# Patient Record
Sex: MALE | Race: WHITE | NOT HISPANIC OR LATINO | Employment: OTHER | URBAN - METROPOLITAN AREA
[De-identification: names, ages, dates, MRNs, and addresses within clinical notes are randomized per-mention and may not be internally consistent; named-entity substitution may affect disease eponyms.]

---

## 2017-01-10 ENCOUNTER — OFFICE VISIT (OUTPATIENT)
Dept: FAMILY MEDICINE CLINIC | Facility: CLINIC | Age: 82
End: 2017-01-10

## 2017-01-10 VITALS
WEIGHT: 217 LBS | BODY MASS INDEX: 30.38 KG/M2 | TEMPERATURE: 97.8 F | SYSTOLIC BLOOD PRESSURE: 136 MMHG | DIASTOLIC BLOOD PRESSURE: 84 MMHG | OXYGEN SATURATION: 98 % | HEIGHT: 71 IN | HEART RATE: 62 BPM

## 2017-01-10 DIAGNOSIS — T14.8XXA BRUISING: ICD-10-CM

## 2017-01-10 DIAGNOSIS — I48.91 ATRIAL FIBRILLATION, UNSPECIFIED TYPE (HCC): Primary | ICD-10-CM

## 2017-01-10 DIAGNOSIS — S09.90XA HEAD INJURY DUE TO TRAUMA, INITIAL ENCOUNTER: ICD-10-CM

## 2017-01-10 DIAGNOSIS — Z91.81 HISTORY OF FALL: ICD-10-CM

## 2017-01-10 PROCEDURE — 99213 OFFICE O/P EST LOW 20 MIN: CPT | Performed by: GENERAL PRACTICE

## 2017-01-10 RX ORDER — AMLODIPINE BESYLATE 10 MG/1
10 TABLET ORAL
COMMUNITY
End: 2021-06-14 | Stop reason: SDUPTHER

## 2017-01-10 NOTE — PROGRESS NOTES
Subjective   Josefina Stephens is a 89 y.o. male.     Chief Complaint   Patient presents with   • Fall     Shoulder hip back pain    • Edema     Hand and leg        History of Present Illness patient is a 89 y.o. are nice  gentleman who now lives in [facility] he has recently come off of a cruise he went on this cruise even though he wasn't feeling that well.  He had slipped at his daughter's house in the kitchen he thought it was on something was slippery because the daughter said she forgot to clean up a mass he had a rather firm fall hitting his buttocks left arm and his head he states the went and sat down felt pretty well after that a few hours he did go on a trip on a cruise to care be an are restates she did not leave the ship for old but for 1.  He states she is had some ever since the fall                [place] ever since that fall        The following portions of the patient's history were reviewed and updated as appropriate: allergies, current medications, past family history, past medical history, past social history, past surgical history and problem list.      Review of Systems   Respiratory:        No marked shortness of breath   Cardiovascular:        To eating red patient sees Dr. Orosco past history of cardiac stent t has some 1+ swelling of the feet especially the left he has what appears to be some swelling of the hands and states he gained 20 pounds while eating on this cruise obviously I think this is probably some edema in addition   Neurological:        Also the fall and he hit his head I quizzed the patient quite a bit on she thinks he's been a little confused or any other symptoms since fall and he says yes I don't think am thinking like a normally of thought asking if he had visual change and he said no         Objective   Physical Exam   Cardiovascular:   The rate is slow On examination the patient appears to be a atrial fibrillation   Pulmonary/Chest:   His lungs are clear          Assessment/Plan   Josefina was seen today for fall and edema.    Diagnoses and all orders for this visit:    Atrial fibrillation, unspecified type    History of fall    Bruising    Head injury due to trauma, initial encounter            Rory Jha MD  1/10/2017    patient is a very nice 89-year-old long-time patient who now lives in [facility] but was in level and came in to see me today because of some of the issues she had this patient has a past history of cardiac stent he is on Plavix he had a fall around [place] he has bruises on his hips arm and a skull bruise that obviously was a pretty hard fall this patient on examination was found to be in atrial fibrillation slow rate 80 but he seemed somewhat confused to me and he admits to being more confused in his life ever since he had the fall I have set this patient to [place] emergency room because Dr. Orosco's at that hospital and I'm Rachaele talked Ana Luisa about this patient but there are also issues could this patient have a subdural because he did hit his head around [place] could he have pitched a quadrant have had a stroke area.  Patient is on his way with his family with the daughter driving to [facility]  The EKG did show the atrial fib that I felt I heard

## 2017-01-16 ENCOUNTER — OFFICE VISIT (OUTPATIENT)
Dept: FAMILY MEDICINE CLINIC | Facility: CLINIC | Age: 82
End: 2017-01-16

## 2017-01-16 VITALS
DIASTOLIC BLOOD PRESSURE: 82 MMHG | HEART RATE: 83 BPM | WEIGHT: 218 LBS | BODY MASS INDEX: 30.52 KG/M2 | SYSTOLIC BLOOD PRESSURE: 118 MMHG | OXYGEN SATURATION: 95 % | HEIGHT: 71 IN

## 2017-01-16 DIAGNOSIS — R60.0 PEDAL EDEMA: Primary | ICD-10-CM

## 2017-01-16 DIAGNOSIS — S80.12XD HEMATOMA OF LOWER EXTREMITY, LEFT, SUBSEQUENT ENCOUNTER: ICD-10-CM

## 2017-01-16 DIAGNOSIS — I48.19 PERSISTENT ATRIAL FIBRILLATION (HCC): ICD-10-CM

## 2017-01-16 DIAGNOSIS — Z79.01 ANTICOAGULATION ADEQUATE WITH ANTICOAGULANT THERAPY: ICD-10-CM

## 2017-01-16 PROCEDURE — 99213 OFFICE O/P EST LOW 20 MIN: CPT | Performed by: GENERAL PRACTICE

## 2017-01-16 RX ORDER — FUROSEMIDE 20 MG/1
20 TABLET ORAL EVERY MORNING
Qty: 60 TABLET | Refills: 2 | Status: SHIPPED | OUTPATIENT
Start: 2017-01-16 | End: 2017-08-01

## 2017-01-16 RX ORDER — RIVAROXABAN 20 MG/1
TABLET, FILM COATED ORAL
Refills: 0 | COMMUNITY
Start: 2017-01-11 | End: 2017-05-01

## 2017-01-16 NOTE — PROGRESS NOTES
Subjective   Josefina Stephens is a 89 y.o. male.     Chief Complaint   Patient presents with   • Leg Pain     left leg    • Leg Swelling     Left leg    • Heart Problem     Discuss results        History of Present Illness patient is a 89 y.o. very nice  gentleman who is here following a visit to the emergency room at [facility] worry was consulted by the house physician there and by Dr. Orosco.  This patient had been found to be in atrial fibrillation for is a first-time issue for this patient and I spoke to Dr. Orosco there is also an issue of a fall question of a subdural contusion to the left buttocks and left thigh swelling of the left lower leg he apparently was cleared for subdural cleared for PE there is a little confusion about a clot in the left femoral artery however he states they said I had no clots but later was told to see his family doctor for an issue with the femoral artery on the left leg.  Supposedly was see receive some information from Dr. Orosco which we have not found at this moment I do have the report from [facility]                        The following portions of the patient's history were reviewed and updated as appropriate: allergies, current medications, past family history, past medical history, past social history, past surgical history and problem list.      Review of Systems   Respiratory:        No PE was found on investigation   Cardiovascular:        Patient remains in atrial fibrillation rate of 83 note to the large weight loss or gain from 1213 to 1/16/16 pounds and I think this is mostly fluid of his edema of the left leg   Skin:        Diagnoses and scan in the left leg   Hematological:        Contusion abrasion obviously bleeding under the skin and muscle of a left thigh and the left lower leg would guess that there is probably a hematoma there and I'm wondering where they talking about a hematoma in his thigh other renal artery I need that  information   Psychiatric/Behavioral:        Subdural ruled out from a fall or hit his head there is some question of some early dementia certainly was doing a good job with a history today         Objective   Physical Exam   Cardiovascular:   Atrial fibrillation 83 bpm patient is on anticoagulation   Pulmonary/Chest: Effort normal and breath sounds normal.   He was ruled out when he was in emergency room [facility]   Neurological:   Subdural ruled out because patient hit his head one of the reasons he was sent to [facility] emergency room for CT   Skin:   Cyst changes of the left lower leg worse on the left some on the right I do not feel a pulse in this dorsalis area could be too much swelling to feel it will need vascular consult till has bruising of the left buttocks left thigh left lower calf         Assessment/Plan   Josefina was seen today for leg pain, leg swelling and heart problem.    Diagnoses and all orders for this visit:    Pedal edema    Hematoma of lower extremity, left, subsequent encounter    Persistent atrial fibrillation    Anticoagulation adequate with anticoagulant therapy              Rory Jha MD  1/16/2017     Patient's states he was sent home from [facility] that night saw Dr. Orosco the next day then on the next morning he began to have some pressure in his chest and he was sent back to the emergency room at [facility] where they ruled out an MI no evidence of elevated enzymes he stated states chest x-ray again normal.  He has had no chest pain sent that morning.  Obviously I need a consult on this patient's right femoral artery which I don't know if there is an issue not had not gotten the results of that study certainly still has some bruising his left buttocks left thigh and leg he has no pulse that I can feel in this left dorsalis the left foot however there may be somewhat swelling and I feel it I will send him to Dr. Solomon who he has seen before vascular specialist I have  put him on Lasix 20 mg told him do not taken tonight started in the morning 16 pound weight gain no fluid in the chest all in the legs the pressure is normal 2 stat is 95% note subdural was ruled out that evening.  He did hit his head in a fall

## 2017-01-30 DIAGNOSIS — I99.8 VASCULAR OCCLUSION: Primary | ICD-10-CM

## 2017-05-01 ENCOUNTER — OFFICE VISIT (OUTPATIENT)
Dept: INTERNAL MEDICINE | Facility: CLINIC | Age: 82
End: 2017-05-01

## 2017-05-01 VITALS
WEIGHT: 202 LBS | BODY MASS INDEX: 28.28 KG/M2 | HEIGHT: 71 IN | DIASTOLIC BLOOD PRESSURE: 70 MMHG | TEMPERATURE: 96.7 F | RESPIRATION RATE: 16 BRPM | SYSTOLIC BLOOD PRESSURE: 130 MMHG | OXYGEN SATURATION: 96 % | HEART RATE: 64 BPM

## 2017-05-01 DIAGNOSIS — M75.42 ROTATOR CUFF IMPINGEMENT SYNDROME OF LEFT SHOULDER: Primary | ICD-10-CM

## 2017-05-01 DIAGNOSIS — D50.0 IRON DEFICIENCY ANEMIA DUE TO CHRONIC BLOOD LOSS: ICD-10-CM

## 2017-05-01 DIAGNOSIS — E78.2 MIXED HYPERLIPIDEMIA: ICD-10-CM

## 2017-05-01 DIAGNOSIS — Z00.00 MEDICARE ANNUAL WELLNESS VISIT, SUBSEQUENT: ICD-10-CM

## 2017-05-01 DIAGNOSIS — I10 ESSENTIAL HYPERTENSION: ICD-10-CM

## 2017-05-01 PROBLEM — E78.5 HYPERLIPIDEMIA: Status: ACTIVE | Noted: 2017-05-01

## 2017-05-01 PROBLEM — K21.9 GERD (GASTROESOPHAGEAL REFLUX DISEASE): Status: ACTIVE | Noted: 2017-05-01

## 2017-05-01 PROCEDURE — 99214 OFFICE O/P EST MOD 30 MIN: CPT | Performed by: FAMILY MEDICINE

## 2017-05-01 PROCEDURE — G0439 PPPS, SUBSEQ VISIT: HCPCS | Performed by: FAMILY MEDICINE

## 2017-05-02 LAB
ALBUMIN SERPL-MCNC: 4.5 G/DL (ref 3.5–5.2)
ALBUMIN/GLOB SERPL: 1.5 G/DL
ALP SERPL-CCNC: 63 U/L (ref 39–117)
ALT SERPL-CCNC: 10 U/L (ref 1–41)
AST SERPL-CCNC: 19 U/L (ref 1–40)
BASOPHILS # BLD AUTO: 0.01 10*3/MM3 (ref 0–0.2)
BASOPHILS NFR BLD AUTO: 0.2 % (ref 0–1.5)
BILIRUB SERPL-MCNC: 0.5 MG/DL (ref 0.1–1.2)
BUN SERPL-MCNC: 22 MG/DL (ref 8–23)
BUN/CREAT SERPL: 17.3 (ref 7–25)
CALCIUM SERPL-MCNC: 9.6 MG/DL (ref 8.2–9.6)
CHLORIDE SERPL-SCNC: 101 MMOL/L (ref 98–107)
CHOLEST SERPL-MCNC: 136 MG/DL (ref 0–200)
CHOLEST/HDLC SERPL: 3.09 {RATIO}
CO2 SERPL-SCNC: 24.9 MMOL/L (ref 22–29)
CREAT SERPL-MCNC: 1.27 MG/DL (ref 0.76–1.27)
EOSINOPHIL # BLD AUTO: 0.08 10*3/MM3 (ref 0–0.7)
EOSINOPHIL NFR BLD AUTO: 1.4 % (ref 0.3–6.2)
ERYTHROCYTE [DISTWIDTH] IN BLOOD BY AUTOMATED COUNT: 15 % (ref 11.5–14.5)
FERRITIN SERPL-MCNC: 26.23 NG/ML (ref 30–400)
GLOBULIN SER CALC-MCNC: 3 GM/DL
GLUCOSE SERPL-MCNC: 105 MG/DL (ref 65–99)
HCT VFR BLD AUTO: 33.4 % (ref 40.4–52.2)
HDLC SERPL-MCNC: 44 MG/DL (ref 40–60)
HGB BLD-MCNC: 10.9 G/DL (ref 13.7–17.6)
IMM GRANULOCYTES # BLD: 0 10*3/MM3 (ref 0–0.03)
IMM GRANULOCYTES NFR BLD: 0 % (ref 0–0.5)
IRON SATN MFR SERPL: 6 % (ref 20–50)
IRON SERPL-MCNC: 30 MCG/DL (ref 59–158)
LDLC SERPL CALC-MCNC: 76 MG/DL (ref 0–100)
LYMPHOCYTES # BLD AUTO: 1.43 10*3/MM3 (ref 0.9–4.8)
LYMPHOCYTES NFR BLD AUTO: 24.4 % (ref 19.6–45.3)
MCH RBC QN AUTO: 29.8 PG (ref 27–32.7)
MCHC RBC AUTO-ENTMCNC: 32.6 G/DL (ref 32.6–36.4)
MCV RBC AUTO: 91.3 FL (ref 79.8–96.2)
MONOCYTES # BLD AUTO: 0.53 10*3/MM3 (ref 0.2–1.2)
MONOCYTES NFR BLD AUTO: 9 % (ref 5–12)
NEUTROPHILS # BLD AUTO: 3.82 10*3/MM3 (ref 1.9–8.1)
NEUTROPHILS NFR BLD AUTO: 65 % (ref 42.7–76)
PLATELET # BLD AUTO: 345 10*3/MM3 (ref 140–500)
POTASSIUM SERPL-SCNC: 3.9 MMOL/L (ref 3.5–5.2)
PROT SERPL-MCNC: 7.5 G/DL (ref 6–8.5)
RBC # BLD AUTO: 3.66 10*6/MM3 (ref 4.6–6)
SODIUM SERPL-SCNC: 141 MMOL/L (ref 136–145)
TIBC SERPL-MCNC: 499 MCG/DL
TRIGL SERPL-MCNC: 79 MG/DL (ref 0–150)
UIBC SERPL-MCNC: 469 MCG/DL
VLDLC SERPL CALC-MCNC: 15.8 MG/DL (ref 5–40)
WBC # BLD AUTO: 5.87 10*3/MM3 (ref 4.5–10.7)

## 2017-05-22 ENCOUNTER — OFFICE VISIT (OUTPATIENT)
Dept: ORTHOPEDIC SURGERY | Facility: CLINIC | Age: 82
End: 2017-05-22

## 2017-05-22 VITALS — HEIGHT: 72 IN | WEIGHT: 202 LBS | TEMPERATURE: 98.1 F | BODY MASS INDEX: 27.36 KG/M2

## 2017-05-22 DIAGNOSIS — M75.122 COMPLETE TEAR OF LEFT ROTATOR CUFF: Primary | ICD-10-CM

## 2017-05-22 PROCEDURE — 20610 DRAIN/INJ JOINT/BURSA W/O US: CPT | Performed by: ORTHOPAEDIC SURGERY

## 2017-05-22 PROCEDURE — 99204 OFFICE O/P NEW MOD 45 MIN: CPT | Performed by: ORTHOPAEDIC SURGERY

## 2017-05-22 RX ADMIN — BUPIVACAINE HYDROCHLORIDE 2 ML: 5 INJECTION, SOLUTION PERINEURAL at 14:41

## 2017-05-22 RX ADMIN — METHYLPREDNISOLONE ACETATE 160 MG: 80 INJECTION, SUSPENSION INTRA-ARTICULAR; INTRALESIONAL; INTRAMUSCULAR; SOFT TISSUE at 14:41

## 2017-05-22 RX ADMIN — LIDOCAINE HYDROCHLORIDE 2 ML: 10 INJECTION, SOLUTION INFILTRATION; PERINEURAL at 14:41

## 2017-05-23 ENCOUNTER — TELEPHONE (OUTPATIENT)
Dept: GASTROENTEROLOGY | Facility: CLINIC | Age: 82
End: 2017-05-23

## 2017-05-23 RX ORDER — METHYLPREDNISOLONE ACETATE 80 MG/ML
160 INJECTION, SUSPENSION INTRA-ARTICULAR; INTRALESIONAL; INTRAMUSCULAR; SOFT TISSUE
Status: COMPLETED | OUTPATIENT
Start: 2017-05-22 | End: 2017-05-22

## 2017-05-23 RX ORDER — LIDOCAINE HYDROCHLORIDE 10 MG/ML
2 INJECTION, SOLUTION INFILTRATION; PERINEURAL
Status: COMPLETED | OUTPATIENT
Start: 2017-05-22 | End: 2017-05-22

## 2017-05-23 RX ORDER — BUPIVACAINE HYDROCHLORIDE 5 MG/ML
2 INJECTION, SOLUTION PERINEURAL
Status: COMPLETED | OUTPATIENT
Start: 2017-05-22 | End: 2017-05-22

## 2017-05-26 ENCOUNTER — TELEPHONE (OUTPATIENT)
Dept: GASTROENTEROLOGY | Facility: CLINIC | Age: 82
End: 2017-05-26

## 2017-05-26 RX ORDER — FERROUS SULFATE 325(65) MG
325 TABLET ORAL
Qty: 30 TABLET | Refills: 5 | Status: SHIPPED | OUTPATIENT
Start: 2017-05-26 | End: 2017-11-01

## 2017-06-07 ENCOUNTER — TELEPHONE (OUTPATIENT)
Dept: GASTROENTEROLOGY | Facility: CLINIC | Age: 82
End: 2017-06-07

## 2017-06-07 NOTE — TELEPHONE ENCOUNTER
Called pt back. Pt states he got a call from [facility] that the ferrous sulfate is not covered by Medicare and he was wondering what this medication was and why he was started on it. Pt states he was not told that this medication would be called in.   Notes Recorded by JACK Lou on 5/26/2017 at 4:03 PM  Please let patient know that his labs demonstrate iron deficiency anemia.  I sent a prescription for ferrous sulfate 325 mg daily to his pharmacy.  Please take with vitamin C. F/u with Estefania as scheduled or me.    Advised that per Margo Dela Cruz: his labs show iron deficiency anemia and that is why she sent the prescription for ferrous sulfate into his pharmacy. Advised that this is an iron supplement. Advised that he should take the iron with his vitamin C. Advised that he should f/u as scheduled on 6/20/17 with Dr Mac. Pt verb understanding.

## 2017-06-07 NOTE — TELEPHONE ENCOUNTER
----- Message from Nataly Juarez sent at 6/7/2017 12:29 PM EDT -----  Regarding: PT CALLED  Contact: [phone]   PT IS CALLING ABOUT MEDICATION ferrous sulfate (FERROUSUL) 325 (65 FE) MG tablet. ANGELINE HALE PER SCRIBE IT TO THE PT. PT IS JUST WONDERING ABOUT THE MEDICATION IF HE NEEDS TO TAKE IT AND WHAT MEDICATION IT IS, DUE TO INSURANCE NOT COVERING THE MED. PT DOESN'T WANT TO GET IT IF HE DOESN'T NEED THE MEDS

## 2017-06-20 ENCOUNTER — OFFICE VISIT (OUTPATIENT)
Dept: GASTROENTEROLOGY | Facility: CLINIC | Age: 82
End: 2017-06-20

## 2017-06-20 VITALS
DIASTOLIC BLOOD PRESSURE: 72 MMHG | TEMPERATURE: 98.2 F | SYSTOLIC BLOOD PRESSURE: 122 MMHG | WEIGHT: 203.4 LBS | BODY MASS INDEX: 28.48 KG/M2 | HEIGHT: 71 IN

## 2017-06-20 DIAGNOSIS — Z87.11 HISTORY OF PEPTIC ULCER DISEASE: ICD-10-CM

## 2017-06-20 DIAGNOSIS — K21.9 GASTROESOPHAGEAL REFLUX DISEASE, ESOPHAGITIS PRESENCE NOT SPECIFIED: Primary | ICD-10-CM

## 2017-06-20 DIAGNOSIS — R12 HEARTBURN: ICD-10-CM

## 2017-06-20 DIAGNOSIS — D64.9 ANEMIA, UNSPECIFIED TYPE: ICD-10-CM

## 2017-06-20 PROCEDURE — 99213 OFFICE O/P EST LOW 20 MIN: CPT | Performed by: INTERNAL MEDICINE

## 2017-06-20 RX ORDER — SODIUM CHLORIDE 0.9 % (FLUSH) 0.9 %
1-10 SYRINGE (ML) INJECTION AS NEEDED
Status: CANCELLED | OUTPATIENT
Start: 2017-06-20

## 2017-06-20 NOTE — PROGRESS NOTES
Chief Complaint   Patient presents with   • GI Bleeding       Josefina Stephens is a  89 y.o. male here for a follow up visit for iron  Deficiency anemia, dark stools, history of peptic ulcer disease (2008), his last colonoscopy was 15 years ago    HPI Comments: EGD 2008, bleeding peptic ulcer treated and admitted to the hospital  Colonoscopy canceled on that day, no follow-up colonoscopy was ever performed    GI Bleeding   This is a recurrent problem. The current episode started more than 1 month ago. The problem occurs every several days. The problem has been waxing and waning. Associated symptoms include a change in bowel habit. Pertinent negatives include no abdominal pain, anorexia, congestion, coughing, fatigue, fever, headaches, myalgias, nausea, rash, sore throat, swollen glands, vertigo, visual change or vomiting. Nothing aggravates the symptoms. Treatments tried: He has stopped Xarelto, and started iron. The treatment provided mild relief.       Past Medical History:   Diagnosis Date   • Duodenal ulcer     HX   • GERD (gastroesophageal reflux disease)    • GI (gastrointestinal bleed)     HX   • Hyperlipidemia    • Hypertension    • Prostate cancer        Past Surgical History:   Procedure Laterality Date   • COLONOSCOPY  1955   • CORONARY ANGIOPLASTY WITH STENT PLACEMENT     • HERNIA REPAIR     • UPPER GASTROINTESTINAL ENDOSCOPY  11/12/2008    GI bleed, peptic ulcer disease, melena, treated w/heater probe       Scheduled Meds:    Continuous Infusions:  No current facility-administered medications for this visit.     PRN Meds:.    Allergies   Allergen Reactions   • Lotrel [Amlodipine Besy-Benazepril Hcl] Swelling   • Aliskiren    • Colesevelam Diarrhea   • Contrast Dye    • Lipitor [Atorvastatin]    • Penicillins    • Ticlopidine Rash       Social History     Social History   • Marital status:      Spouse name: N/A   • Number of children: N/A   • Years of education: N/A     Occupational History   • Not  on file.     Social History Main Topics   • Smoking status: Never Smoker   • Smokeless tobacco: Not on file   • Alcohol use No   • Drug use: No   • Sexual activity: Not on file     Other Topics Concern   • Not on file     Social History Narrative       History reviewed. No pertinent family history.    Review of Systems   Constitutional: Negative for fatigue and fever.   HENT: Negative for congestion and sore throat.    Respiratory: Negative for cough.    Gastrointestinal: Positive for change in bowel habit. Negative for abdominal pain, anorexia, nausea and vomiting.   Musculoskeletal: Negative for myalgias.   Skin: Negative for rash.   Neurological: Negative for vertigo and headaches.   All other systems reviewed and are negative.      Vitals:    06/20/17 1107   BP: 122/72   Temp: 98.2 °F (36.8 °C)       Physical Exam   Constitutional: He is oriented to person, place, and time. He appears well-developed and well-nourished.   HENT:   Head: Normocephalic and atraumatic.   Eyes: Conjunctivae and EOM are normal.   Neck: Normal range of motion. No tracheal deviation present.   Cardiovascular: Normal rate and regular rhythm.    Pulmonary/Chest: Effort normal and breath sounds normal. No respiratory distress.   Abdominal: Soft. Bowel sounds are normal. He exhibits no distension and no mass. There is no tenderness. There is no rebound and no guarding.   Musculoskeletal: Normal range of motion.   Neurological: He is alert and oriented to person, place, and time.   Skin: Skin is warm and dry.   Psychiatric: He has a normal mood and affect. Judgment normal.   Nursing note and vitals reviewed.      No images are attached to the encounter.    Problem list     Ulcer disease  Melena  Iron deficiency anemia  He has stopped anticoagulation      Assessment/Plan    Schedule an EGD and colonoscopy in the near future.  hold  Iron sulfate 1 week before procedure.  Continue to hold Xarelto      An EGD and  colonoscopy will be scheduled by  my staff, the instructions will either be handed to you or mailed to you.  You'll receive an appointment date and time.  You will need to bring a  with you on that day to drive you home.

## 2017-07-06 ENCOUNTER — ANESTHESIA (OUTPATIENT)
Dept: GASTROENTEROLOGY | Facility: HOSPITAL | Age: 82
End: 2017-07-06

## 2017-07-06 ENCOUNTER — ANESTHESIA EVENT (OUTPATIENT)
Dept: GASTROENTEROLOGY | Facility: HOSPITAL | Age: 82
End: 2017-07-06

## 2017-07-06 ENCOUNTER — HOSPITAL ENCOUNTER (OUTPATIENT)
Facility: HOSPITAL | Age: 82
Setting detail: HOSPITAL OUTPATIENT SURGERY
Discharge: HOME | End: 2017-07-06
Attending: INTERNAL MEDICINE | Admitting: INTERNAL MEDICINE

## 2017-07-06 VITALS
RESPIRATION RATE: 14 BRPM | HEIGHT: 71 IN | OXYGEN SATURATION: 95 % | TEMPERATURE: 97.7 F | HEART RATE: 60 BPM | DIASTOLIC BLOOD PRESSURE: 70 MMHG | SYSTOLIC BLOOD PRESSURE: 117 MMHG | WEIGHT: 192.4 LBS | BODY MASS INDEX: 26.94 KG/M2

## 2017-07-06 DIAGNOSIS — I48.91 ATRIAL FIBRILLATION, UNSPECIFIED TYPE (HCC): ICD-10-CM

## 2017-07-06 DIAGNOSIS — Z87.11 HISTORY OF PEPTIC ULCER DISEASE: ICD-10-CM

## 2017-07-06 DIAGNOSIS — K92.1 BLACK STOOL: ICD-10-CM

## 2017-07-06 DIAGNOSIS — D64.9 ANEMIA, UNSPECIFIED TYPE: ICD-10-CM

## 2017-07-06 DIAGNOSIS — R12 HEARTBURN: ICD-10-CM

## 2017-07-06 DIAGNOSIS — K21.9 GASTROESOPHAGEAL REFLUX DISEASE, ESOPHAGITIS PRESENCE NOT SPECIFIED: ICD-10-CM

## 2017-07-06 PROCEDURE — 45378 DIAGNOSTIC COLONOSCOPY: CPT | Performed by: INTERNAL MEDICINE

## 2017-07-06 PROCEDURE — 25010000002 PROPOFOL 10 MG/ML EMULSION: Performed by: NURSE ANESTHETIST, CERTIFIED REGISTERED

## 2017-07-06 PROCEDURE — 88305 TISSUE EXAM BY PATHOLOGIST: CPT | Performed by: INTERNAL MEDICINE

## 2017-07-06 PROCEDURE — 88312 SPECIAL STAINS GROUP 1: CPT | Performed by: INTERNAL MEDICINE

## 2017-07-06 PROCEDURE — 43239 EGD BIOPSY SINGLE/MULTIPLE: CPT | Performed by: INTERNAL MEDICINE

## 2017-07-06 RX ORDER — SODIUM CHLORIDE 0.9 % (FLUSH) 0.9 %
1-10 SYRINGE (ML) INJECTION AS NEEDED
Status: DISCONTINUED | OUTPATIENT
Start: 2017-07-06 | End: 2017-07-06 | Stop reason: HOSPADM

## 2017-07-06 RX ORDER — LIDOCAINE HYDROCHLORIDE 20 MG/ML
INJECTION, SOLUTION INFILTRATION; PERINEURAL AS NEEDED
Status: DISCONTINUED | OUTPATIENT
Start: 2017-07-06 | End: 2017-07-06 | Stop reason: SURG

## 2017-07-06 RX ORDER — PROPOFOL 10 MG/ML
VIAL (ML) INTRAVENOUS CONTINUOUS PRN
Status: DISCONTINUED | OUTPATIENT
Start: 2017-07-06 | End: 2017-07-06 | Stop reason: SURG

## 2017-07-06 RX ORDER — PROPOFOL 10 MG/ML
VIAL (ML) INTRAVENOUS AS NEEDED
Status: DISCONTINUED | OUTPATIENT
Start: 2017-07-06 | End: 2017-07-06 | Stop reason: SURG

## 2017-07-06 RX ORDER — SODIUM CHLORIDE, SODIUM LACTATE, POTASSIUM CHLORIDE, CALCIUM CHLORIDE 600; 310; 30; 20 MG/100ML; MG/100ML; MG/100ML; MG/100ML
1000 INJECTION, SOLUTION INTRAVENOUS CONTINUOUS PRN
Status: DISCONTINUED | OUTPATIENT
Start: 2017-07-06 | End: 2017-07-06 | Stop reason: HOSPADM

## 2017-07-06 RX ORDER — ONDANSETRON 2 MG/ML
4 INJECTION INTRAMUSCULAR; INTRAVENOUS ONCE AS NEEDED
Status: DISCONTINUED | OUTPATIENT
Start: 2017-07-06 | End: 2017-07-06 | Stop reason: HOSPADM

## 2017-07-06 RX ADMIN — PROPOFOL 50 MG: 10 INJECTION, EMULSION INTRAVENOUS at 12:04

## 2017-07-06 RX ADMIN — LIDOCAINE HYDROCHLORIDE 60 MG: 20 INJECTION, SOLUTION INFILTRATION; PERINEURAL at 11:59

## 2017-07-06 RX ADMIN — PROPOFOL 50 MG: 10 INJECTION, EMULSION INTRAVENOUS at 12:01

## 2017-07-06 RX ADMIN — LIDOCAINE HYDROCHLORIDE 60 MG: 20 INJECTION, SOLUTION INFILTRATION; PERINEURAL at 12:01

## 2017-07-06 RX ADMIN — PROPOFOL 200 MCG/KG/MIN: 10 INJECTION, EMULSION INTRAVENOUS at 12:01

## 2017-07-06 RX ADMIN — SODIUM CHLORIDE, POTASSIUM CHLORIDE, SODIUM LACTATE AND CALCIUM CHLORIDE 1000 ML: 600; 310; 30; 20 INJECTION, SOLUTION INTRAVENOUS at 11:26

## 2017-07-06 NOTE — ANESTHESIA PREPROCEDURE EVALUATION
Anesthesia Evaluation     Patient summary reviewed and Nursing notes reviewed   NPO Solid Status: > 8 hours  NPO Liquid Status: > 2 hours     Airway   Mallampati: II  TM distance: <3 FB  Neck ROM: limited  possible difficult intubation  Dental - normal exam     Pulmonary - normal exam   Cardiovascular - normal exam    (+) hypertension well controlled, dysrhythmias Atrial Fib,       Neuro/Psych  GI/Hepatic/Renal/Endo    (+)  GERD, PUD,     Musculoskeletal     Abdominal  - normal exam    Bowel sounds: normal.   Substance History      OB/GYN          Other      history of cancer (prostate)                                  Anesthesia Plan    ASA 3     MAC     Anesthetic plan and risks discussed with patient.

## 2017-07-06 NOTE — ANESTHESIA POSTPROCEDURE EVALUATION
Patient: Josefina Stephens    Procedure Summary     Date Anesthesia Start Anesthesia Stop Room / Location    07/06/17 1159 1239  [facility] /  [facility]       Procedure Diagnosis Surgeon Provider    ESOPHAGOGASTRODUODENOSCOPY with biopsies (N/A Esophagus); COLONOSCOPY to cecum  (N/A ) Black stool; History of peptic ulcer disease; Atrial fibrillation, unspecified type  (Black stool [K92.1]; History of peptic ulcer disease [Z87.11]; Atrial fibrillation, unspecified type [I48.91]) MD Angie Myles MD          Anesthesia Type: MAC  Last vitals  /84 (07/06/17 1253)    Temp      Pulse 67 (07/06/17 1253)   Resp 15 (07/06/17 1253)    SpO2 95 % (07/06/17 1253)      Post Anesthesia Care and Evaluation    Patient location during evaluation: PACU  Patient participation: complete - patient participated  Level of consciousness: awake  Pain score: 0  Pain management: adequate  Airway patency: patent  Anesthetic complications: No anesthetic complications    Cardiovascular status: acceptable  Respiratory status: acceptable  Hydration status: acceptable

## 2017-07-06 NOTE — PLAN OF CARE
Problem: Patient Care Overview (Adult)  Goal: Plan of Care Review  Outcome: Ongoing (interventions implemented as appropriate)    07/06/17 1109   Coping/Psychosocial Response Interventions   Plan Of Care Reviewed With patient   Patient Care Overview   Progress progress toward functional goals as expected       Goal: Adult Individualization and Mutuality  Outcome: Ongoing (interventions implemented as appropriate)    07/06/17 1109   Individualization   Patient Specific Preferences Josefina       Goal: Discharge Needs Assessment  Outcome: Ongoing (interventions implemented as appropriate)    07/06/17 1109   Discharge Needs Assessment   Concerns To Be Addressed denies needs/concerns at this time   Discharge Disposition home or self-care   Living Environment   Transportation Available car         Problem: GI Endoscopy (Adult)  Goal: Signs and Symptoms of Listed Potential Problems Will be Absent or Manageable (GI Endoscopy)  Outcome: Ongoing (interventions implemented as appropriate)    07/06/17 1109   GI Endoscopy   Problems Assessed (GI Endoscopy) all   Problems Present (GI Endoscopy) bleeding

## 2017-07-07 LAB
CYTO UR: NORMAL
LAB AP CASE REPORT: NORMAL
Lab: NORMAL
PATH REPORT.FINAL DX SPEC: NORMAL
PATH REPORT.GROSS SPEC: NORMAL

## 2017-07-10 ENCOUNTER — TELEPHONE (OUTPATIENT)
Dept: GASTROENTEROLOGY | Facility: CLINIC | Age: 82
End: 2017-07-10

## 2017-07-10 NOTE — TELEPHONE ENCOUNTER
Pt returned call per a staff message.     Called pt back. Advised that per Dr Mac: the pathology came back negative and MD recommends to complete hemoccult cards that I have mailed to his home. Advised these will look for blood in his stool. Advised that MD also recommends to f/u in the office in 8 weeks. Pt verb understanding. Appt made for 9/11/17 at 1:30 PM.

## 2017-07-10 NOTE — TELEPHONE ENCOUNTER
----- Message from Faisal Mac MD sent at 7/9/2017  9:49 AM EDT -----  Pathology is negative.  Please send him Hemoccults to his home  Office visit 8 weeks

## 2017-08-01 ENCOUNTER — OFFICE VISIT (OUTPATIENT)
Dept: INTERNAL MEDICINE | Facility: CLINIC | Age: 82
End: 2017-08-01

## 2017-08-01 ENCOUNTER — TELEPHONE (OUTPATIENT)
Dept: GASTROENTEROLOGY | Facility: CLINIC | Age: 82
End: 2017-08-01

## 2017-08-01 VITALS
DIASTOLIC BLOOD PRESSURE: 88 MMHG | BODY MASS INDEX: 28.73 KG/M2 | HEART RATE: 79 BPM | OXYGEN SATURATION: 98 % | TEMPERATURE: 97.2 F | SYSTOLIC BLOOD PRESSURE: 156 MMHG | WEIGHT: 206 LBS

## 2017-08-01 DIAGNOSIS — M54.31 SCIATICA OF RIGHT SIDE: Primary | ICD-10-CM

## 2017-08-01 PROCEDURE — 99213 OFFICE O/P EST LOW 20 MIN: CPT | Performed by: NURSE PRACTITIONER

## 2017-08-01 RX ORDER — CILOSTAZOL 100 MG/1
100 TABLET ORAL 2 TIMES DAILY
Status: ON HOLD | COMMUNITY
End: 2021-04-22 | Stop reason: ALTCHOICE

## 2017-08-01 RX ORDER — NAPROXEN SODIUM 220 MG
220 TABLET ORAL 2 TIMES DAILY PRN
COMMUNITY
End: 2017-11-01 | Stop reason: SINTOL

## 2017-08-01 NOTE — TELEPHONE ENCOUNTER
Patient called advised of Dr. Mac's note. He verb understanding. Message sent to k for referral to [facility].

## 2017-08-01 NOTE — PROGRESS NOTES
Vitals:    08/01/17 1016   BP: 156/88   Pulse: 79   Temp: 97.2 °F (36.2 °C)   SpO2: 98%     Last 2 weights    08/01/17  1016   Weight: 206 lb (93.4 kg)     Social History   Substance Use Topics   • Smoking status: Never Smoker   • Smokeless tobacco: Not on file   • Alcohol use No       Subjective     HPI  Pt presents to office today with new problem of right hip pain that radiates down right leg. Pt states he has been having issues for several year and pain waxes and wanes however this particular time the pain has increased. This pain started this past Friday as he went on a 6 hour car ride. Describes pain and shooting and going down right leg, rating it at 8/10; worse when standing/walking. Better with sitting and resolves when lying down. He has also tried a heating pad with little relief.     The following portions of the patient's history were reviewed and updated as appropriate: allergies, current medications, past medical history, past social history and problem list.    Review of Systems   Constitutional: Negative.    Respiratory: Negative.    Cardiovascular: Negative.    Musculoskeletal:        Right hip and leg pain       Objective     Physical Exam   Constitutional: He is oriented to person, place, and time. Vital signs are normal. He appears well-developed and well-nourished.   HENT:   Head: Normocephalic and atraumatic.   Neck: Normal range of motion.   Cardiovascular: Normal rate, regular rhythm and normal heart sounds.    Pulmonary/Chest: Effort normal and breath sounds normal.   Musculoskeletal: Normal range of motion.        Legs:  Straight leg test neg. Able to reproduce pain during palpation. No rash apparent   Neurological: He is oriented to person, place, and time.   Nursing note and vitals reviewed.      Assessment/Plan   Josefina was seen today for back pain, hip pain and leg pain.    Diagnoses and all orders for this visit:    Sciatica of right side    Other orders  -     diclofenac (VOLTAREN) 50  MG EC tablet; Take 1 tablet by mouth 2 (Two) Times a Day As Needed (pain).           -likely sciatic pain. Pt states he use to take voltaren in the past with relief. There is another medication that helped more but he cannot recall it. Discussed with pt my reservation of narcotics and would like to hold off at this time.  -discussed stretches that can be performed to help decreased pain  -heating pad  -cont home meds  -poss PT referral if pain cont  -FU prn or if symptoms persist/worsen

## 2017-08-01 NOTE — TELEPHONE ENCOUNTER
----- Message from Faisal Mac MD sent at 7/31/2017  4:49 PM EDT -----  Regarding: hemoccults  Please call patient with him know that Hemoccults are negative.  Please refer him on to [facility] any doctor for anemia negative EGD and colonoscopy, heme negative stools    Have him Follow-up with his cardiologist Dr. Orosco 2 weeks after he sees the hematologist to possibly start back anticoagulation

## 2017-11-01 ENCOUNTER — OFFICE VISIT (OUTPATIENT)
Dept: INTERNAL MEDICINE | Facility: CLINIC | Age: 82
End: 2017-11-01

## 2017-11-01 VITALS
WEIGHT: 197 LBS | SYSTOLIC BLOOD PRESSURE: 162 MMHG | OXYGEN SATURATION: 97 % | BODY MASS INDEX: 27.48 KG/M2 | DIASTOLIC BLOOD PRESSURE: 88 MMHG | HEART RATE: 82 BPM | TEMPERATURE: 96.9 F

## 2017-11-01 DIAGNOSIS — M54.31 SCIATICA OF RIGHT SIDE: ICD-10-CM

## 2017-11-01 DIAGNOSIS — K21.9 GASTROESOPHAGEAL REFLUX DISEASE, ESOPHAGITIS PRESENCE NOT SPECIFIED: ICD-10-CM

## 2017-11-01 DIAGNOSIS — I10 ESSENTIAL HYPERTENSION: Primary | ICD-10-CM

## 2017-11-01 DIAGNOSIS — I48.0 PAROXYSMAL ATRIAL FIBRILLATION (HCC): ICD-10-CM

## 2017-11-01 DIAGNOSIS — D50.0 IRON DEFICIENCY ANEMIA DUE TO CHRONIC BLOOD LOSS: ICD-10-CM

## 2017-11-01 DIAGNOSIS — E78.2 MIXED HYPERLIPIDEMIA: ICD-10-CM

## 2017-11-01 PROBLEM — I48.91 ATRIAL FIBRILLATION: Status: ACTIVE | Noted: 2017-11-01

## 2017-11-01 PROCEDURE — 99214 OFFICE O/P EST MOD 30 MIN: CPT | Performed by: FAMILY MEDICINE

## 2017-11-01 RX ORDER — RIVAROXABAN 20 MG/1
TABLET, FILM COATED ORAL
COMMUNITY
Start: 2017-10-13 | End: 2018-05-03

## 2017-11-01 NOTE — PROGRESS NOTES
Subjective   Josefina Stephens is a 89 y.o. male.     Chief Complaint   Patient presents with   • Hypertension   • Hyperlipidemia   • Anemia         History of Present Illness   Mr. Stephens returns.  He lives in [facility] now.  He has most his physicians in the [place] area.  His blood pressure medicines are reviewed as his his appointment with Dr. Orosco's cardiologist.  He is now on Xarelto and off aspirin.  He has a sees gastroenterology and bowel accounts his stomach bleeding and black stool have ceased.    He was seen here for sciatica recently and that is improved.  He is conscious some physical therapy he has gone through some physical therapy for his left shoulder in [facility].    For the sciatica of the right leg I reviewed some simple exercises which can be done at home.    The following portions of the patient's history were reviewed and updated as appropriate: allergies, current medications, past social history and problem list.    Review of Systems   Constitutional: Negative.    HENT: Negative.    Eyes: Negative.    Respiratory: Negative.    Cardiovascular: Negative.    Gastrointestinal: Negative.    Endocrine: Negative.    Genitourinary: Negative.    Musculoskeletal: Negative.    Skin: Negative.    Allergic/Immunologic: Negative.    Neurological: Negative.    Hematological: Negative.    Psychiatric/Behavioral: Negative.        Objective   Vitals:    11/01/17 1150   BP: 162/88   Pulse: 82   Temp: 96.9 °F (36.1 °C)   SpO2: 97%     Physical Exam   Constitutional: He is oriented to person, place, and time. He appears well-developed and well-nourished.   HENT:   Head: Normocephalic and atraumatic.   Right Ear: Tympanic membrane and external ear normal.   Left Ear: Tympanic membrane and external ear normal.   Nose: Nose normal.   Mouth/Throat: Oropharynx is clear and moist.   Eyes: Conjunctivae and EOM are normal. Pupils are equal, round, and reactive to light.   Neck: Normal range of  motion. Neck supple. No JVD present. No thyromegaly present.   Cardiovascular: Normal rate, regular rhythm, normal heart sounds and intact distal pulses.    Pulmonary/Chest: Effort normal and breath sounds normal.   Abdominal: Soft. Bowel sounds are normal.   Musculoskeletal:        Right hip: He exhibits decreased range of motion.   Lymphadenopathy:     He has no cervical adenopathy.   Neurological: He is alert and oriented to person, place, and time. No cranial nerve deficit. Coordination normal.   Skin: Skin is warm and dry. No rash noted.   Psychiatric: He has a normal mood and affect. His behavior is normal. Judgment and thought content normal.   Vitals reviewed.      Assessment/Plan   Problem List Items Addressed This Visit        Cardiovascular and Mediastinum    Atrial fibrillation    Hypertension - Primary    Relevant Orders    CBC & Differential    Comprehensive Metabolic Panel    Lipid Panel With / Chol / HDL Ratio    Iron and TIBC    Ferritin    Vitamin B12    Folate    Hyperlipidemia    Relevant Orders    CBC & Differential    Comprehensive Metabolic Panel    Lipid Panel With / Chol / HDL Ratio    Iron and TIBC    Ferritin    Vitamin B12    Folate       Digestive    GERD (gastroesophageal reflux disease)    Relevant Orders    CBC & Differential    Comprehensive Metabolic Panel    Lipid Panel With / Chol / HDL Ratio    Iron and TIBC    Ferritin    Vitamin B12    Folate      Other Visit Diagnoses     Iron deficiency anemia due to chronic blood loss        Relevant Orders    CBC & Differential    Comprehensive Metabolic Panel    Lipid Panel With / Chol / HDL Ratio    Iron and TIBC    Ferritin    Vitamin B12    Folate    Sciatica of right side        Relevant Orders    CBC & Differential    Comprehensive Metabolic Panel    Lipid Panel With / Chol / HDL Ratio    Iron and TIBC    Ferritin    Vitamin B12    Folate      Plan: Meds remain the same he is to get labs today with follow-up in about 6 months and  follow-up with cardiology and also gastroenterology.

## 2017-11-02 LAB
ALBUMIN SERPL-MCNC: 4.7 G/DL (ref 3.5–5.2)
ALBUMIN/GLOB SERPL: 1.4 G/DL
ALP SERPL-CCNC: 60 U/L (ref 39–117)
ALT SERPL-CCNC: 23 U/L (ref 1–41)
AST SERPL-CCNC: 22 U/L (ref 1–40)
BASOPHILS # BLD AUTO: 0.03 10*3/MM3 (ref 0–0.2)
BASOPHILS NFR BLD AUTO: 0.6 % (ref 0–1.5)
BILIRUB SERPL-MCNC: 0.6 MG/DL (ref 0.1–1.2)
BUN SERPL-MCNC: 17 MG/DL (ref 8–23)
BUN/CREAT SERPL: 16.3 (ref 7–25)
CALCIUM SERPL-MCNC: 9.5 MG/DL (ref 8.2–9.6)
CHLORIDE SERPL-SCNC: 101 MMOL/L (ref 98–107)
CHOLEST SERPL-MCNC: 158 MG/DL (ref 0–200)
CHOLEST/HDLC SERPL: 3.29 {RATIO}
CO2 SERPL-SCNC: 27.3 MMOL/L (ref 22–29)
CREAT SERPL-MCNC: 1.04 MG/DL (ref 0.76–1.27)
EOSINOPHIL # BLD AUTO: 0.1 10*3/MM3 (ref 0–0.7)
EOSINOPHIL NFR BLD AUTO: 1.9 % (ref 0.3–6.2)
ERYTHROCYTE [DISTWIDTH] IN BLOOD BY AUTOMATED COUNT: 18.1 % (ref 11.5–14.5)
FERRITIN SERPL-MCNC: 31.15 NG/ML (ref 30–400)
FOLATE SERPL-MCNC: >20 NG/ML (ref 4.78–24.2)
GFR SERPLBLD CREATININE-BSD FMLA CKD-EPI: 67 ML/MIN/1.73
GFR SERPLBLD CREATININE-BSD FMLA CKD-EPI: 81 ML/MIN/1.73
GLOBULIN SER CALC-MCNC: 3.3 GM/DL
GLUCOSE SERPL-MCNC: 104 MG/DL (ref 65–99)
HCT VFR BLD AUTO: 40.7 % (ref 40.4–52.2)
HDLC SERPL-MCNC: 48 MG/DL (ref 40–60)
HGB BLD-MCNC: 13.1 G/DL (ref 13.7–17.6)
IMM GRANULOCYTES # BLD: 0 10*3/MM3 (ref 0–0.03)
IMM GRANULOCYTES NFR BLD: 0 % (ref 0–0.5)
IRON SATN MFR SERPL: 15 % (ref 20–50)
IRON SERPL-MCNC: 73 MCG/DL (ref 59–158)
LDLC SERPL CALC-MCNC: 88 MG/DL (ref 0–100)
LYMPHOCYTES # BLD AUTO: 1.41 10*3/MM3 (ref 0.9–4.8)
LYMPHOCYTES NFR BLD AUTO: 26.3 % (ref 19.6–45.3)
MCH RBC QN AUTO: 29.6 PG (ref 27–32.7)
MCHC RBC AUTO-ENTMCNC: 32.2 G/DL (ref 32.6–36.4)
MCV RBC AUTO: 92.1 FL (ref 79.8–96.2)
MONOCYTES # BLD AUTO: 0.47 10*3/MM3 (ref 0.2–1.2)
MONOCYTES NFR BLD AUTO: 8.8 % (ref 5–12)
NEUTROPHILS # BLD AUTO: 3.35 10*3/MM3 (ref 1.9–8.1)
NEUTROPHILS NFR BLD AUTO: 62.4 % (ref 42.7–76)
PLATELET # BLD AUTO: 272 10*3/MM3 (ref 140–500)
POTASSIUM SERPL-SCNC: 3.9 MMOL/L (ref 3.5–5.2)
PROT SERPL-MCNC: 8 G/DL (ref 6–8.5)
RBC # BLD AUTO: 4.42 10*6/MM3 (ref 4.6–6)
SODIUM SERPL-SCNC: 143 MMOL/L (ref 136–145)
TIBC SERPL-MCNC: 486 MCG/DL
TRIGL SERPL-MCNC: 111 MG/DL (ref 0–150)
UIBC SERPL-MCNC: 413 MCG/DL
VIT B12 SERPL-MCNC: 675 PG/ML (ref 211–946)
VLDLC SERPL CALC-MCNC: 22.2 MG/DL (ref 5–40)
WBC # BLD AUTO: 5.36 10*3/MM3 (ref 4.5–10.7)

## 2017-11-08 ENCOUNTER — TELEPHONE (OUTPATIENT)
Dept: INTERNAL MEDICINE | Facility: CLINIC | Age: 82
End: 2017-11-08

## 2018-05-03 ENCOUNTER — OFFICE VISIT (OUTPATIENT)
Dept: INTERNAL MEDICINE | Facility: CLINIC | Age: 83
End: 2018-05-03

## 2018-05-03 VITALS
HEART RATE: 88 BPM | WEIGHT: 193 LBS | SYSTOLIC BLOOD PRESSURE: 138 MMHG | DIASTOLIC BLOOD PRESSURE: 86 MMHG | TEMPERATURE: 97.8 F | OXYGEN SATURATION: 95 % | BODY MASS INDEX: 26.92 KG/M2

## 2018-05-03 DIAGNOSIS — Z00.00 MEDICARE ANNUAL WELLNESS VISIT, SUBSEQUENT: ICD-10-CM

## 2018-05-03 DIAGNOSIS — I10 ESSENTIAL HYPERTENSION: Primary | ICD-10-CM

## 2018-05-03 DIAGNOSIS — I48.0 PAROXYSMAL ATRIAL FIBRILLATION (HCC): ICD-10-CM

## 2018-05-03 DIAGNOSIS — K21.9 GASTROESOPHAGEAL REFLUX DISEASE, ESOPHAGITIS PRESENCE NOT SPECIFIED: ICD-10-CM

## 2018-05-03 DIAGNOSIS — D50.8 OTHER IRON DEFICIENCY ANEMIA: ICD-10-CM

## 2018-05-03 DIAGNOSIS — E78.2 MIXED HYPERLIPIDEMIA: ICD-10-CM

## 2018-05-03 PROCEDURE — 99214 OFFICE O/P EST MOD 30 MIN: CPT | Performed by: FAMILY MEDICINE

## 2018-05-03 PROCEDURE — G0439 PPPS, SUBSEQ VISIT: HCPCS | Performed by: FAMILY MEDICINE

## 2018-05-03 RX ORDER — FINASTERIDE 5 MG/1
TABLET, FILM COATED ORAL
COMMUNITY
Start: 2018-03-30 | End: 2020-11-04

## 2018-05-03 NOTE — PROGRESS NOTES
QUICK REFERENCE INFORMATION:  The ABCs of the Annual Wellness Visit    Subsequent Medicare Wellness Visit    HEALTH RISK ASSESSMENT    12/17/1926    Recent Hospitalizations:  No hospitalization(s) within the last year..        Current Medical Providers:  Patient Care Team:  Robson Ritter Jr., MD as PCP - General (Family Medicine)  JACK Buckley as PCP - Claims Attributed  Rashel Orosco MD as Consulting Physician (Cardiology)  Faisal Mac MD as Consulting Physician (Gastroenterology)        Smoking Status:  History   Smoking Status   • Never Smoker   Smokeless Tobacco   • Not on file       Alcohol Consumption:  History   Alcohol Use No       Depression Screen:   PHQ-2/PHQ-9 Depression Screening 5/3/2018   Little interest or pleasure in doing things 0   Feeling down, depressed, or hopeless 0   Trouble falling or staying asleep, or sleeping too much -   Feeling tired or having little energy -   Poor appetite or overeating -   Feeling bad about yourself - or that you are a failure or have let yourself or your family down -   Trouble concentrating on things, such as reading the newspaper or watching television -   Moving or speaking so slowly that other people could have noticed. Or the opposite - being so fidgety or restless that you have been moving around a lot more than usual -   Thoughts that you would be better off dead, or of hurting yourself in some way -   Total Score 0   If you checked off any problems, how difficult have these problems made it for you to do your work, take care of things at home, or get along with other people? -       Health Habits and Functional and Cognitive Screening:  Functional & Cognitive Status 5/3/2018   Do you have difficulty preparing food and eating? No   Do you have difficulty bathing yourself, getting dressed or grooming yourself? No   Do you have difficulty using the toilet? No   Do you have difficulty moving around from place to place? No   Do you have trouble with  steps or getting out of a bed or a chair? Yes   In the past year have you fallen or experienced a near fall? Yes   Current Diet Well Balanced Diet   Dental Exam Up to date   Eye Exam Up to date   Exercise (times per week) 0 times per week   Current Exercise Activities Include None   Do you need help using the phone?  No   Are you deaf or do you have serious difficulty hearing?  Yes   Do you need help with transportation? No   Do you need help shopping? No   Do you need help preparing meals?  No   Do you need help with housework?  No   Do you need help with laundry? No   Do you need help taking your medications? No   Do you need help managing money? No   Do you ever drive or ride in a car without wearing a seat belt? No   Do you have difficulty concentrating, remembering or making decisions? -           Does the patient have evidence of cognitive impairment? No    Aspirin use counseling: Taking ASA appropriately as indicated      Recent Lab Results:  CMP:  Lab Results   Component Value Date     (H) 11/01/2017    BUN 17 11/01/2017    CREATININE 1.04 11/01/2017    EGFRIFNONA 67 11/01/2017    EGFRIFAFRI 81 11/01/2017    BCR 16.3 11/01/2017     11/01/2017    K 3.9 11/01/2017    CO2 27.3 11/01/2017    CALCIUM 9.5 11/01/2017    PROTENTOTREF 8.0 11/01/2017    ALBUMIN 4.70 11/01/2017    LABGLOBREF 3.3 11/01/2017    LABIL2 1.4 11/01/2017    BILITOT 0.6 11/01/2017    ALKPHOS 60 11/01/2017    AST 22 11/01/2017    ALT 23 11/01/2017     Lipid Panel:  Lab Results   Component Value Date    TRIG 111 11/01/2017    HDL 48 11/01/2017    VLDL 22.2 11/01/2017     HbA1c:       Visual Acuity:  No exam data present    Age-appropriate Screening Schedule:  Refer to the list below for future screening recommendations based on patient's age, sex and/or medical conditions. Orders for these recommended tests are listed in the plan section. The patient has been provided with a written plan.    Health Maintenance   Topic Date Due   •  TDAP/TD VACCINES (1 - Tdap) 12/17/1945   • ZOSTER VACCINE  01/10/2017   • PNEUMOCOCCAL VACCINES (65+ LOW/MEDIUM RISK) (2 of 2 - PPSV23) 05/01/2018   • INFLUENZA VACCINE  08/01/2018   • LIPID PANEL  11/01/2018        Subjective   History of Present Illness    Josefina Stephens is a 89 y.o. male who presents for an Subsequent Wellness Visit.    The following portions of the patient's history were reviewed and updated as appropriate: allergies, current medications, past family history, past medical history, past social history, past surgical history and problem list.    Outpatient Medications Prior to Visit   Medication Sig Dispense Refill   • amLODIPine (NORVASC) 10 MG tablet Take 10 mg by mouth Daily.     • bisoprolol-hydrochlorothiazide (ZIAC) 10-6.25 MG per tablet Take 1 tablet by mouth 2 (Two) Times a Day.     • cilostazol (PLETAL) 100 MG tablet Take 100 mg by mouth 2 (Two) Times a Day.     • isosorbide mononitrate (IMDUR) 60 MG 24 hr tablet Take 60 mg by mouth Daily.     • Multiple Vitamins-Minerals (MULTIVITAMIN ADULT PO) Take  by mouth.     • Omega-3 Fatty Acids (FISH OIL) 1200 MG capsule capsule Take  by mouth.     • omeprazole (priLOSEC) 20 MG capsule TAKE 1 CAPSULE TWICE DAILY 30 capsule 0   • pravastatin (PRAVACHOL) 40 MG tablet Take 40 mg by mouth Daily.     • Probiotic Product (PROBIOTIC DAILY PO) Take  by mouth.     • simethicone (MYLICON) 125 MG chewable tablet Chew 125 mg.     • XARELTO 20 MG tablet        No facility-administered medications prior to visit.        Patient Active Problem List   Diagnosis   • Hypertension   • Hyperlipidemia   • GERD (gastroesophageal reflux disease)   • Atrial fibrillation       Advance Care Planning:  has an advance directive - a copy has been provided and is in file    Identification of Risk Factors:  Risk factors include: cardiovascular risk.    Review of Systems    Compared to one year ago, the patient feels his physical health is the same.  Compared to one year ago,  the patient feels his mental health is the same.    Objective     Physical Exam    Vitals:    05/03/18 1122   BP: 138/86   BP Location: Left arm   Patient Position: Sitting   Cuff Size: Adult   Pulse: 88   Temp: 97.8 °F (36.6 °C)   TempSrc: Tympanic   SpO2: 95%   Weight: 87.5 kg (193 lb)   PainSc: 0-No pain       Patient's Body mass index is 26.92 kg/m². BMI is above normal parameters. Follow-up plan includes:  no follow-up required.      Assessment/Plan   Patient Self-Management and Personalized Health Advice  The patient has been provided with information about: prevention of cardiac or vascular disease and preventive services including:   · Counseling for cardiovascular disease risk reduction.    Visit Diagnoses:  No diagnosis found.    No orders of the defined types were placed in this encounter.      Outpatient Encounter Prescriptions as of 5/3/2018   Medication Sig Dispense Refill   • amLODIPine (NORVASC) 10 MG tablet Take 10 mg by mouth Daily.     • bisoprolol-hydrochlorothiazide (ZIAC) 10-6.25 MG per tablet Take 1 tablet by mouth 2 (Two) Times a Day.     • cilostazol (PLETAL) 100 MG tablet Take 100 mg by mouth 2 (Two) Times a Day.     • finasteride (PROSCAR) 5 MG tablet      • isosorbide mononitrate (IMDUR) 60 MG 24 hr tablet Take 60 mg by mouth Daily.     • Multiple Vitamins-Minerals (MULTIVITAMIN ADULT PO) Take  by mouth.     • Omega-3 Fatty Acids (FISH OIL) 1200 MG capsule capsule Take  by mouth.     • omeprazole (priLOSEC) 20 MG capsule TAKE 1 CAPSULE TWICE DAILY 30 capsule 0   • pravastatin (PRAVACHOL) 40 MG tablet Take 40 mg by mouth Daily.     • Probiotic Product (PROBIOTIC DAILY PO) Take  by mouth.     • simethicone (MYLICON) 125 MG chewable tablet Chew 125 mg.     • [DISCONTINUED] XARELTO 20 MG tablet        No facility-administered encounter medications on file as of 5/3/2018.        Reviewed use of high risk medication in the elderly: not applicable  Reviewed for potential of harmful drug  interactions in the elderly: not applicable    Follow Up:  No Follow-up on file.     An After Visit Summary and PPPS with all of these plans were given to the patient.

## 2018-05-03 NOTE — PROGRESS NOTES
Subjective   Josefina Stephens is a 89 y.o. male.     Chief Complaint   Patient presents with   • Annual Exam   • Atrial Fibrillation   • Hypertension   • Hyperlipidemia         History of Present Illness patient is an amazing man who is 89 and a very vibrant.  He had some bleeding in the urine and had a full cystoscopy with his urologist.  We are awaiting the results of that but there is no finding.  He is now off anticoagulant on just aspirin with a history of A. fib.  Dr. Orosco's cardiologist like for him to go back on anticoagulants at some point with a chads score which is elevated.  Otherwise his history of some melanotic stools worked up by gastroenterology and he is doing well at this point.  Get screening labs.  We discussed getting hepatitis A vaccine given the occurrence of hepatitis A in nightly 8.    The following portions of the patient's history were reviewed and updated as appropriate: allergies, current medications, past social history and problem list.    Review of Systems   Constitutional: Negative.    HENT: Negative.    Respiratory: Negative.    Cardiovascular: Negative.    Gastrointestinal: Negative.    Endocrine: Negative.    Genitourinary: Negative.    Musculoskeletal: Positive for arthralgias and back pain.   Skin: Negative.    Allergic/Immunologic: Negative.    Neurological: Negative.    Hematological: Negative.    Psychiatric/Behavioral: Negative.        Objective   Vitals:    05/03/18 1122   BP: 138/86   Pulse: 88   Temp: 97.8 °F (36.6 °C)   SpO2: 95%     Physical Exam   Constitutional: He is oriented to person, place, and time. He appears well-developed and well-nourished.   HENT:   Head: Normocephalic.   Right Ear: External ear normal.   Left Ear: External ear normal.   Mouth/Throat: Oropharynx is clear and moist.   Eyes: EOM are normal. Pupils are equal, round, and reactive to light.   Neck: Normal range of motion. Neck supple.   Cardiovascular: Normal heart sounds.  An irregularly  irregular rhythm present.   Pulmonary/Chest: Effort normal and breath sounds normal.   Abdominal: Soft. Bowel sounds are normal.   Musculoskeletal: Normal range of motion.   Neurological: He is alert and oriented to person, place, and time.   Skin: Skin is warm and dry.   Psychiatric: He has a normal mood and affect.   Nursing note and vitals reviewed.      Assessment/Plan   Problem List Items Addressed This Visit        Cardiovascular and Mediastinum    Atrial fibrillation    Relevant Orders    CBC & Differential    Comprehensive Metabolic Panel    Lipid Panel With / Chol / HDL Ratio    Iron and TIBC    Ferritin    Hypertension - Primary    Relevant Orders    CBC & Differential    Comprehensive Metabolic Panel    Lipid Panel With / Chol / HDL Ratio    Iron and TIBC    Ferritin    Hyperlipidemia    Relevant Orders    CBC & Differential    Comprehensive Metabolic Panel    Lipid Panel With / Chol / HDL Ratio    Iron and TIBC    Ferritin       Digestive    GERD (gastroesophageal reflux disease)    Relevant Orders    CBC & Differential    Comprehensive Metabolic Panel    Lipid Panel With / Chol / HDL Ratio    Iron and TIBC    Ferritin      Other Visit Diagnoses     Medicare annual wellness visit, subsequent        Relevant Orders    CBC & Differential    Comprehensive Metabolic Panel    Lipid Panel With / Chol / HDL Ratio    Iron and TIBC    Ferritin    Other iron deficiency anemia        Relevant Orders    CBC & Differential    Comprehensive Metabolic Panel    Lipid Panel With / Chol / HDL Ratio    Iron and TIBC    Ferritin      Plan: Screening labs going iron tests.  Recheck in 6 months.  Medicare wellness visit performed today.  Records from Dr. Benton pending

## 2018-05-04 LAB
ALBUMIN SERPL-MCNC: 4.3 G/DL (ref 3.5–5.2)
ALBUMIN/GLOB SERPL: 1.6 G/DL
ALP SERPL-CCNC: 57 U/L (ref 39–117)
ALT SERPL-CCNC: 10 U/L (ref 1–41)
AST SERPL-CCNC: 17 U/L (ref 1–40)
BASOPHILS # BLD AUTO: 0.03 10*3/MM3 (ref 0–0.2)
BASOPHILS NFR BLD AUTO: 0.5 % (ref 0–1.5)
BILIRUB SERPL-MCNC: 1 MG/DL (ref 0.1–1.2)
BUN SERPL-MCNC: 18 MG/DL (ref 8–23)
BUN/CREAT SERPL: 15.3 (ref 7–25)
CALCIUM SERPL-MCNC: 9.3 MG/DL (ref 8.2–9.6)
CHLORIDE SERPL-SCNC: 102 MMOL/L (ref 98–107)
CHOLEST SERPL-MCNC: 139 MG/DL (ref 0–200)
CHOLEST/HDLC SERPL: 3.31 {RATIO}
CO2 SERPL-SCNC: 27 MMOL/L (ref 22–29)
CREAT SERPL-MCNC: 1.18 MG/DL (ref 0.76–1.27)
EOSINOPHIL # BLD AUTO: 0.13 10*3/MM3 (ref 0–0.7)
EOSINOPHIL NFR BLD AUTO: 2.4 % (ref 0.3–6.2)
ERYTHROCYTE [DISTWIDTH] IN BLOOD BY AUTOMATED COUNT: 15.1 % (ref 11.5–14.5)
FERRITIN SERPL-MCNC: 44.27 NG/ML (ref 30–400)
GFR SERPLBLD CREATININE-BSD FMLA CKD-EPI: 58 ML/MIN/1.73
GFR SERPLBLD CREATININE-BSD FMLA CKD-EPI: 70 ML/MIN/1.73
GLOBULIN SER CALC-MCNC: 2.7 GM/DL
GLUCOSE SERPL-MCNC: 102 MG/DL (ref 65–99)
HCT VFR BLD AUTO: 42.7 % (ref 40.4–52.2)
HDLC SERPL-MCNC: 42 MG/DL (ref 40–60)
HGB BLD-MCNC: 13.9 G/DL (ref 13.7–17.6)
IMM GRANULOCYTES # BLD: 0 10*3/MM3 (ref 0–0.03)
IMM GRANULOCYTES NFR BLD: 0 % (ref 0–0.5)
IRON SATN MFR SERPL: 32 % (ref 20–50)
IRON SERPL-MCNC: 130 MCG/DL (ref 59–158)
LDLC SERPL CALC-MCNC: 79 MG/DL (ref 0–100)
LYMPHOCYTES # BLD AUTO: 1.49 10*3/MM3 (ref 0.9–4.8)
LYMPHOCYTES NFR BLD AUTO: 26.9 % (ref 19.6–45.3)
MCH RBC QN AUTO: 31.7 PG (ref 27–32.7)
MCHC RBC AUTO-ENTMCNC: 32.6 G/DL (ref 32.6–36.4)
MCV RBC AUTO: 97.5 FL (ref 79.8–96.2)
MONOCYTES # BLD AUTO: 0.67 10*3/MM3 (ref 0.2–1.2)
MONOCYTES NFR BLD AUTO: 12.1 % (ref 5–12)
NEUTROPHILS # BLD AUTO: 3.21 10*3/MM3 (ref 1.9–8.1)
NEUTROPHILS NFR BLD AUTO: 58.1 % (ref 42.7–76)
PLATELET # BLD AUTO: 225 10*3/MM3 (ref 140–500)
POTASSIUM SERPL-SCNC: 4.1 MMOL/L (ref 3.5–5.2)
PROT SERPL-MCNC: 7 G/DL (ref 6–8.5)
RBC # BLD AUTO: 4.38 10*6/MM3 (ref 4.6–6)
SODIUM SERPL-SCNC: 143 MMOL/L (ref 136–145)
TIBC SERPL-MCNC: 404 MCG/DL
TRIGL SERPL-MCNC: 89 MG/DL (ref 0–150)
UIBC SERPL-MCNC: 274 MCG/DL
VLDLC SERPL CALC-MCNC: 17.8 MG/DL (ref 5–40)
WBC # BLD AUTO: 5.53 10*3/MM3 (ref 4.5–10.7)

## 2018-05-30 ENCOUNTER — OFFICE VISIT (OUTPATIENT)
Dept: SLEEP MEDICINE | Facility: HOSPITAL | Age: 83
End: 2018-05-30
Attending: INTERNAL MEDICINE

## 2018-05-30 VITALS
HEART RATE: 74 BPM | DIASTOLIC BLOOD PRESSURE: 65 MMHG | SYSTOLIC BLOOD PRESSURE: 124 MMHG | WEIGHT: 193 LBS | HEIGHT: 71 IN | BODY MASS INDEX: 27.02 KG/M2

## 2018-05-30 DIAGNOSIS — G47.33 OSA ON CPAP: Primary | ICD-10-CM

## 2018-05-30 DIAGNOSIS — Z99.89 OSA ON CPAP: Primary | ICD-10-CM

## 2018-05-30 PROCEDURE — 99204 OFFICE O/P NEW MOD 45 MIN: CPT | Performed by: INTERNAL MEDICINE

## 2018-05-30 PROCEDURE — G0463 HOSPITAL OUTPT CLINIC VISIT: HCPCS

## 2018-06-02 PROBLEM — Z99.89 OSA ON CPAP: Status: ACTIVE | Noted: 2018-06-02

## 2018-06-02 PROBLEM — G47.33 OSA ON CPAP: Status: ACTIVE | Noted: 2018-06-02

## 2018-06-11 ENCOUNTER — TELEPHONE (OUTPATIENT)
Dept: SLEEP MEDICINE | Facility: HOSPITAL | Age: 83
End: 2018-06-11

## 2018-06-11 ENCOUNTER — DOCUMENTATION (OUTPATIENT)
Dept: SLEEP MEDICINE | Facility: HOSPITAL | Age: 83
End: 2018-06-11

## 2018-06-11 NOTE — TELEPHONE ENCOUNTER
Pt called explaining that [facility] had been in touch about PAP set up, and informed patient that because he lives in [place], [place], he is out of their service area. Per patient he currently gets supplies from [facility] as DME.  Tech faxed all set up paperwork to [facility] per pt request.

## 2018-06-22 ENCOUNTER — TELEPHONE (OUTPATIENT)
Dept: SLEEP MEDICINE | Facility: HOSPITAL | Age: 83
End: 2018-06-22

## 2018-06-22 NOTE — TELEPHONE ENCOUNTER
Tech spoke with  Stephens about set up thru [employer].  Pt reports he has still not been contacted by company.  Tech

## 2018-07-25 ENCOUNTER — APPOINTMENT (OUTPATIENT)
Dept: SLEEP MEDICINE | Facility: HOSPITAL | Age: 83
End: 2018-07-25
Attending: INTERNAL MEDICINE

## 2018-07-28 ENCOUNTER — TELEPHONE (OUTPATIENT)
Dept: SLEEP MEDICINE | Facility: HOSPITAL | Age: 83
End: 2018-07-28

## 2018-07-28 NOTE — TELEPHONE ENCOUNTER
The patient was here for follow-up but did not wait.  He stated that he is waiting for a replacement CPAP machine.  Downloads between July 5 and July 24, 2018 compliance 100%.  Average usage 8 hours and 43 minutes.  AHI normal with a leak of 1 hour and 11 minutes.  Average auto CPAP pressure is 10.6 and his auto CPAP is 8-12.

## 2019-01-03 ENCOUNTER — OFFICE VISIT (OUTPATIENT)
Dept: PODIATRY | Facility: CLINIC | Age: 84
End: 2019-01-03

## 2019-01-03 VITALS
WEIGHT: 199 LBS | HEIGHT: 71 IN | OXYGEN SATURATION: 96 % | SYSTOLIC BLOOD PRESSURE: 149 MMHG | DIASTOLIC BLOOD PRESSURE: 80 MMHG | BODY MASS INDEX: 27.86 KG/M2 | HEART RATE: 77 BPM

## 2019-01-03 DIAGNOSIS — M79.674 PAIN OF TOE OF RIGHT FOOT: ICD-10-CM

## 2019-01-03 DIAGNOSIS — L84 CORNS: ICD-10-CM

## 2019-01-03 DIAGNOSIS — M20.41 HAMMER TOES OF BOTH FEET: Primary | ICD-10-CM

## 2019-01-03 DIAGNOSIS — M20.42 HAMMER TOES OF BOTH FEET: Primary | ICD-10-CM

## 2019-01-03 PROCEDURE — 99202 OFFICE O/P NEW SF 15 MIN: CPT | Performed by: PODIATRIST

## 2019-01-03 NOTE — PROGRESS NOTES
Josefina Stephens  12/17/1926  89 y.o. male     Patient presents to office today with complaint of corn between his 4th and 5th toes on his right foot.    01/03/2019  Chief Complaint   Patient presents with   • Right Foot - Pain           History of Present Illness    Josefina Stephens is a 89 y.o. male  who presents for evaluation of right foot pain.  He states the pain seems to be revolved around a corn on the inside of his right fifth toe.  He describes pain as sharp and worse with close toed shoe gear.  He does note that it has been improving over the past week or 2.  He does use a variety of over-the-counter pads and corn remover products.  He denies any other acute pedal complaints today.    Past Medical History:   Diagnosis Date   • Arthritis    • Atrial fibrillation (CMS/HCC)    • Duodenal ulcer     HX   • GERD (gastroesophageal reflux disease)    • GI (gastrointestinal bleed)     HX   • Gout    • Hyperlipidemia    • Hypertension    • Prostate cancer (CMS/HCC)    • Skin cancer    • Sleep apnea    • TIA (transient ischemic attack)          Past Surgical History:   Procedure Laterality Date   • COLONOSCOPY  1955   • COLONOSCOPY N/A 7/6/2017    Procedure: COLONOSCOPY to cecum ;  Surgeon: Faisal Mac MD;  Location: [facility] ENDOSCOPY;  Service:    • CORONARY ANGIOPLASTY WITH STENT PLACEMENT     • ENDOSCOPY N/A 7/6/2017    Procedure: ESOPHAGOGASTRODUODENOSCOPY with biopsies;  Surgeon: Faisal Mac MD;  Location: [facility] ENDOSCOPY;  Service:    • HERNIA REPAIR     • PROSTATE RADIOACTIVE SEED IMPLANT     • SKIN BIOPSY     • UPPER GASTROINTESTINAL ENDOSCOPY  11/12/2008    GI bleed, peptic ulcer disease, melena, treated w/heater probe         Family History   Problem Relation Age of Onset   • Heart disease Father          Social History     Socioeconomic History   • Marital status:      Spouse name: Not on file   • Number of children: Not on file   • Years of education: Not on file   • Highest education level:  "Not on file   Social Needs   • Financial resource strain: Not on file   • Food insecurity - worry: Not on file   • Food insecurity - inability: Not on file   • Transportation needs - medical: Not on file   • Transportation needs - non-medical: Not on file   Occupational History   • Not on file   Tobacco Use   • Smoking status: Never Smoker   Substance and Sexual Activity   • Alcohol use: No   • Drug use: No   • Sexual activity: Not on file   Other Topics Concern   • Not on file   Social History Narrative   • Not on file         Current Outpatient Medications   Medication Sig Dispense Refill   • amLODIPine (NORVASC) 10 MG tablet Take 10 mg by mouth Daily.     • apixaban (ELIQUIS) 5 MG tablet tablet Take 5 mg by mouth.     • bisoprolol-hydrochlorothiazide (ZIAC) 10-6.25 MG per tablet Take 1 tablet by mouth 2 (Two) Times a Day.     • cilostazol (PLETAL) 100 MG tablet Take 100 mg by mouth 2 (Two) Times a Day.     • finasteride (PROSCAR) 5 MG tablet      • isosorbide mononitrate (IMDUR) 60 MG 24 hr tablet Take 60 mg by mouth Daily.     • omeprazole (priLOSEC) 20 MG capsule TAKE 1 CAPSULE TWICE DAILY 30 capsule 0   • pravastatin (PRAVACHOL) 40 MG tablet Take 40 mg by mouth Daily.     • Multiple Vitamins-Minerals (MULTIVITAMIN ADULT PO) Take  by mouth.     • Omega-3 Fatty Acids (FISH OIL) 1200 MG capsule capsule Take  by mouth.     • Probiotic Product (PROBIOTIC DAILY PO) Take  by mouth.     • simethicone (MYLICON) 125 MG chewable tablet Chew 125 mg.       No current facility-administered medications for this visit.          OBJECTIVE    /80   Pulse 77   Ht 179.1 cm (70.5\")   Wt 90.3 kg (199 lb)   SpO2 96%   BMI 28.15 kg/m²       Review of Systems   Constitutional: Negative.    HENT: Positive for hearing loss.    Eyes: Negative.    Respiratory: Negative.    Cardiovascular: Positive for leg swelling.   Gastrointestinal: Positive for blood in stool and constipation.   Endocrine: Negative.    Genitourinary: " Negative.    Musculoskeletal: Positive for arthralgias and back pain.        Foot pain, joint pain   Skin: Negative.    Allergic/Immunologic: Negative.    Neurological: Negative.    Hematological: Negative.    Psychiatric/Behavioral: Negative.          Physical Exam     Constitutional: he appears well-developed and well-nourished.   HEENT: Normocephalic. Atraumatic  CV: No tenderness. RRR  Resp: Non-labored respiration. No wheezes.   Psychiatric: he has a normal mood and affect. his   behavior is normal.      Lower Extremity Exam:  Vascular: DP/PT pulses palpable 1+.   Negative hair growth.   No edema  Neuro: Protective sensation diminished to lesser toes, b/l.  DTRs intact  Integument: No open wounds  Interdigital heloma dura to right 4th interspace  Atrophic skin noted b/l   No masses  Webspaces c/d/i  Musculoskeletal: LE muscle strength 5/5.   Gait Normal  Semi rigid hammertoe deformity toes 2-5, b/l.  Nails 1-5 b/l thickened  Ankle ROM decreased, b/l              ASSESSMENT AND PLAN    Josefina was seen today for pain.    Diagnoses and all orders for this visit:    Hammer toes of both feet    Corns    Pain of toe of right foot      -Comprehensive foot and ankle exam performed  -Educated pt on diagnosis, etiology and treatment of hammertoe deformity with concurrent interdigital corns  -Recommend soft, wide toe box accommodative shoe gear.  -Dispensed toe spacers and toe sleeves.  -Recheck  as needed.          This document has been electronically signed by Gerard Rowley DPM on January 9, 2019 6:14 PM     EMR Dragon/Transcription disclaimer:   Much of this encounter note is an electronic transcription/translation of spoken language to printed text. The electronic translation of spoken language may permit erroneous, or at times, nonsensical words or phrases to be inadvertently transcribed; Although I have reviewed the note for such errors, some may still exist.    Gerard Rowley DPM  1/9/2019  6:14  PM

## 2019-02-25 ENCOUNTER — OFFICE VISIT (OUTPATIENT)
Dept: INTERNAL MEDICINE | Facility: CLINIC | Age: 84
End: 2019-02-25

## 2019-02-25 VITALS
BODY MASS INDEX: 27.73 KG/M2 | HEART RATE: 78 BPM | WEIGHT: 196 LBS | SYSTOLIC BLOOD PRESSURE: 130 MMHG | TEMPERATURE: 97.2 F | DIASTOLIC BLOOD PRESSURE: 82 MMHG | OXYGEN SATURATION: 96 %

## 2019-02-25 DIAGNOSIS — I10 ESSENTIAL HYPERTENSION: Primary | ICD-10-CM

## 2019-02-25 DIAGNOSIS — E78.2 MIXED HYPERLIPIDEMIA: ICD-10-CM

## 2019-02-25 DIAGNOSIS — I48.20 CHRONIC ATRIAL FIBRILLATION (HCC): ICD-10-CM

## 2019-02-25 DIAGNOSIS — Z99.89 OSA ON CPAP: ICD-10-CM

## 2019-02-25 DIAGNOSIS — D50.0 IRON DEFICIENCY ANEMIA DUE TO CHRONIC BLOOD LOSS: ICD-10-CM

## 2019-02-25 DIAGNOSIS — K21.9 GASTROESOPHAGEAL REFLUX DISEASE WITHOUT ESOPHAGITIS: ICD-10-CM

## 2019-02-25 DIAGNOSIS — C61 PROSTATE CANCER (HCC): ICD-10-CM

## 2019-02-25 DIAGNOSIS — R73.09 ELEVATED GLUCOSE: ICD-10-CM

## 2019-02-25 DIAGNOSIS — G47.33 OSA ON CPAP: ICD-10-CM

## 2019-02-25 PROCEDURE — 99214 OFFICE O/P EST MOD 30 MIN: CPT | Performed by: FAMILY MEDICINE

## 2019-02-25 NOTE — PROGRESS NOTES
Subjective   Josefina Stephens is a 89 y.o. male.     Chief Complaint   Patient presents with   • Anemia   • Atrial Fibrillation   • GI Problem         History of Present Illness   Delightful gentleman who is remarkably active.  History includes atrial fibrillation today seems to be in sinus rhythm with some ectopic PACs.  Otherwise he had some orthopedic problems which seemed quiescent.  The occasional aches and pains and hip pain.  In 2017 he had evidence of subacute GI bleed is worked up for iron deficient anemia.  Otherwise he sees a dermatologist in [facility] for skin problems and actinic keratosis recurrences.  He has been treated for prostate cancer with prostate radiation seeds.  [facility]  He is living in [facility] but does live in [place] periodically.      The following portions of the patient's history were reviewed and updated as appropriate: allergies, current medications, past social history and problem list.    Review of Systems   Constitutional: Negative.    HENT: Negative.    Eyes: Negative.    Respiratory: Negative.    Cardiovascular: Negative.    Gastrointestinal: Negative.    Endocrine: Negative.    Genitourinary: Negative.    Musculoskeletal: Positive for arthralgias and myalgias.   Skin: Negative.    Allergic/Immunologic: Negative.    Neurological: Negative.    Hematological: Negative.    Psychiatric/Behavioral: Negative.        Objective   Vitals:    02/25/19 1254   BP: 130/82   Pulse: 78   Temp: 97.2 °F (36.2 °C)   SpO2: 96%     Physical Exam   Constitutional: He is oriented to person, place, and time. He appears well-developed and well-nourished.   HENT:   Head: Normocephalic and atraumatic.   Right Ear: Tympanic membrane and external ear normal.   Left Ear: Tympanic membrane and external ear normal.   Nose: Nose normal.   Mouth/Throat: Oropharynx is clear and moist.   Eyes: Conjunctivae and EOM are normal. Pupils are equal, round,  and reactive to light.   Neck: Normal range of motion. Neck supple. No JVD present. No thyromegaly present.   Cardiovascular: Normal rate, regular rhythm, normal heart sounds and intact distal pulses. Frequent extrasystoles are present.   Pulmonary/Chest: Effort normal and breath sounds normal.   Abdominal: Soft. Bowel sounds are normal.   Musculoskeletal:        Right hip: He exhibits decreased range of motion.        Left hip: He exhibits decreased range of motion.   Lymphadenopathy:     He has no cervical adenopathy.   Neurological: He is alert and oriented to person, place, and time. No cranial nerve deficit. Coordination normal.   Skin: Skin is warm and dry. No rash noted.   Psychiatric: He has a normal mood and affect. His behavior is normal. Judgment and thought content normal.   Vitals reviewed.      Assessment/Plan   Problem List Items Addressed This Visit        Cardiovascular and Mediastinum    Atrial fibrillation (CMS/HCC)    Relevant Orders    CBC & Differential    Comprehensive Metabolic Panel    Lipid Panel With / Chol / HDL Ratio    Urinalysis With Microscopic If Indicated (No Culture) - Urine, Clean Catch    TSH    T4, Free    T3, Free    Sedimentation Rate    Iron and TIBC    Ferritin    Hemoglobin A1c    PSA, Total & Free    Hypertension - Primary    Relevant Orders    CBC & Differential    Comprehensive Metabolic Panel    Lipid Panel With / Chol / HDL Ratio    Urinalysis With Microscopic If Indicated (No Culture) - Urine, Clean Catch    TSH    T4, Free    T3, Free    Sedimentation Rate    Iron and TIBC    Ferritin    Hemoglobin A1c    PSA, Total & Free    Hyperlipidemia    Relevant Orders    CBC & Differential    Comprehensive Metabolic Panel    Lipid Panel With / Chol / HDL Ratio    Urinalysis With Microscopic If Indicated (No Culture) - Urine, Clean Catch    TSH    T4, Free    T3, Free    Sedimentation Rate    Iron and TIBC    Ferritin    Hemoglobin A1c    PSA, Total & Free       Respiratory     GALO on CPAP +9       Digestive    GERD (gastroesophageal reflux disease)    Relevant Orders    CBC & Differential    Comprehensive Metabolic Panel    Lipid Panel With / Chol / HDL Ratio    Urinalysis With Microscopic If Indicated (No Culture) - Urine, Clean Catch    TSH    T4, Free    T3, Free    Sedimentation Rate    Iron and TIBC    Ferritin    Hemoglobin A1c    PSA, Total & Free       Hematopoietic and Hemostatic    Iron deficiency anemia due to chronic blood loss    Relevant Orders    CBC & Differential    Comprehensive Metabolic Panel    Lipid Panel With / Chol / HDL Ratio    Urinalysis With Microscopic If Indicated (No Culture) - Urine, Clean Catch    TSH    T4, Free    T3, Free    Sedimentation Rate    Iron and TIBC    Ferritin    Hemoglobin A1c    PSA, Total & Free      Other Visit Diagnoses     Elevated glucose        Relevant Orders    CBC & Differential    Comprehensive Metabolic Panel    Lipid Panel With / Chol / HDL Ratio    Urinalysis With Microscopic If Indicated (No Culture) - Urine, Clean Catch    TSH    T4, Free    T3, Free    Sedimentation Rate    Iron and TIBC    Ferritin    Hemoglobin A1c    PSA, Total & Free    Prostate cancer (CMS/HCC)        Relevant Orders    PSA, Total & Free      Labs including PSA test meds remain the same is on Eliquis for anticoagulant without problems.  Recheck in 6 months for Medicare wellness.

## 2019-02-26 LAB
ALBUMIN SERPL-MCNC: 4.7 G/DL (ref 3.5–5.2)
ALBUMIN/GLOB SERPL: 1.4 G/DL
ALP SERPL-CCNC: 53 U/L (ref 39–117)
ALT SERPL-CCNC: 18 U/L (ref 1–41)
APPEARANCE UR: CLEAR
AST SERPL-CCNC: 21 U/L (ref 1–40)
BASOPHILS # BLD AUTO: 0.02 10*3/MM3 (ref 0–0.2)
BASOPHILS NFR BLD AUTO: 0.4 % (ref 0–1.5)
BILIRUB SERPL-MCNC: 0.8 MG/DL (ref 0.1–1.2)
BILIRUB UR QL STRIP: NEGATIVE
BUN SERPL-MCNC: 24 MG/DL (ref 8–23)
BUN/CREAT SERPL: 18.6 (ref 7–25)
CALCIUM SERPL-MCNC: 10.4 MG/DL (ref 8.2–9.6)
CHLORIDE SERPL-SCNC: 101 MMOL/L (ref 98–107)
CHOLEST SERPL-MCNC: 152 MG/DL (ref 0–200)
CHOLEST/HDLC SERPL: 3.1 {RATIO}
CO2 SERPL-SCNC: 27.5 MMOL/L (ref 22–29)
COLOR UR: YELLOW
CREAT SERPL-MCNC: 1.29 MG/DL (ref 0.76–1.27)
EOSINOPHIL # BLD AUTO: 0.09 10*3/MM3 (ref 0–0.4)
EOSINOPHIL NFR BLD AUTO: 1.6 % (ref 0.3–6.2)
ERYTHROCYTE [DISTWIDTH] IN BLOOD BY AUTOMATED COUNT: 15.2 % (ref 12.3–15.4)
ERYTHROCYTE [SEDIMENTATION RATE] IN BLOOD BY WESTERGREN METHOD: 20 MM/HR (ref 0–20)
FERRITIN SERPL-MCNC: 90.2 NG/ML (ref 30–400)
GLOBULIN SER CALC-MCNC: 3.4 GM/DL
GLUCOSE SERPL-MCNC: 104 MG/DL (ref 65–99)
GLUCOSE UR QL: NEGATIVE
HBA1C MFR BLD: 5.7 % (ref 4.8–5.6)
HCT VFR BLD AUTO: 45.7 % (ref 37.5–51)
HDLC SERPL-MCNC: 49 MG/DL (ref 40–60)
HGB BLD-MCNC: 14.8 G/DL (ref 13–17.7)
HGB UR QL STRIP: NEGATIVE
IMM GRANULOCYTES # BLD AUTO: 0.01 10*3/MM3 (ref 0–0.05)
IMM GRANULOCYTES NFR BLD AUTO: 0.2 % (ref 0–0.5)
IRON SATN MFR SERPL: 31 % (ref 20–50)
IRON SERPL-MCNC: 126 MCG/DL (ref 59–158)
KETONES UR QL STRIP: NEGATIVE
LDLC SERPL CALC-MCNC: 83 MG/DL (ref 0–100)
LEUKOCYTE ESTERASE UR QL STRIP: NEGATIVE
LYMPHOCYTES # BLD AUTO: 1.73 10*3/MM3 (ref 0.7–3.1)
LYMPHOCYTES NFR BLD AUTO: 31.6 % (ref 19.6–45.3)
MCH RBC QN AUTO: 31.2 PG (ref 26.6–33)
MCHC RBC AUTO-ENTMCNC: 32.4 G/DL (ref 31.5–35.7)
MCV RBC AUTO: 96.2 FL (ref 79–97)
MONOCYTES # BLD AUTO: 0.62 10*3/MM3 (ref 0.1–0.9)
MONOCYTES NFR BLD AUTO: 11.3 % (ref 5–12)
NEUTROPHILS # BLD AUTO: 3 10*3/MM3 (ref 1.4–7)
NEUTROPHILS NFR BLD AUTO: 54.9 % (ref 42.7–76)
NITRITE UR QL STRIP: NEGATIVE
NRBC BLD AUTO-RTO: 0 /100 WBC (ref 0–0)
PH UR STRIP: 6 [PH] (ref 5–8)
PLATELET # BLD AUTO: 255 10*3/MM3 (ref 140–450)
POTASSIUM SERPL-SCNC: 4.2 MMOL/L (ref 3.5–5.2)
PROT SERPL-MCNC: 8.1 G/DL (ref 6–8.5)
PROT UR QL STRIP: NEGATIVE
PSA FREE MFR SERPL: 16.7 %
PSA FREE SERPL-MCNC: 0.05 NG/ML
PSA SERPL-MCNC: 0.3 NG/ML (ref 0–4)
RBC # BLD AUTO: 4.75 10*6/MM3 (ref 4.14–5.8)
SODIUM SERPL-SCNC: 138 MMOL/L (ref 136–145)
SP GR UR: 1.01 (ref 1–1.03)
T3FREE SERPL-MCNC: 2.8 PG/ML (ref 2–4.4)
T4 FREE SERPL-MCNC: 1.22 NG/DL (ref 0.93–1.7)
TIBC SERPL-MCNC: 405 MCG/DL
TRIGL SERPL-MCNC: 101 MG/DL (ref 0–150)
TSH SERPL DL<=0.005 MIU/L-ACNC: 1.42 MIU/ML (ref 0.27–4.2)
UIBC SERPL-MCNC: 279 MCG/DL
UROBILINOGEN UR STRIP-MCNC: NORMAL MG/DL
VLDLC SERPL CALC-MCNC: 20.2 MG/DL (ref 5–40)
WBC # BLD AUTO: 5.47 10*3/MM3 (ref 3.4–10.8)

## 2019-11-06 ENCOUNTER — HOSPITAL ENCOUNTER (OUTPATIENT)
Dept: ULTRASOUND IMAGING | Facility: HOSPITAL | Age: 84
Discharge: HOME | End: 2019-11-06
Admitting: NURSE PRACTITIONER

## 2019-11-06 DIAGNOSIS — M79.605 PAIN OF LEFT LOWER EXTREMITY: ICD-10-CM

## 2019-11-06 PROCEDURE — 93971 EXTREMITY STUDY: CPT

## 2020-09-24 ENCOUNTER — OFFICE VISIT (OUTPATIENT)
Dept: PODIATRY | Facility: CLINIC | Age: 85
End: 2020-09-24

## 2020-09-24 VITALS — HEIGHT: 70 IN | OXYGEN SATURATION: 97 % | HEART RATE: 80 BPM | WEIGHT: 196 LBS | BODY MASS INDEX: 28.06 KG/M2

## 2020-09-24 DIAGNOSIS — M20.42 HAMMER TOES OF BOTH FEET: ICD-10-CM

## 2020-09-24 DIAGNOSIS — M77.41 METATARSALGIA OF BOTH FEET: ICD-10-CM

## 2020-09-24 DIAGNOSIS — M76.61 ACHILLES TENDINITIS OF RIGHT LOWER EXTREMITY: Primary | ICD-10-CM

## 2020-09-24 DIAGNOSIS — M77.42 METATARSALGIA OF BOTH FEET: ICD-10-CM

## 2020-09-24 DIAGNOSIS — M20.41 HAMMER TOES OF BOTH FEET: ICD-10-CM

## 2020-09-24 DIAGNOSIS — L84 FOOT CALLUS: ICD-10-CM

## 2020-09-24 PROCEDURE — 99213 OFFICE O/P EST LOW 20 MIN: CPT | Performed by: PODIATRIST

## 2020-11-04 ENCOUNTER — OFFICE VISIT (OUTPATIENT)
Dept: CARDIOLOGY | Facility: CLINIC | Age: 85
End: 2020-11-04

## 2020-11-04 VITALS
BODY MASS INDEX: 29.58 KG/M2 | HEART RATE: 73 BPM | HEIGHT: 70 IN | OXYGEN SATURATION: 99 % | WEIGHT: 206.6 LBS | SYSTOLIC BLOOD PRESSURE: 136 MMHG | DIASTOLIC BLOOD PRESSURE: 84 MMHG

## 2020-11-04 DIAGNOSIS — I25.10 CORONARY ARTERY DISEASE INVOLVING NATIVE CORONARY ARTERY OF NATIVE HEART WITHOUT ANGINA PECTORIS: ICD-10-CM

## 2020-11-04 DIAGNOSIS — I10 ESSENTIAL HYPERTENSION: ICD-10-CM

## 2020-11-04 DIAGNOSIS — E78.2 MIXED HYPERLIPIDEMIA: ICD-10-CM

## 2020-11-04 DIAGNOSIS — I48.91 ATRIAL FIBRILLATION, UNSPECIFIED TYPE (HCC): Primary | ICD-10-CM

## 2020-11-04 PROBLEM — G45.9 TIA (TRANSIENT ISCHEMIC ATTACK): Status: ACTIVE | Noted: 2020-11-04

## 2020-11-04 PROCEDURE — 99204 OFFICE O/P NEW MOD 45 MIN: CPT | Performed by: INTERNAL MEDICINE

## 2020-11-04 PROCEDURE — 93000 ELECTROCARDIOGRAM COMPLETE: CPT | Performed by: INTERNAL MEDICINE

## 2020-11-04 NOTE — PROGRESS NOTES
Josefina Stephens Jr.  89 y.o. male    11/04/2020  1. Atrial fibrillation, unspecified type (CMS/HCC)    2. Essential hypertension    3. Mixed hyperlipidemia    4. Coronary artery disease involving native coronary artery of native heart without angina pectoris        History of Present Illness  Josefina Stephens is a 89-year-old male who is being seen by me for the first time.  He is here to establish with our practice.  He has multiple medical issues and his history is remarkable for coronary artery disease with history of PCI with a bare-metal stent to the right coronary artery in 2000, persistent atrial fibrillation of unknown duration, hypertension, hyperlipidemia, peripheral vascular disease, sleep apnea, history of cerebrovascular event, prostate carcinoma.  He has followed up with cardiologist at [facility] in [place], in the past.  He wishes to continue to see him on a yearly basis and since he moved to [place], [place] to live with his son recently and  wishes to see a cardiologist locally as well.      In spite of his multiple medical issues and advanced age he has done quite well from a cardiac standpoint and denied any chest pain, shortness of breath, palpitation, dizziness or syncope.  He has been compliant with all his medicines.  He had bleeding issues with Xarelto in the past but has tolerated apixaban quite well.    His cardiac catheterization was in 2000 when he had a 3.0 x 28 mm Velocity stent placed in the mid to distal RCA.  He underwent carotid endarterectomy in April 2014.    His last echocardiogram was in April 2019 when he underwent transesophageal echocardiogram which showed ejection fraction of 55 to 60%.  No PFO was noted.  No significant valve abnormalities were noted.    EKG today showed atrial fibrillation with heart rate of 79 bpm.  Baseline artifacts.  Old inferior wall MI cannot be ruled out.  Nonspecific ST-T changes.    Clinical exam today revealed normal blood pressure,  atrial fibrillation with controlled ventricular response, and no signs of CHF      SUBJECTIVE    Allergies   Allergen Reactions   • Lotrel [Amlodipine Besy-Benazepril Hcl] Swelling   • Aliskiren    • Colesevelam Diarrhea   • Contrast Dye    • Influenza Virus Vaccine Split    • Lipitor [Atorvastatin]    • Penicillins    • Ticlopidine Rash         Past Medical History:   Diagnosis Date   • Arthritis    • Atrial fibrillation (CMS/HCC)    • Duodenal ulcer     HX   • GERD (gastroesophageal reflux disease)    • GI (gastrointestinal bleed)     HX   • Gout    • Hyperlipidemia    • Hypertension    • Prostate cancer (CMS/HCC)    • Skin cancer    • Sleep apnea    • TIA (transient ischemic attack)          Past Surgical History:   Procedure Laterality Date   • COLONOSCOPY  1955   • COLONOSCOPY N/A 7/6/2017    Procedure: COLONOSCOPY to cecum ;  Surgeon: Faisal Mac MD;  Location: [facility] ENDOSCOPY;  Service:    • CORONARY ANGIOPLASTY WITH STENT PLACEMENT     • ENDOSCOPY N/A 7/6/2017    Procedure: ESOPHAGOGASTRODUODENOSCOPY with biopsies;  Surgeon: Faisal Mac MD;  Location: [facility] ENDOSCOPY;  Service:    • HERNIA REPAIR     • PROSTATE RADIOACTIVE SEED IMPLANT     • SKIN BIOPSY     • UPPER GASTROINTESTINAL ENDOSCOPY  11/12/2008    GI bleed, peptic ulcer disease, melena, treated w/heater probe         Family History   Problem Relation Age of Onset   • Heart disease Father          Social History     Socioeconomic History   • Marital status:      Spouse name: Not on file   • Number of children: Not on file   • Years of education: Not on file   • Highest education level: Not on file   Tobacco Use   • Smoking status: Never Smoker   Substance and Sexual Activity   • Alcohol use: No   • Drug use: No         Current Outpatient Medications   Medication Sig Dispense Refill   • Acetaminophen (TYLENOL PO) Take  by mouth As Needed.     • amLODIPine (NORVASC) 10 MG tablet Take 10 mg by mouth Daily.     • apixaban (ELIQUIS) 5 MG  "tablet tablet Take 5 mg by mouth.     • bisoprolol-hydrochlorothiazide (ZIAC) 10-6.25 MG per tablet Take 1 tablet by mouth 2 (Two) Times a Day.     • cilostazol (PLETAL) 100 MG tablet Take 100 mg by mouth 2 (Two) Times a Day.     • isosorbide mononitrate (IMDUR) 60 MG 24 hr tablet Take 60 mg by mouth Daily.     • Multiple Vitamins-Minerals (MULTIVITAMIN ADULT PO) Take  by mouth.     • pravastatin (PRAVACHOL) 40 MG tablet Take 40 mg by mouth Daily.     • simethicone (MYLICON) 125 MG chewable tablet Chew 125 mg.       No current facility-administered medications for this visit.          OBJECTIVE    /84 (BP Location: Left arm, Patient Position: Sitting, Cuff Size: Adult)   Pulse 73   Ht 177.8 cm (70\")   Wt 93.7 kg (206 lb 9.6 oz)   SpO2 99%   BMI 29.64 kg/m²         Review of Systems     Constitutional:  Denies recent weight loss, weight gain, fever or chills, no change in exercise tolerance     HENT:   hearing loss, no epistaxis, hoarseness, or difficulty speaking.     Eyes: Wears eyeglasses or contact lenses     Respiratory:  Denies dyspnea with exertion,no cough, wheezing, or hemoptysis.     Cardiovascular: Negative for palpations, chest pain, orthopnea, PND    Gastrointestinal:  Denies change in bowel habits, dyspepsia, ulcer disease, hematochezia, or melena.     Endocrine: Negative for cold intolerance, heat intolerance, polydipsia, polyphagia and polyuria.     Genitourinary: h/o prostate carcinoma      Musculoskeletal: Denies any history of arthritic symptoms or back problems     Skin:  Denies any change in hair or nails, rashes, or skin lesions.     Allergic/Immunologic: Negative.  Negative for environmental allergies, food allergies and immunocompromised state.     Neurological: TIA    Hematological: Denies any food allergies, seasonal allergies, bleeding disorders, or lymphadenopathy.     Psychiatric/Behavioral: Denies any history of depression, substance abuse, or change in cognitive function. "       Physical Exam     Constitutional: Cooperative, alert and oriented, well-developed, well-nourished, in no acute distress.     HENT:   Head: Normocephalic, normal hair patterns, no masses or tenderness.  Ears, Nose, and Throat: No gross abnormalities. No pallor or cyanosis. Eyes: EOMS intact, PERRL, conjunctivae and lids unremarkable. Fundoscopic exam and visual fields not performed.   Neck: No palpable masses or adenopathy, no thyromegaly, no JVD, carotid pulses are full and equal bilaterally and without  Bruits.     Cardiovascular: Irregular rhythm, S1 variable, S2 normal, no S3 or S4.  No murmurs, gallops, or rubs detected.     Pulmonary/Chest: Chest: normal symmetry, normal respiratory excursion, no intercostal retraction, no use of accessory muscles.            Pulmonary: Normal breath sounds. No rales or ronchi.    Abdominal: Abdomen soft, bowel sounds normoactive, no masses, no hepatosplenomegaly, non-tender, no bruits.     Musculoskeletal: No deformities, clubbing, cyanosis, erythema, or edema observed.     Neurological: No gross motor or sensory deficits noted, affect appropriate, oriented to time, person, place.     Skin: Warm and dry to the touch, no apparent skin lesions or masses noted.     Psychiatric: He has a normal mood and affect. His behavior is normal. Judgment and thought content normal.         Procedures      Lab Results   Component Value Date    WBC 5.47 02/25/2019    HGB 14.8 02/25/2019    HCT 45.7 02/25/2019    MCV 96.2 02/25/2019     02/25/2019     Lab Results   Component Value Date    GLUCOSE 105 (H) 04/15/2014    BUN 24 (H) 02/25/2019    CREATININE 1.29 (H) 02/25/2019    EGFRIFNONA 52 (L) 02/25/2019    EGFRIFAFRI 63 02/25/2019    BCR 18.6 02/25/2019    CO2 27.5 02/25/2019    CALCIUM 10.4 (H) 02/25/2019    PROTENTOTREF 8.1 02/25/2019    ALBUMIN 4.70 02/25/2019    LABIL2 1.4 02/25/2019    AST 21 02/25/2019    ALT 18 02/25/2019     No results found for: CHOL  Lab Results    Component Value Date    TRIG 101 02/25/2019    TRIG 89 05/03/2018    TRIG 111 11/01/2017     Lab Results   Component Value Date    HDL 49 02/25/2019    HDL 42 05/03/2018    HDL 48 11/01/2017     No components found for: LDLCALC  Lab Results   Component Value Date    LDL 83 02/25/2019    LDL 79 05/03/2018    LDL 88 11/01/2017     No results found for: HDLLDLRATIO  No components found for: CHOLHDL  Lab Results   Component Value Date    HGBA1C 5.70 (H) 02/25/2019     Lab Results   Component Value Date    TSH 1.420 02/25/2019           ASSESSMENT AND PLAN  Mr. Stephens has multiple medical problems as described in detail in the history of present illness but stable from a cardiac standpoint with no signs of angina or CHF. He has rate controlled atrial fibrillation. PVD is stable.  He has no evidence of progression of coronary artery disease.  I have continued his present medications including anticoagulation with Eliquis, antihypertensive therapy with Ziac, amlodipine, antianginal therapy with isosorbide mononitrate and statin therapy with pravastatin.  He is able to perform his activities of daily living in spite of his advanced age.  An Echocardiogram to assess LV function and valves are being arranged.    Diagnoses and all orders for this visit:    1. Atrial fibrillation, unspecified type (CMS/HCC) (Primary)  -     ECG 12 Lead    2. Essential hypertension    3. Mixed hyperlipidemia    4. Coronary artery disease involving native coronary artery of native heart without angina pectoris        Patient's Body mass index is 29.64 kg/m². BMI is above normal parameters. Recommendations include: exercise counseling and nutrition counseling.      Josefina Stephens .  reports that he has never smoked. He does not have any smokeless tobacco history on file.    Govind Sweeney MD  11/4/2020  08:23 CST

## 2020-11-05 LAB
QT INTERVAL: 390 MS
QTC INTERVAL: 447 MS

## 2020-11-13 ENCOUNTER — OFFICE VISIT (OUTPATIENT)
Dept: PODIATRY | Facility: CLINIC | Age: 85
End: 2020-11-13

## 2020-11-13 VITALS — HEART RATE: 70 BPM | HEIGHT: 70 IN | BODY MASS INDEX: 29.58 KG/M2 | OXYGEN SATURATION: 99 % | WEIGHT: 206.6 LBS

## 2020-11-13 DIAGNOSIS — L84 FOOT CALLUS: ICD-10-CM

## 2020-11-13 DIAGNOSIS — M20.42 HAMMER TOES OF BOTH FEET: ICD-10-CM

## 2020-11-13 DIAGNOSIS — M77.41 METATARSALGIA OF BOTH FEET: Primary | ICD-10-CM

## 2020-11-13 DIAGNOSIS — M77.42 METATARSALGIA OF BOTH FEET: Primary | ICD-10-CM

## 2020-11-13 DIAGNOSIS — M20.41 HAMMER TOES OF BOTH FEET: ICD-10-CM

## 2020-11-13 PROCEDURE — 99212 OFFICE O/P EST SF 10 MIN: CPT | Performed by: PODIATRIST

## 2020-11-13 NOTE — PROGRESS NOTES
Josefina Stephnes Jr.  12/17/1926  89 y.o. male     Patient presents to office today with a complaint of callous on his left foot.     11/13/2020      Chief Complaint   Patient presents with   • Left Foot - Callouses           History of Present Illness    Josefina Stephens Jr. is a 89 y.o. male  who presents for evaluation of recurrent left forefoot pain.  He feels these are secondary to painful skin lesions in the ball of his foot.      Past Medical History:   Diagnosis Date   • Arthritis    • Atrial fibrillation (CMS/HCC)    • Callus    • Duodenal ulcer     HX   • GERD (gastroesophageal reflux disease)    • GI (gastrointestinal bleed)     HX   • Gout    • Hyperlipidemia    • Hypertension    • Prostate cancer (CMS/HCC)    • Skin cancer    • Sleep apnea    • TIA (transient ischemic attack)          Past Surgical History:   Procedure Laterality Date   • COLONOSCOPY  1955   • COLONOSCOPY N/A 7/6/2017    Procedure: COLONOSCOPY to cecum ;  Surgeon: Faisal Mac MD;  Location: [facility] ENDOSCOPY;  Service:    • CORONARY ANGIOPLASTY WITH STENT PLACEMENT     • ENDOSCOPY N/A 7/6/2017    Procedure: ESOPHAGOGASTRODUODENOSCOPY with biopsies;  Surgeon: Faisal Mac MD;  Location: [facility] ENDOSCOPY;  Service:    • HERNIA REPAIR     • PROSTATE RADIOACTIVE SEED IMPLANT     • SKIN BIOPSY     • UPPER GASTROINTESTINAL ENDOSCOPY  11/12/2008    GI bleed, peptic ulcer disease, melena, treated w/heater probe         Family History   Problem Relation Age of Onset   • Heart disease Father          Social History     Socioeconomic History   • Marital status:      Spouse name: Not on file   • Number of children: Not on file   • Years of education: Not on file   • Highest education level: Not on file   Tobacco Use   • Smoking status: Never Smoker   Substance and Sexual Activity   • Alcohol use: No   • Drug use: No         Current Outpatient Medications   Medication Sig Dispense Refill   • Acetaminophen (TYLENOL PO) Take  by  "mouth As Needed.     • amLODIPine (NORVASC) 10 MG tablet Take 10 mg by mouth Daily.     • apixaban (ELIQUIS) 5 MG tablet tablet Take 5 mg by mouth.     • bisoprolol-hydrochlorothiazide (ZIAC) 10-6.25 MG per tablet Take 1 tablet by mouth 2 (Two) Times a Day.     • cilostazol (PLETAL) 100 MG tablet Take 100 mg by mouth 2 (Two) Times a Day.     • isosorbide mononitrate (IMDUR) 60 MG 24 hr tablet Take 60 mg by mouth Daily.     • Multiple Vitamins-Minerals (MULTIVITAMIN ADULT PO) Take  by mouth.     • pravastatin (PRAVACHOL) 40 MG tablet Take 40 mg by mouth Daily.     • simethicone (MYLICON) 125 MG chewable tablet Chew 125 mg.       No current facility-administered medications for this visit.          OBJECTIVE    Pulse 70   Ht 177.8 cm (70\")   Wt 93.7 kg (206 lb 9.6 oz)   SpO2 99%   BMI 29.64 kg/m²       Review of Systems   Constitutional: Negative.    HENT: Positive for hearing loss.    Eyes: Negative.    Respiratory: Negative.    Cardiovascular: Positive for leg swelling.   Gastrointestinal: Positive for blood in stool and constipation.   Endocrine: Negative.    Genitourinary: Negative.    Musculoskeletal: Positive for arthralgias and back pain.        Foot pain, joint pain   Skin: Negative.    Allergic/Immunologic: Negative.    Neurological: Negative.    Hematological: Negative.    Psychiatric/Behavioral: Negative.          Physical Exam     Constitutional: he appears well-developed and well-nourished.   HEENT: Normocephalic. Atraumatic  CV: No tenderness. RRR  Resp: Non-labored respiration. No wheezes.   Psychiatric: he has a normal mood and affect. his   behavior is normal.      Lower Extremity Exam:  Vascular: DP/PT pulses palpable 1+.   Negative hair growth.   No edema  Neuro: Protective sensation diminished to lesser toes, b/l.  DTRs intact  Integument: No open wounds  Left sub-fourth MTPJ hyperkeratosis pre-ulcerative lesion.  Moderate tenderness palpation.  Atrophic skin noted b/l   No masses  Webspaces " c/d/i  Musculoskeletal: LE muscle strength 4/5.   Gait Normal  Semi rigid hammertoe deformity toes 2-5, b/l.  Nails 1-5 b/l thickened  Ankle ROM decreased, b/l  Positive posterior calcaneal exostosis              ASSESSMENT AND PLAN    Diagnoses and all orders for this visit:    1. Metatarsalgia of both feet (Primary)    2. Foot callus    3. Hammer toes of both feet      -Comprehensive foot and ankle exam performed  -Educated pt on diagnosis, etiology and treatment of hammertoe deformity with concurrent plantar keratomas  -Recommend soft, wide toe box accommodative shoe gear.  Metatarsal pads  -Recheck  as needed.          This document has been electronically signed by Gerard Rowley DPM on November 16, 2020 13:56 CST     EMR Dragon/Transcription disclaimer:   Much of this encounter note is an electronic transcription/translation of spoken language to printed text. The electronic translation of spoken language may permit erroneous, or at times, nonsensical words or phrases to be inadvertently transcribed; Although I have reviewed the note for such errors, some may still exist.    Gerard Rowley DPM  11/16/2020  13:56 CST

## 2020-12-01 ENCOUNTER — DOCUMENTATION (OUTPATIENT)
Dept: CARDIOLOGY | Facility: CLINIC | Age: 85
End: 2020-12-01

## 2021-02-03 ENCOUNTER — OFFICE VISIT (OUTPATIENT)
Dept: PODIATRY | Facility: CLINIC | Age: 86
End: 2021-02-03

## 2021-02-03 VITALS — HEART RATE: 88 BPM | OXYGEN SATURATION: 93 % | WEIGHT: 206 LBS | BODY MASS INDEX: 29.49 KG/M2 | HEIGHT: 70 IN

## 2021-02-03 DIAGNOSIS — M77.41 METATARSALGIA OF BOTH FEET: Primary | ICD-10-CM

## 2021-02-03 DIAGNOSIS — M77.42 METATARSALGIA OF BOTH FEET: Primary | ICD-10-CM

## 2021-02-03 DIAGNOSIS — L84 FOOT CALLUS: ICD-10-CM

## 2021-02-03 PROCEDURE — 99212 OFFICE O/P EST SF 10 MIN: CPT | Performed by: PODIATRIST

## 2021-02-03 RX ORDER — ALBUTEROL SULFATE 90 UG/1
AEROSOL, METERED RESPIRATORY (INHALATION)
Status: ON HOLD | COMMUNITY
Start: 2020-11-30 | End: 2021-04-21

## 2021-02-03 NOTE — PROGRESS NOTES
Josefina Stephens Jr.  12/17/1926  89 y.o. male   PCP: Leticia Field APRN: January 2021 ( patient does not know exact date )    Patient presents to office today with a complaint of callous on his left foot.     02/03/2021        Chief Complaint   Patient presents with   • Left Foot - Follow-up, Callouses           History of Present Illness    Josefina Stephens Jr. is a 89 y.o. male  who presents for evaluation of recurrent left forefoot pain.  He feels these are secondary to painful skin lesions in the ball of his foot.      Past Medical History:   Diagnosis Date   • Arthritis    • Atrial fibrillation (CMS/HCC)    • Callus    • Duodenal ulcer     HX   • GERD (gastroesophageal reflux disease)    • GI (gastrointestinal bleed)     HX   • Gout    • Hyperlipidemia    • Hypertension    • Prostate cancer (CMS/HCC)    • Skin cancer    • Sleep apnea    • TIA (transient ischemic attack)          Past Surgical History:   Procedure Laterality Date   • COLONOSCOPY  1955   • COLONOSCOPY N/A 7/6/2017    Procedure: COLONOSCOPY to cecum ;  Surgeon: Faisal Mac MD;  Location: [facility] ENDOSCOPY;  Service:    • CORONARY ANGIOPLASTY WITH STENT PLACEMENT     • ENDOSCOPY N/A 7/6/2017    Procedure: ESOPHAGOGASTRODUODENOSCOPY with biopsies;  Surgeon: Faisal Mac MD;  Location: [facility] ENDOSCOPY;  Service:    • HERNIA REPAIR     • PROSTATE RADIOACTIVE SEED IMPLANT     • SKIN BIOPSY     • UPPER GASTROINTESTINAL ENDOSCOPY  11/12/2008    GI bleed, peptic ulcer disease, melena, treated w/heater probe         Family History   Problem Relation Age of Onset   • Heart disease Father          Social History     Socioeconomic History   • Marital status:      Spouse name: Not on file   • Number of children: Not on file   • Years of education: Not on file   • Highest education level: Not on file   Tobacco Use   • Smoking status: Never Smoker   Substance and Sexual Activity   • Alcohol use: No   • Drug use: No   • Sexual activity:  "Defer         Current Outpatient Medications   Medication Sig Dispense Refill   • Acetaminophen (TYLENOL PO) Take  by mouth As Needed.     • albuterol sulfate  (90 Base) MCG/ACT inhaler INL 2 PFS PO Q 4 H PRN     • amLODIPine (NORVASC) 10 MG tablet Take 10 mg by mouth Daily.     • apixaban (ELIQUIS) 5 MG tablet tablet Take 5 mg by mouth.     • bisoprolol-hydrochlorothiazide (ZIAC) 10-6.25 MG per tablet Take 1 tablet by mouth 2 (Two) Times a Day.     • cilostazol (PLETAL) 100 MG tablet Take 100 mg by mouth 2 (Two) Times a Day.     • isosorbide mononitrate (IMDUR) 60 MG 24 hr tablet Take 60 mg by mouth Daily.     • Multiple Vitamins-Minerals (MULTIVITAMIN ADULT PO) Take  by mouth.     • pravastatin (PRAVACHOL) 40 MG tablet Take 40 mg by mouth Daily.     • simethicone (MYLICON) 125 MG chewable tablet Chew 125 mg.       No current facility-administered medications for this visit.          OBJECTIVE    Pulse 88   Ht 177.8 cm (70\")   Wt 93.4 kg (206 lb)   SpO2 93%   BMI 29.56 kg/m²       Review of Systems   Constitutional: Negative.    HENT: Positive for hearing loss.    Eyes: Negative.    Respiratory: Negative.    Cardiovascular: Positive for leg swelling.   Gastrointestinal: Positive for blood in stool and constipation.   Endocrine: Negative.    Genitourinary: Negative.    Musculoskeletal: Positive for arthralgias and back pain.        Foot pain, joint pain   Skin: Negative.    Allergic/Immunologic: Negative.    Neurological: Negative.    Hematological: Negative.    Psychiatric/Behavioral: Negative.          Physical Exam     Constitutional: he appears well-developed and well-nourished.   HEENT: Normocephalic. Atraumatic  CV: No tenderness. RRR  Resp: Non-labored respiration. No wheezes.   Psychiatric: he has a normal mood and affect. his   behavior is normal.      Lower Extremity Exam:  Vascular: DP/PT pulses palpable 1+.   Negative hair growth.   No edema  Neuro: Protective sensation diminished to lesser " toes, b/l.  DTRs intact  Integument: No open wounds  Left sub-fourth MTPJ hyperkeratosis pre-ulcerative lesion.  Moderate tenderness palpation.  Atrophic skin noted b/l   No masses  Webspaces c/d/i  Musculoskeletal: LE muscle strength 4/5.   Gait Normal  Semi rigid hammertoe deformity toes 2-5, b/l.  Nails 1-5 b/l thickened  Ankle ROM decreased, b/l  Positive posterior calcaneal exostosis              ASSESSMENT AND PLAN    Diagnoses and all orders for this visit:    1. Metatarsalgia of both feet (Primary)    2. Foot callus      -Comprehensive foot and ankle exam performed  -Educated pt on diagnosis, etiology and treatment of hammertoe deformity with concurrent plantar keratomas  -Debridement of above-mentioned hyperkeratosis with 15 blade to epidermis without incident  -Recommend soft, wide toe box accommodative shoe gear.  Metatarsal pads applied  -Recheck  as needed.          This document has been electronically signed by Gerard Rowley DPM on February 3, 2021 10:20 CST     EMR Dragon/Transcription disclaimer:   Much of this encounter note is an electronic transcription/translation of spoken language to printed text. The electronic translation of spoken language may permit erroneous, or at times, nonsensical words or phrases to be inadvertently transcribed; Although I have reviewed the note for such errors, some may still exist.    Gerard Rowley DPM  2/3/2021  10:20 CST

## 2021-02-10 ENCOUNTER — APPOINTMENT (OUTPATIENT)
Dept: CT IMAGING | Facility: HOSPITAL | Age: 86
End: 2021-02-10

## 2021-02-15 DIAGNOSIS — I73.9 PVD (PERIPHERAL VASCULAR DISEASE) (HCC): Primary | ICD-10-CM

## 2021-02-15 DIAGNOSIS — I65.23 CAROTID STENOSIS, ASYMPTOMATIC, BILATERAL: ICD-10-CM

## 2021-02-23 ENCOUNTER — HOSPITAL ENCOUNTER (OUTPATIENT)
Dept: CT IMAGING | Facility: HOSPITAL | Age: 86
Discharge: HOME | End: 2021-02-23
Admitting: NURSE PRACTITIONER

## 2021-02-23 DIAGNOSIS — I71.40 ABDOMINAL AORTIC ANEURYSM WITHOUT RUPTURE (HCC): ICD-10-CM

## 2021-02-23 PROCEDURE — 74176 CT ABD & PELVIS W/O CONTRAST: CPT

## 2021-03-05 ENCOUNTER — TRANSCRIBE ORDERS (OUTPATIENT)
Dept: LAB | Facility: HOSPITAL | Age: 86
End: 2021-03-05

## 2021-03-05 ENCOUNTER — LAB (OUTPATIENT)
Dept: LAB | Facility: HOSPITAL | Age: 86
End: 2021-03-05

## 2021-03-05 ENCOUNTER — HOSPITAL ENCOUNTER (OUTPATIENT)
Dept: ULTRASOUND IMAGING | Facility: HOSPITAL | Age: 86
Discharge: HOME | End: 2021-03-05

## 2021-03-05 DIAGNOSIS — I13.10 MALIGNANT HYPERTENSIVE HEART AND CHRONIC KIDNEY DISEASE STAGE III (HCC): ICD-10-CM

## 2021-03-05 DIAGNOSIS — N18.30 MALIGNANT HYPERTENSIVE HEART AND CHRONIC KIDNEY DISEASE STAGE III (HCC): Primary | ICD-10-CM

## 2021-03-05 DIAGNOSIS — N18.30 MALIGNANT HYPERTENSIVE HEART AND CHRONIC KIDNEY DISEASE STAGE III (HCC): ICD-10-CM

## 2021-03-05 DIAGNOSIS — I13.10 MALIGNANT HYPERTENSIVE HEART AND CHRONIC KIDNEY DISEASE STAGE III (HCC): Primary | ICD-10-CM

## 2021-03-05 DIAGNOSIS — N18.30 STAGE 3 CHRONIC KIDNEY DISEASE, UNSPECIFIED WHETHER STAGE 3A OR 3B CKD (HCC): ICD-10-CM

## 2021-03-05 LAB
ALBUMIN SERPL-MCNC: 4.5 G/DL (ref 3.5–5.2)
ANION GAP SERPL CALCULATED.3IONS-SCNC: 11.6 MMOL/L (ref 5–15)
BASOPHILS # BLD AUTO: 0.03 10*3/MM3 (ref 0–0.2)
BASOPHILS NFR BLD AUTO: 0.6 % (ref 0–1.5)
BUN SERPL-MCNC: 17 MG/DL (ref 8–23)
BUN/CREAT SERPL: 14.7 (ref 7–25)
CALCIUM SPEC-SCNC: 9.6 MG/DL (ref 8.2–9.6)
CHLORIDE SERPL-SCNC: 97 MMOL/L (ref 98–107)
CO2 SERPL-SCNC: 28.4 MMOL/L (ref 22–29)
CREAT SERPL-MCNC: 1.16 MG/DL (ref 0.76–1.27)
CREAT UR-MCNC: 26.6 MG/DL
DEPRECATED RDW RBC AUTO: 53.2 FL (ref 37–54)
EOSINOPHIL # BLD AUTO: 0.28 10*3/MM3 (ref 0–0.4)
EOSINOPHIL NFR BLD AUTO: 5.3 % (ref 0.3–6.2)
ERYTHROCYTE [DISTWIDTH] IN BLOOD BY AUTOMATED COUNT: 14.8 % (ref 12.3–15.4)
GFR SERPL CREATININE-BSD FRML MDRD: 59 ML/MIN/1.73
GLUCOSE SERPL-MCNC: 100 MG/DL (ref 65–99)
HCT VFR BLD AUTO: 43.5 % (ref 37.5–51)
HGB BLD-MCNC: 14.7 G/DL (ref 13–17.7)
IMM GRANULOCYTES # BLD AUTO: 0.01 10*3/MM3 (ref 0–0.05)
IMM GRANULOCYTES NFR BLD AUTO: 0.2 % (ref 0–0.5)
LYMPHOCYTES # BLD AUTO: 1.03 10*3/MM3 (ref 0.7–3.1)
LYMPHOCYTES NFR BLD AUTO: 19.7 % (ref 19.6–45.3)
MCH RBC QN AUTO: 32.7 PG (ref 26.6–33)
MCHC RBC AUTO-ENTMCNC: 33.8 G/DL (ref 31.5–35.7)
MCV RBC AUTO: 96.9 FL (ref 79–97)
MONOCYTES # BLD AUTO: 0.46 10*3/MM3 (ref 0.1–0.9)
MONOCYTES NFR BLD AUTO: 8.8 % (ref 5–12)
NEUTROPHILS NFR BLD AUTO: 3.43 10*3/MM3 (ref 1.7–7)
NEUTROPHILS NFR BLD AUTO: 65.4 % (ref 42.7–76)
NRBC BLD AUTO-RTO: 0 /100 WBC (ref 0–0.2)
PHOSPHATE SERPL-MCNC: 3.5 MG/DL (ref 2.5–4.5)
PLATELET # BLD AUTO: 244 10*3/MM3 (ref 140–450)
PMV BLD AUTO: 9.1 FL (ref 6–12)
POTASSIUM SERPL-SCNC: 4 MMOL/L (ref 3.5–5.2)
PROT UR-MCNC: 27 MG/DL
PROT/CREAT UR: 1015 MG/G CREA (ref 0–200)
PTH-INTACT SERPL-MCNC: 30.5 PG/ML (ref 15–65)
RBC # BLD AUTO: 4.49 10*6/MM3 (ref 4.14–5.8)
SODIUM SERPL-SCNC: 137 MMOL/L (ref 136–145)
WBC # BLD AUTO: 5.24 10*3/MM3 (ref 3.4–10.8)

## 2021-03-05 PROCEDURE — 84156 ASSAY OF PROTEIN URINE: CPT

## 2021-03-05 PROCEDURE — 76775 US EXAM ABDO BACK WALL LIM: CPT

## 2021-03-05 PROCEDURE — 36415 COLL VENOUS BLD VENIPUNCTURE: CPT

## 2021-03-05 PROCEDURE — 83970 ASSAY OF PARATHORMONE: CPT

## 2021-03-05 PROCEDURE — 80069 RENAL FUNCTION PANEL: CPT

## 2021-03-05 PROCEDURE — 85025 COMPLETE CBC W/AUTO DIFF WBC: CPT

## 2021-03-05 PROCEDURE — 82570 ASSAY OF URINE CREATININE: CPT

## 2021-03-22 ENCOUNTER — OFFICE VISIT (OUTPATIENT)
Dept: CARDIAC SURGERY | Facility: CLINIC | Age: 86
End: 2021-03-22

## 2021-03-22 VITALS
OXYGEN SATURATION: 98 % | HEIGHT: 71 IN | HEART RATE: 66 BPM | BODY MASS INDEX: 27.16 KG/M2 | WEIGHT: 194 LBS | TEMPERATURE: 98.1 F | DIASTOLIC BLOOD PRESSURE: 88 MMHG | SYSTOLIC BLOOD PRESSURE: 154 MMHG

## 2021-03-22 DIAGNOSIS — I48.0 PAROXYSMAL ATRIAL FIBRILLATION (HCC): Chronic | ICD-10-CM

## 2021-03-22 DIAGNOSIS — E78.2 MIXED HYPERLIPIDEMIA: ICD-10-CM

## 2021-03-22 DIAGNOSIS — I65.23 BILATERAL CAROTID ARTERY STENOSIS: ICD-10-CM

## 2021-03-22 DIAGNOSIS — I10 ESSENTIAL HYPERTENSION: ICD-10-CM

## 2021-03-22 DIAGNOSIS — Z99.89 OSA ON CPAP: ICD-10-CM

## 2021-03-22 DIAGNOSIS — E66.3 OVERWEIGHT WITH BODY MASS INDEX (BMI) OF 26 TO 26.9 IN ADULT: ICD-10-CM

## 2021-03-22 DIAGNOSIS — G45.9 TIA (TRANSIENT ISCHEMIC ATTACK): ICD-10-CM

## 2021-03-22 DIAGNOSIS — G47.33 OSA ON CPAP: ICD-10-CM

## 2021-03-22 DIAGNOSIS — I25.10 CORONARY ARTERY DISEASE INVOLVING NATIVE CORONARY ARTERY OF NATIVE HEART WITHOUT ANGINA PECTORIS: ICD-10-CM

## 2021-03-22 DIAGNOSIS — I73.9 PVD (PERIPHERAL VASCULAR DISEASE) (HCC): Primary | ICD-10-CM

## 2021-03-22 PROCEDURE — 99204 OFFICE O/P NEW MOD 45 MIN: CPT | Performed by: THORACIC SURGERY (CARDIOTHORACIC VASCULAR SURGERY)

## 2021-04-21 ENCOUNTER — HOSPITAL ENCOUNTER (INPATIENT)
Facility: HOSPITAL | Age: 86
LOS: 9 days | Discharge: HOME HEALTH | End: 2021-05-01
Attending: EMERGENCY MEDICINE | Admitting: INTERNAL MEDICINE

## 2021-04-21 ENCOUNTER — APPOINTMENT (OUTPATIENT)
Dept: ULTRASOUND IMAGING | Facility: HOSPITAL | Age: 86
End: 2021-04-21

## 2021-04-21 ENCOUNTER — APPOINTMENT (OUTPATIENT)
Dept: GENERAL RADIOLOGY | Facility: HOSPITAL | Age: 86
End: 2021-04-21

## 2021-04-21 ENCOUNTER — APPOINTMENT (OUTPATIENT)
Dept: CT IMAGING | Facility: HOSPITAL | Age: 86
End: 2021-04-21

## 2021-04-21 ENCOUNTER — APPOINTMENT (OUTPATIENT)
Dept: INTERVENTIONAL RADIOLOGY/VASCULAR | Facility: HOSPITAL | Age: 86
End: 2021-04-21

## 2021-04-21 DIAGNOSIS — Z74.09 IMPAIRED MOBILITY AND ACTIVITIES OF DAILY LIVING: ICD-10-CM

## 2021-04-21 DIAGNOSIS — R55 SYNCOPE AND COLLAPSE: Primary | ICD-10-CM

## 2021-04-21 DIAGNOSIS — N17.9 AKI (ACUTE KIDNEY INJURY) (HCC): ICD-10-CM

## 2021-04-21 DIAGNOSIS — Z78.9 IMPAIRED MOBILITY AND ACTIVITIES OF DAILY LIVING: ICD-10-CM

## 2021-04-21 DIAGNOSIS — Z74.09 IMPAIRED FUNCTIONAL MOBILITY, BALANCE, GAIT, AND ENDURANCE: ICD-10-CM

## 2021-04-21 DIAGNOSIS — T14.8XXA HEMATOMA AND CONTUSION: ICD-10-CM

## 2021-04-21 PROBLEM — S80.12XA HEMATOMA OF LEFT LOWER EXTREMITY: Status: ACTIVE | Noted: 2021-04-21

## 2021-04-21 PROBLEM — R57.8 HEMORRHAGIC SHOCK: Status: ACTIVE | Noted: 2021-04-21

## 2021-04-21 PROBLEM — A41.9 SEPSIS (HCC): Status: ACTIVE | Noted: 2021-04-21

## 2021-04-21 LAB
ABO GROUP BLD: NORMAL
ABO GROUP BLD: NORMAL
ALBUMIN SERPL-MCNC: 3.4 G/DL (ref 3.5–5.2)
ALBUMIN/GLOB SERPL: 1.3 G/DL
ALP SERPL-CCNC: 38 U/L (ref 39–117)
ALT SERPL W P-5'-P-CCNC: 14 U/L (ref 1–41)
ANION GAP SERPL CALCULATED.3IONS-SCNC: 14 MMOL/L (ref 5–15)
APTT PPP: 26.1 SECONDS (ref 20–40.3)
AST SERPL-CCNC: 18 U/L (ref 1–40)
BASOPHILS # BLD AUTO: 0.01 10*3/MM3 (ref 0–0.2)
BASOPHILS NFR BLD AUTO: 0.1 % (ref 0–1.5)
BILIRUB SERPL-MCNC: 0.7 MG/DL (ref 0–1.2)
BILIRUB UR QL STRIP: NEGATIVE
BLD GP AB SCN SERPL QL: NEGATIVE
BUN SERPL-MCNC: 27 MG/DL (ref 8–23)
BUN/CREAT SERPL: 16.5 (ref 7–25)
CALCIUM SPEC-SCNC: 8.6 MG/DL (ref 8.2–9.6)
CHLORIDE SERPL-SCNC: 98 MMOL/L (ref 98–107)
CK SERPL-CCNC: 48 U/L (ref 20–200)
CLARITY UR: CLEAR
CO2 SERPL-SCNC: 22 MMOL/L (ref 22–29)
COLOR UR: YELLOW
CREAT SERPL-MCNC: 1.64 MG/DL (ref 0.76–1.27)
D-LACTATE SERPL-SCNC: 3.7 MMOL/L (ref 0.5–2)
D-LACTATE SERPL-SCNC: 5.6 MMOL/L (ref 0.5–2)
DEPRECATED RDW RBC AUTO: 53.7 FL (ref 37–54)
EOSINOPHIL # BLD AUTO: 0 10*3/MM3 (ref 0–0.4)
EOSINOPHIL NFR BLD AUTO: 0 % (ref 0.3–6.2)
ERYTHROCYTE [DISTWIDTH] IN BLOOD BY AUTOMATED COUNT: 15.1 % (ref 12.3–15.4)
FLUAV RNA RESP QL NAA+PROBE: NOT DETECTED
FLUBV RNA RESP QL NAA+PROBE: NOT DETECTED
GFR SERPL CREATININE-BSD FRML MDRD: 39 ML/MIN/1.73
GLOBULIN UR ELPH-MCNC: 2.7 GM/DL
GLUCOSE BLDC GLUCOMTR-MCNC: 106 MG/DL (ref 70–130)
GLUCOSE SERPL-MCNC: 232 MG/DL (ref 65–99)
GLUCOSE UR STRIP-MCNC: NEGATIVE MG/DL
HCT VFR BLD AUTO: 25.8 % (ref 37.5–51)
HCT VFR BLD AUTO: 31.8 % (ref 37.5–51)
HGB BLD-MCNC: 11 G/DL (ref 13–17.7)
HGB BLD-MCNC: 8.7 G/DL (ref 13–17.7)
HGB UR QL STRIP.AUTO: NEGATIVE
HOLD SPECIMEN: NORMAL
IMM GRANULOCYTES # BLD AUTO: 0.03 10*3/MM3 (ref 0–0.05)
IMM GRANULOCYTES NFR BLD AUTO: 0.4 % (ref 0–0.5)
INR PPP: 1.7 (ref 0.8–1.2)
KETONES UR QL STRIP: ABNORMAL
LEUKOCYTE ESTERASE UR QL STRIP.AUTO: NEGATIVE
LYMPHOCYTES # BLD AUTO: 0.93 10*3/MM3 (ref 0.7–3.1)
LYMPHOCYTES NFR BLD AUTO: 11 % (ref 19.6–45.3)
Lab: NORMAL
MAGNESIUM SERPL-MCNC: 1.8 MG/DL (ref 1.7–2.3)
MCH RBC QN AUTO: 33.2 PG (ref 26.6–33)
MCHC RBC AUTO-ENTMCNC: 34.6 G/DL (ref 31.5–35.7)
MCV RBC AUTO: 96.1 FL (ref 79–97)
MONOCYTES # BLD AUTO: 0.41 10*3/MM3 (ref 0.1–0.9)
MONOCYTES NFR BLD AUTO: 4.9 % (ref 5–12)
NEUTROPHILS NFR BLD AUTO: 7.07 10*3/MM3 (ref 1.7–7)
NEUTROPHILS NFR BLD AUTO: 83.6 % (ref 42.7–76)
NITRITE UR QL STRIP: NEGATIVE
NRBC BLD AUTO-RTO: 0 /100 WBC (ref 0–0.2)
NT-PROBNP SERPL-MCNC: 1380 PG/ML (ref 0–1800)
PH UR STRIP.AUTO: 7 [PH] (ref 5–9)
PLATELET # BLD AUTO: 257 10*3/MM3 (ref 140–450)
PMV BLD AUTO: 9.8 FL (ref 6–12)
POTASSIUM SERPL-SCNC: 4.7 MMOL/L (ref 3.5–5.2)
PROT SERPL-MCNC: 6.1 G/DL (ref 6–8.5)
PROT UR QL STRIP: NEGATIVE
PROTHROMBIN TIME: 20.9 SECONDS (ref 11.1–15.3)
RBC # BLD AUTO: 3.31 10*6/MM3 (ref 4.14–5.8)
RH BLD: POSITIVE
RH BLD: POSITIVE
SARS-COV-2 RNA RESP QL NAA+PROBE: NOT DETECTED
SODIUM SERPL-SCNC: 134 MMOL/L (ref 136–145)
SP GR UR STRIP: 1.01 (ref 1–1.03)
T&S EXPIRATION DATE: NORMAL
TROPONIN T SERPL-MCNC: <0.01 NG/ML (ref 0–0.03)
TROPONIN T SERPL-MCNC: <0.01 NG/ML (ref 0–0.03)
UROBILINOGEN UR QL STRIP: ABNORMAL
WBC # BLD AUTO: 8.45 10*3/MM3 (ref 3.4–10.8)
WHOLE BLOOD HOLD SPECIMEN: NORMAL

## 2021-04-21 PROCEDURE — G0378 HOSPITAL OBSERVATION PER HR: HCPCS

## 2021-04-21 PROCEDURE — 73502 X-RAY EXAM HIP UNI 2-3 VIEWS: CPT

## 2021-04-21 PROCEDURE — 73552 X-RAY EXAM OF FEMUR 2/>: CPT

## 2021-04-21 PROCEDURE — 86923 COMPATIBILITY TEST ELECTRIC: CPT

## 2021-04-21 PROCEDURE — 82550 ASSAY OF CK (CPK): CPT | Performed by: EMERGENCY MEDICINE

## 2021-04-21 PROCEDURE — 86850 RBC ANTIBODY SCREEN: CPT | Performed by: EMERGENCY MEDICINE

## 2021-04-21 PROCEDURE — 20610 DRAIN/INJ JOINT/BURSA W/O US: CPT | Performed by: ORTHOPAEDIC SURGERY

## 2021-04-21 PROCEDURE — 83880 ASSAY OF NATRIURETIC PEPTIDE: CPT | Performed by: EMERGENCY MEDICINE

## 2021-04-21 PROCEDURE — 73700 CT LOWER EXTREMITY W/O DYE: CPT

## 2021-04-21 PROCEDURE — 85730 THROMBOPLASTIN TIME PARTIAL: CPT | Performed by: EMERGENCY MEDICINE

## 2021-04-21 PROCEDURE — 85014 HEMATOCRIT: CPT | Performed by: INTERNAL MEDICINE

## 2021-04-21 PROCEDURE — 73564 X-RAY EXAM KNEE 4 OR MORE: CPT

## 2021-04-21 PROCEDURE — 25010000002 VANCOMYCIN: Performed by: EMERGENCY MEDICINE

## 2021-04-21 PROCEDURE — 93005 ELECTROCARDIOGRAM TRACING: CPT | Performed by: EMERGENCY MEDICINE

## 2021-04-21 PROCEDURE — 70450 CT HEAD/BRAIN W/O DYE: CPT

## 2021-04-21 PROCEDURE — 93971 EXTREMITY STUDY: CPT

## 2021-04-21 PROCEDURE — 36410 VNPNXR 3YR/> PHY/QHP DX/THER: CPT

## 2021-04-21 PROCEDURE — 81003 URINALYSIS AUTO W/O SCOPE: CPT | Performed by: EMERGENCY MEDICINE

## 2021-04-21 PROCEDURE — 86901 BLOOD TYPING SEROLOGIC RH(D): CPT | Performed by: EMERGENCY MEDICINE

## 2021-04-21 PROCEDURE — 93926 LOWER EXTREMITY STUDY: CPT

## 2021-04-21 PROCEDURE — 71045 X-RAY EXAM CHEST 1 VIEW: CPT

## 2021-04-21 PROCEDURE — C1751 CATH, INF, PER/CENT/MIDLINE: HCPCS

## 2021-04-21 PROCEDURE — 85610 PROTHROMBIN TIME: CPT | Performed by: EMERGENCY MEDICINE

## 2021-04-21 PROCEDURE — 36430 TRANSFUSION BLD/BLD COMPNT: CPT

## 2021-04-21 PROCEDURE — 80053 COMPREHEN METABOLIC PANEL: CPT | Performed by: EMERGENCY MEDICINE

## 2021-04-21 PROCEDURE — 36415 COLL VENOUS BLD VENIPUNCTURE: CPT | Performed by: INTERNAL MEDICINE

## 2021-04-21 PROCEDURE — 73590 X-RAY EXAM OF LOWER LEG: CPT

## 2021-04-21 PROCEDURE — 87040 BLOOD CULTURE FOR BACTERIA: CPT | Performed by: EMERGENCY MEDICINE

## 2021-04-21 PROCEDURE — 83735 ASSAY OF MAGNESIUM: CPT | Performed by: EMERGENCY MEDICINE

## 2021-04-21 PROCEDURE — 93005 ELECTROCARDIOGRAM TRACING: CPT

## 2021-04-21 PROCEDURE — 84484 ASSAY OF TROPONIN QUANT: CPT | Performed by: EMERGENCY MEDICINE

## 2021-04-21 PROCEDURE — 82962 GLUCOSE BLOOD TEST: CPT

## 2021-04-21 PROCEDURE — 93010 ELECTROCARDIOGRAM REPORT: CPT | Performed by: INTERNAL MEDICINE

## 2021-04-21 PROCEDURE — P9016 RBC LEUKOCYTES REDUCED: HCPCS

## 2021-04-21 PROCEDURE — 83605 ASSAY OF LACTIC ACID: CPT | Performed by: EMERGENCY MEDICINE

## 2021-04-21 PROCEDURE — 86900 BLOOD TYPING SEROLOGIC ABO: CPT

## 2021-04-21 PROCEDURE — 86901 BLOOD TYPING SEROLOGIC RH(D): CPT

## 2021-04-21 PROCEDURE — 86900 BLOOD TYPING SEROLOGIC ABO: CPT | Performed by: EMERGENCY MEDICINE

## 2021-04-21 PROCEDURE — 76937 US GUIDE VASCULAR ACCESS: CPT

## 2021-04-21 PROCEDURE — 99221 1ST HOSP IP/OBS SF/LOW 40: CPT | Performed by: ORTHOPAEDIC SURGERY

## 2021-04-21 PROCEDURE — 99285 EMERGENCY DEPT VISIT HI MDM: CPT

## 2021-04-21 PROCEDURE — 87636 SARSCOV2 & INF A&B AMP PRB: CPT | Performed by: EMERGENCY MEDICINE

## 2021-04-21 PROCEDURE — 85025 COMPLETE CBC W/AUTO DIFF WBC: CPT | Performed by: EMERGENCY MEDICINE

## 2021-04-21 PROCEDURE — 85018 HEMOGLOBIN: CPT | Performed by: INTERNAL MEDICINE

## 2021-04-21 RX ORDER — MORPHINE SULFATE 2 MG/ML
1 INJECTION, SOLUTION INTRAMUSCULAR; INTRAVENOUS EVERY 4 HOURS PRN
Status: ACTIVE | OUTPATIENT
Start: 2021-04-21 | End: 2021-04-28

## 2021-04-21 RX ORDER — ACETAMINOPHEN 325 MG/1
650 TABLET ORAL EVERY 4 HOURS PRN
Status: DISCONTINUED | OUTPATIENT
Start: 2021-04-21 | End: 2021-05-01 | Stop reason: HOSPADM

## 2021-04-21 RX ORDER — SODIUM CHLORIDE 0.9 % (FLUSH) 0.9 %
10 SYRINGE (ML) INJECTION AS NEEDED
Status: DISCONTINUED | OUTPATIENT
Start: 2021-04-21 | End: 2021-05-01 | Stop reason: HOSPADM

## 2021-04-21 RX ORDER — SODIUM CHLORIDE 0.9 % (FLUSH) 0.9 %
10 SYRINGE (ML) INJECTION EVERY 12 HOURS SCHEDULED
Status: DISCONTINUED | OUTPATIENT
Start: 2021-04-21 | End: 2021-05-01 | Stop reason: HOSPADM

## 2021-04-21 RX ORDER — NALOXONE HCL 0.4 MG/ML
0.4 VIAL (ML) INJECTION
Status: DISCONTINUED | OUTPATIENT
Start: 2021-04-21 | End: 2021-05-01 | Stop reason: HOSPADM

## 2021-04-21 RX ORDER — SODIUM CHLORIDE 9 MG/ML
75 INJECTION, SOLUTION INTRAVENOUS CONTINUOUS
Status: DISCONTINUED | OUTPATIENT
Start: 2021-04-21 | End: 2021-04-22

## 2021-04-21 RX ORDER — PRAVASTATIN SODIUM 40 MG
40 TABLET ORAL DAILY
Status: DISCONTINUED | OUTPATIENT
Start: 2021-04-22 | End: 2021-05-01 | Stop reason: HOSPADM

## 2021-04-21 RX ORDER — SIMETHICONE 125 MG
125 TABLET,CHEWABLE ORAL
Status: DISCONTINUED | OUTPATIENT
Start: 2021-04-21 | End: 2021-05-01 | Stop reason: HOSPADM

## 2021-04-21 RX ORDER — ONDANSETRON 2 MG/ML
4 INJECTION INTRAMUSCULAR; INTRAVENOUS EVERY 6 HOURS PRN
Status: DISCONTINUED | OUTPATIENT
Start: 2021-04-21 | End: 2021-05-01 | Stop reason: HOSPADM

## 2021-04-21 RX ORDER — SODIUM CHLORIDE 9 MG/ML
INJECTION, SOLUTION INTRAVENOUS
Status: DISCONTINUED
Start: 2021-04-21 | End: 2021-05-01 | Stop reason: HOSPADM

## 2021-04-21 RX ORDER — SODIUM CHLORIDE 9 MG/ML
125 INJECTION, SOLUTION INTRAVENOUS CONTINUOUS
Status: DISCONTINUED | OUTPATIENT
Start: 2021-04-21 | End: 2021-04-21

## 2021-04-21 RX ORDER — IPRATROPIUM BROMIDE AND ALBUTEROL SULFATE 2.5; .5 MG/3ML; MG/3ML
3 SOLUTION RESPIRATORY (INHALATION) EVERY 6 HOURS PRN
Status: DISCONTINUED | OUTPATIENT
Start: 2021-04-21 | End: 2021-05-01 | Stop reason: HOSPADM

## 2021-04-21 RX ORDER — MULTIPLE VITAMINS W/ MINERALS TAB 9MG-400MCG
1 TAB ORAL DAILY
Status: DISCONTINUED | OUTPATIENT
Start: 2021-04-22 | End: 2021-05-01 | Stop reason: HOSPADM

## 2021-04-21 RX ORDER — ISOSORBIDE MONONITRATE 60 MG/1
60 TABLET, EXTENDED RELEASE ORAL DAILY
Status: DISCONTINUED | OUTPATIENT
Start: 2021-04-22 | End: 2021-05-01 | Stop reason: HOSPADM

## 2021-04-21 RX ADMIN — SIMETHICONE 125 MG: 125 TABLET, CHEWABLE ORAL at 20:55

## 2021-04-21 RX ADMIN — SODIUM CHLORIDE 125 ML/HR: 9 INJECTION, SOLUTION INTRAVENOUS at 15:29

## 2021-04-21 RX ADMIN — AZTREONAM 2 G: 2 INJECTION, POWDER, FOR SOLUTION INTRAMUSCULAR; INTRAVENOUS at 12:27

## 2021-04-21 RX ADMIN — VANCOMYCIN HYDROCHLORIDE 1750 MG: 1 INJECTION, POWDER, LYOPHILIZED, FOR SOLUTION INTRAVENOUS at 12:51

## 2021-04-21 RX ADMIN — SODIUM CHLORIDE 2640 ML: 900 INJECTION, SOLUTION INTRAVENOUS at 10:30

## 2021-04-21 RX ADMIN — SIMETHICONE 125 MG: 125 TABLET, CHEWABLE ORAL at 18:29

## 2021-04-21 RX ADMIN — SODIUM CHLORIDE 125 ML/HR: 9 INJECTION, SOLUTION INTRAVENOUS at 18:29

## 2021-04-21 RX ADMIN — SODIUM CHLORIDE, PRESERVATIVE FREE 10 ML: 5 INJECTION INTRAVENOUS at 20:55

## 2021-04-22 ENCOUNTER — APPOINTMENT (OUTPATIENT)
Dept: CARDIOLOGY | Facility: HOSPITAL | Age: 86
End: 2021-04-22

## 2021-04-22 PROBLEM — R55 SYNCOPE AND COLLAPSE: Status: ACTIVE | Noted: 2021-04-22

## 2021-04-22 PROBLEM — I48.91 ATRIAL FIBRILLATION: Chronic | Status: ACTIVE | Noted: 2017-11-01

## 2021-04-22 PROBLEM — R57.8 HEMORRHAGIC SHOCK: Chronic | Status: ACTIVE | Noted: 2021-04-21

## 2021-04-22 LAB
ANION GAP SERPL CALCULATED.3IONS-SCNC: 7 MMOL/L (ref 5–15)
BASOPHILS # BLD AUTO: 0.01 10*3/MM3 (ref 0–0.2)
BASOPHILS NFR BLD AUTO: 0.1 % (ref 0–1.5)
BH CV ECHO MEAS - ACS: 1.3 CM
BH CV ECHO MEAS - AI DEC SLOPE: 299.8 CM/SEC^2
BH CV ECHO MEAS - AI MAX PG: 89.9 MMHG
BH CV ECHO MEAS - AI MAX VEL: 474 CM/SEC
BH CV ECHO MEAS - AI P1/2T: 463.2 MSEC
BH CV ECHO MEAS - AO MAX PG (FULL): 13.7 MMHG
BH CV ECHO MEAS - AO MAX PG: 17.1 MMHG
BH CV ECHO MEAS - AO MEAN PG (FULL): 8 MMHG
BH CV ECHO MEAS - AO MEAN PG: 9.8 MMHG
BH CV ECHO MEAS - AO ROOT AREA (BSA CORRECTED): 2
BH CV ECHO MEAS - AO ROOT AREA: 13.9 CM^2
BH CV ECHO MEAS - AO ROOT DIAM: 4.2 CM
BH CV ECHO MEAS - AO V2 MAX: 206.5 CM/SEC
BH CV ECHO MEAS - AO V2 MEAN: 140 CM/SEC
BH CV ECHO MEAS - AO V2 VTI: 32.4 CM
BH CV ECHO MEAS - ASC AORTA: 3.9 CM
BH CV ECHO MEAS - AVA(I,A): 1.6 CM^2
BH CV ECHO MEAS - AVA(I,D): 1.6 CM^2
BH CV ECHO MEAS - AVA(V,A): 1.6 CM^2
BH CV ECHO MEAS - AVA(V,D): 1.6 CM^2
BH CV ECHO MEAS - BSA(HAYCOCK): 2.2 M^2
BH CV ECHO MEAS - BSA: 2.1 M^2
BH CV ECHO MEAS - BZI_BMI: 28 KILOGRAMS/M^2
BH CV ECHO MEAS - BZI_METRIC_HEIGHT: 180.3 CM
BH CV ECHO MEAS - BZI_METRIC_WEIGHT: 91.2 KG
BH CV ECHO MEAS - EDV(CUBED): 67.9 ML
BH CV ECHO MEAS - EDV(MOD-SP2): 48.4 ML
BH CV ECHO MEAS - EDV(MOD-SP4): 53.3 ML
BH CV ECHO MEAS - EDV(TEICH): 73.4 ML
BH CV ECHO MEAS - EF(CUBED): 71 %
BH CV ECHO MEAS - EF(MOD-SP2): 53.7 %
BH CV ECHO MEAS - EF(MOD-SP4): 61.7 %
BH CV ECHO MEAS - EF(TEICH): 63.2 %
BH CV ECHO MEAS - ESV(CUBED): 19.7 ML
BH CV ECHO MEAS - ESV(MOD-SP2): 22.4 ML
BH CV ECHO MEAS - ESV(MOD-SP4): 20.4 ML
BH CV ECHO MEAS - ESV(TEICH): 27 ML
BH CV ECHO MEAS - FS: 33.8 %
BH CV ECHO MEAS - IVS/LVPW: 1
BH CV ECHO MEAS - IVSD: 1.2 CM
BH CV ECHO MEAS - LA DIMENSION: 4.7 CM
BH CV ECHO MEAS - LA/AO: 1.1
BH CV ECHO MEAS - LV DIASTOLIC VOL/BSA (35-75): 25.2 ML/M^2
BH CV ECHO MEAS - LV MASS(C)D: 168.4 GRAMS
BH CV ECHO MEAS - LV MASS(C)DI: 79.7 GRAMS/M^2
BH CV ECHO MEAS - LV MAX PG: 3.4 MMHG
BH CV ECHO MEAS - LV MEAN PG: 1.8 MMHG
BH CV ECHO MEAS - LV SYSTOLIC VOL/BSA (12-30): 9.7 ML/M^2
BH CV ECHO MEAS - LV V1 MAX: 92.5 CM/SEC
BH CV ECHO MEAS - LV V1 MEAN: 62 CM/SEC
BH CV ECHO MEAS - LV V1 VTI: 14.8 CM
BH CV ECHO MEAS - LVIDD: 4.1 CM
BH CV ECHO MEAS - LVIDS: 2.7 CM
BH CV ECHO MEAS - LVLD AP2: 8 CM
BH CV ECHO MEAS - LVLD AP4: 8.1 CM
BH CV ECHO MEAS - LVLS AP2: 6.5 CM
BH CV ECHO MEAS - LVLS AP4: 6.8 CM
BH CV ECHO MEAS - LVOT AREA (M): 3.5 CM^2
BH CV ECHO MEAS - LVOT AREA: 3.5 CM^2
BH CV ECHO MEAS - LVOT DIAM: 2.1 CM
BH CV ECHO MEAS - LVPWD: 1.2 CM
BH CV ECHO MEAS - MR MAX PG: 79.9 MMHG
BH CV ECHO MEAS - MR MAX VEL: 447 CM/SEC
BH CV ECHO MEAS - MV DEC SLOPE: 869 CM/SEC^2
BH CV ECHO MEAS - MV MAX PG: 7.8 MMHG
BH CV ECHO MEAS - MV MEAN PG: 3 MMHG
BH CV ECHO MEAS - MV P1/2T MAX VEL: 138 CM/SEC
BH CV ECHO MEAS - MV P1/2T: 46.5 MSEC
BH CV ECHO MEAS - MV V2 MAX: 140 CM/SEC
BH CV ECHO MEAS - MV V2 MEAN: 70.3 CM/SEC
BH CV ECHO MEAS - MV V2 VTI: 19.3 CM
BH CV ECHO MEAS - MVA P1/2T LCG: 1.6 CM^2
BH CV ECHO MEAS - MVA(P1/2T): 4.7 CM^2
BH CV ECHO MEAS - MVA(VTI): 2.7 CM^2
BH CV ECHO MEAS - PA MAX PG: 4.2 MMHG
BH CV ECHO MEAS - PA V2 MAX: 101 CM/SEC
BH CV ECHO MEAS - RAP SYSTOLE: 5 MMHG
BH CV ECHO MEAS - RVDD: 2.2 CM
BH CV ECHO MEAS - RVSP: 33.4 MMHG
BH CV ECHO MEAS - SI(AO): 212.1 ML/M^2
BH CV ECHO MEAS - SI(CUBED): 22.8 ML/M^2
BH CV ECHO MEAS - SI(LVOT): 24.2 ML/M^2
BH CV ECHO MEAS - SI(MOD-SP2): 12.3 ML/M^2
BH CV ECHO MEAS - SI(MOD-SP4): 15.6 ML/M^2
BH CV ECHO MEAS - SI(TEICH): 21.9 ML/M^2
BH CV ECHO MEAS - SV(AO): 448.2 ML
BH CV ECHO MEAS - SV(CUBED): 48.2 ML
BH CV ECHO MEAS - SV(LVOT): 51.2 ML
BH CV ECHO MEAS - SV(MOD-SP2): 26 ML
BH CV ECHO MEAS - SV(MOD-SP4): 32.9 ML
BH CV ECHO MEAS - SV(TEICH): 46.4 ML
BH CV ECHO MEAS - TR MAX VEL: 266.2 CM/SEC
BUN SERPL-MCNC: 26 MG/DL (ref 8–23)
BUN/CREAT SERPL: 21.3 (ref 7–25)
CALCIUM SPEC-SCNC: 7.9 MG/DL (ref 8.2–9.6)
CHLORIDE SERPL-SCNC: 109 MMOL/L (ref 98–107)
CO2 SERPL-SCNC: 23 MMOL/L (ref 22–29)
CREAT SERPL-MCNC: 1.22 MG/DL (ref 0.76–1.27)
DEPRECATED RDW RBC AUTO: 52.4 FL (ref 37–54)
EOSINOPHIL # BLD AUTO: 0.01 10*3/MM3 (ref 0–0.4)
EOSINOPHIL NFR BLD AUTO: 0.1 % (ref 0.3–6.2)
ERYTHROCYTE [DISTWIDTH] IN BLOOD BY AUTOMATED COUNT: 15.9 % (ref 12.3–15.4)
GFR SERPL CREATININE-BSD FRML MDRD: 55 ML/MIN/1.73
GLUCOSE SERPL-MCNC: 136 MG/DL (ref 65–99)
HCT VFR BLD AUTO: 27.1 % (ref 37.5–51)
HGB BLD-MCNC: 9.6 G/DL (ref 13–17.7)
IMM GRANULOCYTES # BLD AUTO: 0.04 10*3/MM3 (ref 0–0.05)
IMM GRANULOCYTES NFR BLD AUTO: 0.5 % (ref 0–0.5)
LV EF 2D ECHO EST: 61 %
LYMPHOCYTES # BLD AUTO: 1.13 10*3/MM3 (ref 0.7–3.1)
LYMPHOCYTES NFR BLD AUTO: 13.4 % (ref 19.6–45.3)
MAXIMAL PREDICTED HEART RATE: 126 BPM
MCH RBC QN AUTO: 32.4 PG (ref 26.6–33)
MCHC RBC AUTO-ENTMCNC: 35.4 G/DL (ref 31.5–35.7)
MCV RBC AUTO: 91.6 FL (ref 79–97)
MONOCYTES # BLD AUTO: 0.79 10*3/MM3 (ref 0.1–0.9)
MONOCYTES NFR BLD AUTO: 9.4 % (ref 5–12)
NEUTROPHILS NFR BLD AUTO: 6.46 10*3/MM3 (ref 1.7–7)
NEUTROPHILS NFR BLD AUTO: 76.5 % (ref 42.7–76)
NRBC BLD AUTO-RTO: 0 /100 WBC (ref 0–0.2)
PLATELET # BLD AUTO: 174 10*3/MM3 (ref 140–450)
PMV BLD AUTO: 9.8 FL (ref 6–12)
POTASSIUM SERPL-SCNC: 4.1 MMOL/L (ref 3.5–5.2)
QT INTERVAL: 322 MS
QTC INTERVAL: 467 MS
RBC # BLD AUTO: 2.96 10*6/MM3 (ref 4.14–5.8)
SODIUM SERPL-SCNC: 139 MMOL/L (ref 136–145)
STRESS TARGET HR: 107 BPM
WBC # BLD AUTO: 8.44 10*3/MM3 (ref 3.4–10.8)

## 2021-04-22 PROCEDURE — 93306 TTE W/DOPPLER COMPLETE: CPT | Performed by: INTERNAL MEDICINE

## 2021-04-22 PROCEDURE — 85025 COMPLETE CBC W/AUTO DIFF WBC: CPT | Performed by: INTERNAL MEDICINE

## 2021-04-22 PROCEDURE — 80048 BASIC METABOLIC PNL TOTAL CA: CPT | Performed by: INTERNAL MEDICINE

## 2021-04-22 PROCEDURE — 93306 TTE W/DOPPLER COMPLETE: CPT

## 2021-04-22 PROCEDURE — 99222 1ST HOSP IP/OBS MODERATE 55: CPT | Performed by: INTERNAL MEDICINE

## 2021-04-22 PROCEDURE — 99231 SBSQ HOSP IP/OBS SF/LOW 25: CPT | Performed by: ORTHOPAEDIC SURGERY

## 2021-04-22 RX ORDER — BISOPROLOL FUMARATE 5 MG/1
10 TABLET, FILM COATED ORAL
Status: DISCONTINUED | OUTPATIENT
Start: 2021-04-22 | End: 2021-05-01 | Stop reason: HOSPADM

## 2021-04-22 RX ORDER — METOPROLOL TARTRATE 5 MG/5ML
5 INJECTION INTRAVENOUS EVERY 4 HOURS PRN
Status: DISCONTINUED | OUTPATIENT
Start: 2021-04-22 | End: 2021-05-01 | Stop reason: HOSPADM

## 2021-04-22 RX ADMIN — PRAVASTATIN SODIUM 40 MG: 40 TABLET ORAL at 08:33

## 2021-04-22 RX ADMIN — BISOPROLOL FUMARATE 10 MG: 5 TABLET, COATED ORAL at 15:46

## 2021-04-22 RX ADMIN — SIMETHICONE 125 MG: 125 TABLET, CHEWABLE ORAL at 18:20

## 2021-04-22 RX ADMIN — METOPROLOL TARTRATE 5 MG: 5 INJECTION INTRAVENOUS at 16:07

## 2021-04-22 RX ADMIN — METOPROLOL TARTRATE 5 MG: 5 INJECTION INTRAVENOUS at 05:42

## 2021-04-22 RX ADMIN — SIMETHICONE 125 MG: 125 TABLET, CHEWABLE ORAL at 06:45

## 2021-04-22 RX ADMIN — SODIUM CHLORIDE 125 ML/HR: 9 INJECTION, SOLUTION INTRAVENOUS at 08:33

## 2021-04-22 RX ADMIN — ISOSORBIDE MONONITRATE 60 MG: 60 TABLET, EXTENDED RELEASE ORAL at 08:33

## 2021-04-22 RX ADMIN — Medication 1 TABLET: at 08:33

## 2021-04-22 RX ADMIN — SODIUM CHLORIDE, PRESERVATIVE FREE 10 ML: 5 INJECTION INTRAVENOUS at 21:05

## 2021-04-23 LAB
ANION GAP SERPL CALCULATED.3IONS-SCNC: 7 MMOL/L (ref 5–15)
BASOPHILS # BLD AUTO: 0.03 10*3/MM3 (ref 0–0.2)
BASOPHILS NFR BLD AUTO: 0.3 % (ref 0–1.5)
BH BB BLOOD EXPIRATION DATE: NORMAL
BH BB BLOOD EXPIRATION DATE: NORMAL
BH BB BLOOD TYPE BARCODE: NORMAL
BH BB BLOOD TYPE BARCODE: NORMAL
BH BB DISPENSE STATUS: NORMAL
BH BB DISPENSE STATUS: NORMAL
BH BB PRODUCT CODE: NORMAL
BH BB PRODUCT CODE: NORMAL
BH BB UNIT NUMBER: NORMAL
BH BB UNIT NUMBER: NORMAL
BUN SERPL-MCNC: 18 MG/DL (ref 8–23)
BUN/CREAT SERPL: 19.8 (ref 7–25)
CALCIUM SPEC-SCNC: 8.2 MG/DL (ref 8.2–9.6)
CHLORIDE SERPL-SCNC: 110 MMOL/L (ref 98–107)
CO2 SERPL-SCNC: 25 MMOL/L (ref 22–29)
CREAT SERPL-MCNC: 0.91 MG/DL (ref 0.76–1.27)
CROSSMATCH INTERPRETATION: NORMAL
CROSSMATCH INTERPRETATION: NORMAL
DEPRECATED RDW RBC AUTO: 54.3 FL (ref 37–54)
EOSINOPHIL # BLD AUTO: 0.06 10*3/MM3 (ref 0–0.4)
EOSINOPHIL NFR BLD AUTO: 0.7 % (ref 0.3–6.2)
ERYTHROCYTE [DISTWIDTH] IN BLOOD BY AUTOMATED COUNT: 16.1 % (ref 12.3–15.4)
GFR SERPL CREATININE-BSD FRML MDRD: 78 ML/MIN/1.73
GLUCOSE SERPL-MCNC: 152 MG/DL (ref 65–99)
HCT VFR BLD AUTO: 24.8 % (ref 37.5–51)
HGB BLD-MCNC: 8.5 G/DL (ref 13–17.7)
IMM GRANULOCYTES # BLD AUTO: 0.07 10*3/MM3 (ref 0–0.05)
IMM GRANULOCYTES NFR BLD AUTO: 0.8 % (ref 0–0.5)
LYMPHOCYTES # BLD AUTO: 1.26 10*3/MM3 (ref 0.7–3.1)
LYMPHOCYTES NFR BLD AUTO: 14.2 % (ref 19.6–45.3)
MCH RBC QN AUTO: 32.2 PG (ref 26.6–33)
MCHC RBC AUTO-ENTMCNC: 34.3 G/DL (ref 31.5–35.7)
MCV RBC AUTO: 93.9 FL (ref 79–97)
MONOCYTES # BLD AUTO: 1.08 10*3/MM3 (ref 0.1–0.9)
MONOCYTES NFR BLD AUTO: 12.1 % (ref 5–12)
NEUTROPHILS NFR BLD AUTO: 6.39 10*3/MM3 (ref 1.7–7)
NEUTROPHILS NFR BLD AUTO: 71.9 % (ref 42.7–76)
NRBC BLD AUTO-RTO: 0 /100 WBC (ref 0–0.2)
PLATELET # BLD AUTO: 191 10*3/MM3 (ref 140–450)
PMV BLD AUTO: 9.6 FL (ref 6–12)
POTASSIUM SERPL-SCNC: 3.7 MMOL/L (ref 3.5–5.2)
RBC # BLD AUTO: 2.64 10*6/MM3 (ref 4.14–5.8)
SODIUM SERPL-SCNC: 142 MMOL/L (ref 136–145)
UNIT  ABO: NORMAL
UNIT  ABO: NORMAL
UNIT  RH: NORMAL
UNIT  RH: NORMAL
WBC # BLD AUTO: 8.89 10*3/MM3 (ref 3.4–10.8)

## 2021-04-23 PROCEDURE — 94760 N-INVAS EAR/PLS OXIMETRY 1: CPT

## 2021-04-23 PROCEDURE — 85025 COMPLETE CBC W/AUTO DIFF WBC: CPT | Performed by: INTERNAL MEDICINE

## 2021-04-23 PROCEDURE — 80048 BASIC METABOLIC PNL TOTAL CA: CPT | Performed by: INTERNAL MEDICINE

## 2021-04-23 PROCEDURE — 94799 UNLISTED PULMONARY SVC/PX: CPT

## 2021-04-23 PROCEDURE — 99233 SBSQ HOSP IP/OBS HIGH 50: CPT | Performed by: NURSE PRACTITIONER

## 2021-04-23 PROCEDURE — 94640 AIRWAY INHALATION TREATMENT: CPT

## 2021-04-23 PROCEDURE — 99231 SBSQ HOSP IP/OBS SF/LOW 25: CPT | Performed by: ORTHOPAEDIC SURGERY

## 2021-04-23 RX ORDER — NYSTATIN 100000 [USP'U]/G
POWDER TOPICAL EVERY 12 HOURS SCHEDULED
Status: DISCONTINUED | OUTPATIENT
Start: 2021-04-23 | End: 2021-05-01 | Stop reason: HOSPADM

## 2021-04-23 RX ADMIN — ISOSORBIDE MONONITRATE 60 MG: 60 TABLET, EXTENDED RELEASE ORAL at 08:13

## 2021-04-23 RX ADMIN — SODIUM CHLORIDE, PRESERVATIVE FREE 10 ML: 5 INJECTION INTRAVENOUS at 08:13

## 2021-04-23 RX ADMIN — PRAVASTATIN SODIUM 40 MG: 40 TABLET ORAL at 08:12

## 2021-04-23 RX ADMIN — SODIUM CHLORIDE, PRESERVATIVE FREE 10 ML: 5 INJECTION INTRAVENOUS at 20:26

## 2021-04-23 RX ADMIN — NYSTATIN: 100000 POWDER TOPICAL at 12:25

## 2021-04-23 RX ADMIN — ACETAMINOPHEN 650 MG: 325 TABLET, FILM COATED ORAL at 03:48

## 2021-04-23 RX ADMIN — IPRATROPIUM BROMIDE AND ALBUTEROL SULFATE 3 ML: 2.5; .5 SOLUTION RESPIRATORY (INHALATION) at 17:10

## 2021-04-23 RX ADMIN — Medication 1 TABLET: at 08:13

## 2021-04-23 RX ADMIN — BISOPROLOL FUMARATE 10 MG: 5 TABLET, COATED ORAL at 08:12

## 2021-04-23 RX ADMIN — NYSTATIN: 100000 POWDER TOPICAL at 20:27

## 2021-04-23 RX ADMIN — SIMETHICONE 125 MG: 125 TABLET, CHEWABLE ORAL at 16:56

## 2021-04-23 RX ADMIN — SIMETHICONE 125 MG: 125 TABLET, CHEWABLE ORAL at 08:12

## 2021-04-23 RX ADMIN — ACETAMINOPHEN 650 MG: 325 TABLET, FILM COATED ORAL at 16:56

## 2021-04-23 RX ADMIN — SIMETHICONE 125 MG: 125 TABLET, CHEWABLE ORAL at 20:27

## 2021-04-23 RX ADMIN — SIMETHICONE 125 MG: 125 TABLET, CHEWABLE ORAL at 11:23

## 2021-04-24 LAB
ANION GAP SERPL CALCULATED.3IONS-SCNC: 5 MMOL/L (ref 5–15)
BASOPHILS # BLD AUTO: 0.04 10*3/MM3 (ref 0–0.2)
BASOPHILS NFR BLD AUTO: 0.5 % (ref 0–1.5)
BUN SERPL-MCNC: 16 MG/DL (ref 8–23)
BUN/CREAT SERPL: 17 (ref 7–25)
CALCIUM SPEC-SCNC: 8.6 MG/DL (ref 8.2–9.6)
CHLORIDE SERPL-SCNC: 104 MMOL/L (ref 98–107)
CO2 SERPL-SCNC: 30 MMOL/L (ref 22–29)
CREAT SERPL-MCNC: 0.94 MG/DL (ref 0.76–1.27)
DEPRECATED RDW RBC AUTO: 54.4 FL (ref 37–54)
EOSINOPHIL # BLD AUTO: 0.25 10*3/MM3 (ref 0–0.4)
EOSINOPHIL NFR BLD AUTO: 3.1 % (ref 0.3–6.2)
ERYTHROCYTE [DISTWIDTH] IN BLOOD BY AUTOMATED COUNT: 16.1 % (ref 12.3–15.4)
GFR SERPL CREATININE-BSD FRML MDRD: 75 ML/MIN/1.73
GLUCOSE SERPL-MCNC: 102 MG/DL (ref 65–99)
HCT VFR BLD AUTO: 25.6 % (ref 37.5–51)
HGB BLD-MCNC: 8.8 G/DL (ref 13–17.7)
IMM GRANULOCYTES # BLD AUTO: 0.04 10*3/MM3 (ref 0–0.05)
IMM GRANULOCYTES NFR BLD AUTO: 0.5 % (ref 0–0.5)
LYMPHOCYTES # BLD AUTO: 1.37 10*3/MM3 (ref 0.7–3.1)
LYMPHOCYTES NFR BLD AUTO: 16.9 % (ref 19.6–45.3)
MCH RBC QN AUTO: 32.7 PG (ref 26.6–33)
MCHC RBC AUTO-ENTMCNC: 34.4 G/DL (ref 31.5–35.7)
MCV RBC AUTO: 95.2 FL (ref 79–97)
MONOCYTES # BLD AUTO: 0.87 10*3/MM3 (ref 0.1–0.9)
MONOCYTES NFR BLD AUTO: 10.7 % (ref 5–12)
NEUTROPHILS NFR BLD AUTO: 5.55 10*3/MM3 (ref 1.7–7)
NEUTROPHILS NFR BLD AUTO: 68.3 % (ref 42.7–76)
NRBC BLD AUTO-RTO: 0.2 /100 WBC (ref 0–0.2)
PLATELET # BLD AUTO: 204 10*3/MM3 (ref 140–450)
PMV BLD AUTO: 9.7 FL (ref 6–12)
POTASSIUM SERPL-SCNC: 4.1 MMOL/L (ref 3.5–5.2)
RBC # BLD AUTO: 2.69 10*6/MM3 (ref 4.14–5.8)
SODIUM SERPL-SCNC: 139 MMOL/L (ref 136–145)
WBC # BLD AUTO: 8.12 10*3/MM3 (ref 3.4–10.8)

## 2021-04-24 PROCEDURE — 97166 OT EVAL MOD COMPLEX 45 MIN: CPT

## 2021-04-24 PROCEDURE — 94760 N-INVAS EAR/PLS OXIMETRY 1: CPT

## 2021-04-24 PROCEDURE — 97162 PT EVAL MOD COMPLEX 30 MIN: CPT

## 2021-04-24 PROCEDURE — 94799 UNLISTED PULMONARY SVC/PX: CPT

## 2021-04-24 PROCEDURE — 80048 BASIC METABOLIC PNL TOTAL CA: CPT | Performed by: INTERNAL MEDICINE

## 2021-04-24 PROCEDURE — 85025 COMPLETE CBC W/AUTO DIFF WBC: CPT | Performed by: INTERNAL MEDICINE

## 2021-04-24 RX ADMIN — SIMETHICONE 125 MG: 125 TABLET, CHEWABLE ORAL at 11:10

## 2021-04-24 RX ADMIN — ACETAMINOPHEN 650 MG: 325 TABLET, FILM COATED ORAL at 20:51

## 2021-04-24 RX ADMIN — SIMETHICONE 125 MG: 125 TABLET, CHEWABLE ORAL at 20:44

## 2021-04-24 RX ADMIN — Medication 1 TABLET: at 08:27

## 2021-04-24 RX ADMIN — SODIUM CHLORIDE, PRESERVATIVE FREE 10 ML: 5 INJECTION INTRAVENOUS at 20:45

## 2021-04-24 RX ADMIN — SIMETHICONE 125 MG: 125 TABLET, CHEWABLE ORAL at 17:24

## 2021-04-24 RX ADMIN — NYSTATIN: 100000 POWDER TOPICAL at 08:27

## 2021-04-24 RX ADMIN — PRAVASTATIN SODIUM 40 MG: 40 TABLET ORAL at 08:27

## 2021-04-24 RX ADMIN — SODIUM CHLORIDE, PRESERVATIVE FREE 10 ML: 5 INJECTION INTRAVENOUS at 08:26

## 2021-04-24 RX ADMIN — ACETAMINOPHEN 650 MG: 325 TABLET, FILM COATED ORAL at 03:51

## 2021-04-24 RX ADMIN — NYSTATIN: 100000 POWDER TOPICAL at 20:45

## 2021-04-24 RX ADMIN — BISOPROLOL FUMARATE 10 MG: 5 TABLET, COATED ORAL at 08:26

## 2021-04-24 RX ADMIN — ACETAMINOPHEN 650 MG: 325 TABLET, FILM COATED ORAL at 13:15

## 2021-04-24 RX ADMIN — ISOSORBIDE MONONITRATE 60 MG: 60 TABLET, EXTENDED RELEASE ORAL at 08:27

## 2021-04-24 RX ADMIN — IPRATROPIUM BROMIDE AND ALBUTEROL SULFATE 3 ML: 2.5; .5 SOLUTION RESPIRATORY (INHALATION) at 13:23

## 2021-04-24 RX ADMIN — SIMETHICONE 125 MG: 125 TABLET, CHEWABLE ORAL at 08:27

## 2021-04-25 ENCOUNTER — APPOINTMENT (OUTPATIENT)
Dept: GENERAL RADIOLOGY | Facility: HOSPITAL | Age: 86
End: 2021-04-25

## 2021-04-25 LAB
ANION GAP SERPL CALCULATED.3IONS-SCNC: 7 MMOL/L (ref 5–15)
BASOPHILS # BLD AUTO: 0.04 10*3/MM3 (ref 0–0.2)
BASOPHILS NFR BLD AUTO: 0.5 % (ref 0–1.5)
BUN SERPL-MCNC: 19 MG/DL (ref 8–23)
BUN/CREAT SERPL: 19.4 (ref 7–25)
CALCIUM SPEC-SCNC: 8.5 MG/DL (ref 8.2–9.6)
CHLORIDE SERPL-SCNC: 102 MMOL/L (ref 98–107)
CO2 SERPL-SCNC: 30 MMOL/L (ref 22–29)
CREAT SERPL-MCNC: 0.98 MG/DL (ref 0.76–1.27)
DEPRECATED RDW RBC AUTO: 53.6 FL (ref 37–54)
EOSINOPHIL # BLD AUTO: 0.24 10*3/MM3 (ref 0–0.4)
EOSINOPHIL NFR BLD AUTO: 3.1 % (ref 0.3–6.2)
ERYTHROCYTE [DISTWIDTH] IN BLOOD BY AUTOMATED COUNT: 15.7 % (ref 12.3–15.4)
GFR SERPL CREATININE-BSD FRML MDRD: 71 ML/MIN/1.73
GLUCOSE SERPL-MCNC: 104 MG/DL (ref 65–99)
HCT VFR BLD AUTO: 25.4 % (ref 37.5–51)
HGB BLD-MCNC: 8.4 G/DL (ref 13–17.7)
IMM GRANULOCYTES # BLD AUTO: 0.04 10*3/MM3 (ref 0–0.05)
IMM GRANULOCYTES NFR BLD AUTO: 0.5 % (ref 0–0.5)
LYMPHOCYTES # BLD AUTO: 1.23 10*3/MM3 (ref 0.7–3.1)
LYMPHOCYTES NFR BLD AUTO: 16.1 % (ref 19.6–45.3)
MCH RBC QN AUTO: 31.6 PG (ref 26.6–33)
MCHC RBC AUTO-ENTMCNC: 33.1 G/DL (ref 31.5–35.7)
MCV RBC AUTO: 95.5 FL (ref 79–97)
MONOCYTES # BLD AUTO: 0.85 10*3/MM3 (ref 0.1–0.9)
MONOCYTES NFR BLD AUTO: 11.2 % (ref 5–12)
NEUTROPHILS NFR BLD AUTO: 5.22 10*3/MM3 (ref 1.7–7)
NEUTROPHILS NFR BLD AUTO: 68.6 % (ref 42.7–76)
NRBC BLD AUTO-RTO: 0.5 /100 WBC (ref 0–0.2)
PLATELET # BLD AUTO: 229 10*3/MM3 (ref 140–450)
PMV BLD AUTO: 9.4 FL (ref 6–12)
POTASSIUM SERPL-SCNC: 4.2 MMOL/L (ref 3.5–5.2)
RBC # BLD AUTO: 2.66 10*6/MM3 (ref 4.14–5.8)
SODIUM SERPL-SCNC: 139 MMOL/L (ref 136–145)
WBC # BLD AUTO: 7.62 10*3/MM3 (ref 3.4–10.8)

## 2021-04-25 PROCEDURE — 97530 THERAPEUTIC ACTIVITIES: CPT

## 2021-04-25 PROCEDURE — 80048 BASIC METABOLIC PNL TOTAL CA: CPT | Performed by: INTERNAL MEDICINE

## 2021-04-25 PROCEDURE — 94640 AIRWAY INHALATION TREATMENT: CPT

## 2021-04-25 PROCEDURE — 97116 GAIT TRAINING THERAPY: CPT

## 2021-04-25 PROCEDURE — 94664 DEMO&/EVAL PT USE INHALER: CPT

## 2021-04-25 PROCEDURE — 97110 THERAPEUTIC EXERCISES: CPT

## 2021-04-25 PROCEDURE — 85025 COMPLETE CBC W/AUTO DIFF WBC: CPT | Performed by: INTERNAL MEDICINE

## 2021-04-25 PROCEDURE — 71045 X-RAY EXAM CHEST 1 VIEW: CPT

## 2021-04-25 RX ADMIN — SIMETHICONE 125 MG: 125 TABLET, CHEWABLE ORAL at 08:33

## 2021-04-25 RX ADMIN — NYSTATIN: 100000 POWDER TOPICAL at 08:33

## 2021-04-25 RX ADMIN — ACETAMINOPHEN 650 MG: 325 TABLET, FILM COATED ORAL at 20:46

## 2021-04-25 RX ADMIN — PRAVASTATIN SODIUM 40 MG: 40 TABLET ORAL at 08:33

## 2021-04-25 RX ADMIN — SIMETHICONE 125 MG: 125 TABLET, CHEWABLE ORAL at 17:07

## 2021-04-25 RX ADMIN — BISOPROLOL FUMARATE 10 MG: 5 TABLET, COATED ORAL at 08:33

## 2021-04-25 RX ADMIN — Medication 1 TABLET: at 08:33

## 2021-04-25 RX ADMIN — SIMETHICONE 125 MG: 125 TABLET, CHEWABLE ORAL at 11:10

## 2021-04-25 RX ADMIN — ISOSORBIDE MONONITRATE 60 MG: 60 TABLET, EXTENDED RELEASE ORAL at 08:33

## 2021-04-25 RX ADMIN — SIMETHICONE 125 MG: 125 TABLET, CHEWABLE ORAL at 20:45

## 2021-04-25 RX ADMIN — IPRATROPIUM BROMIDE AND ALBUTEROL SULFATE 3 ML: 2.5; .5 SOLUTION RESPIRATORY (INHALATION) at 12:11

## 2021-04-25 RX ADMIN — SODIUM CHLORIDE, PRESERVATIVE FREE 10 ML: 5 INJECTION INTRAVENOUS at 08:33

## 2021-04-25 RX ADMIN — NYSTATIN: 100000 POWDER TOPICAL at 20:45

## 2021-04-26 LAB
ANION GAP SERPL CALCULATED.3IONS-SCNC: 8 MMOL/L (ref 5–15)
BACTERIA SPEC AEROBE CULT: NORMAL
BACTERIA SPEC AEROBE CULT: NORMAL
BASOPHILS # BLD AUTO: 0.01 10*3/MM3 (ref 0–0.2)
BASOPHILS NFR BLD AUTO: 0.1 % (ref 0–1.5)
BUN SERPL-MCNC: 21 MG/DL (ref 8–23)
BUN/CREAT SERPL: 18.6 (ref 7–25)
CALCIUM SPEC-SCNC: 8.5 MG/DL (ref 8.2–9.6)
CHLORIDE SERPL-SCNC: 99 MMOL/L (ref 98–107)
CO2 SERPL-SCNC: 30 MMOL/L (ref 22–29)
CREAT SERPL-MCNC: 1.13 MG/DL (ref 0.76–1.27)
DEPRECATED RDW RBC AUTO: 53.6 FL (ref 37–54)
EOSINOPHIL # BLD AUTO: 0.13 10*3/MM3 (ref 0–0.4)
EOSINOPHIL NFR BLD AUTO: 1.5 % (ref 0.3–6.2)
ERYTHROCYTE [DISTWIDTH] IN BLOOD BY AUTOMATED COUNT: 15.9 % (ref 12.3–15.4)
GFR SERPL CREATININE-BSD FRML MDRD: 60 ML/MIN/1.73
GLUCOSE SERPL-MCNC: 114 MG/DL (ref 65–99)
HCT VFR BLD AUTO: 26.9 % (ref 37.5–51)
HGB BLD-MCNC: 8.8 G/DL (ref 13–17.7)
IMM GRANULOCYTES # BLD AUTO: 0.04 10*3/MM3 (ref 0–0.05)
IMM GRANULOCYTES NFR BLD AUTO: 0.5 % (ref 0–0.5)
LYMPHOCYTES # BLD AUTO: 0.96 10*3/MM3 (ref 0.7–3.1)
LYMPHOCYTES NFR BLD AUTO: 11.4 % (ref 19.6–45.3)
MCH RBC QN AUTO: 31.7 PG (ref 26.6–33)
MCHC RBC AUTO-ENTMCNC: 32.7 G/DL (ref 31.5–35.7)
MCV RBC AUTO: 96.8 FL (ref 79–97)
MONOCYTES # BLD AUTO: 1.03 10*3/MM3 (ref 0.1–0.9)
MONOCYTES NFR BLD AUTO: 12.3 % (ref 5–12)
NEUTROPHILS NFR BLD AUTO: 6.23 10*3/MM3 (ref 1.7–7)
NEUTROPHILS NFR BLD AUTO: 74.2 % (ref 42.7–76)
NRBC BLD AUTO-RTO: 0 /100 WBC (ref 0–0.2)
PLATELET # BLD AUTO: 249 10*3/MM3 (ref 140–450)
PMV BLD AUTO: 9.3 FL (ref 6–12)
POTASSIUM SERPL-SCNC: 4.3 MMOL/L (ref 3.5–5.2)
RBC # BLD AUTO: 2.78 10*6/MM3 (ref 4.14–5.8)
SODIUM SERPL-SCNC: 137 MMOL/L (ref 136–145)
WBC # BLD AUTO: 8.4 10*3/MM3 (ref 3.4–10.8)

## 2021-04-26 PROCEDURE — 97110 THERAPEUTIC EXERCISES: CPT

## 2021-04-26 PROCEDURE — 85025 COMPLETE CBC W/AUTO DIFF WBC: CPT | Performed by: INTERNAL MEDICINE

## 2021-04-26 PROCEDURE — 99233 SBSQ HOSP IP/OBS HIGH 50: CPT | Performed by: INTERNAL MEDICINE

## 2021-04-26 PROCEDURE — 97116 GAIT TRAINING THERAPY: CPT

## 2021-04-26 PROCEDURE — 99231 SBSQ HOSP IP/OBS SF/LOW 25: CPT | Performed by: ORTHOPAEDIC SURGERY

## 2021-04-26 PROCEDURE — 80048 BASIC METABOLIC PNL TOTAL CA: CPT | Performed by: INTERNAL MEDICINE

## 2021-04-26 PROCEDURE — 97530 THERAPEUTIC ACTIVITIES: CPT

## 2021-04-26 RX ORDER — HYDROCHLOROTHIAZIDE 12.5 MG/1
12.5 TABLET ORAL DAILY
Status: DISCONTINUED | OUTPATIENT
Start: 2021-04-26 | End: 2021-05-01 | Stop reason: HOSPADM

## 2021-04-26 RX ADMIN — NYSTATIN: 100000 POWDER TOPICAL at 21:27

## 2021-04-26 RX ADMIN — HYDROCHLOROTHIAZIDE 12.5 MG: 12.5 TABLET ORAL at 12:20

## 2021-04-26 RX ADMIN — NYSTATIN: 100000 POWDER TOPICAL at 08:56

## 2021-04-26 RX ADMIN — SIMETHICONE 125 MG: 125 TABLET, CHEWABLE ORAL at 16:58

## 2021-04-26 RX ADMIN — ACETAMINOPHEN 650 MG: 325 TABLET, FILM COATED ORAL at 16:58

## 2021-04-26 RX ADMIN — SODIUM CHLORIDE, PRESERVATIVE FREE 10 ML: 5 INJECTION INTRAVENOUS at 21:14

## 2021-04-26 RX ADMIN — PRAVASTATIN SODIUM 40 MG: 40 TABLET ORAL at 08:56

## 2021-04-26 RX ADMIN — Medication 1 TABLET: at 08:56

## 2021-04-26 RX ADMIN — ISOSORBIDE MONONITRATE 60 MG: 60 TABLET, EXTENDED RELEASE ORAL at 08:56

## 2021-04-26 RX ADMIN — ACETAMINOPHEN 650 MG: 325 TABLET, FILM COATED ORAL at 21:26

## 2021-04-26 RX ADMIN — SIMETHICONE 125 MG: 125 TABLET, CHEWABLE ORAL at 11:14

## 2021-04-26 RX ADMIN — SODIUM CHLORIDE, PRESERVATIVE FREE 10 ML: 5 INJECTION INTRAVENOUS at 08:56

## 2021-04-26 RX ADMIN — SIMETHICONE 125 MG: 125 TABLET, CHEWABLE ORAL at 21:14

## 2021-04-26 RX ADMIN — SIMETHICONE 125 MG: 125 TABLET, CHEWABLE ORAL at 08:56

## 2021-04-26 RX ADMIN — BISOPROLOL FUMARATE 10 MG: 5 TABLET, COATED ORAL at 08:56

## 2021-04-27 LAB
ANION GAP SERPL CALCULATED.3IONS-SCNC: 6 MMOL/L (ref 5–15)
BASOPHILS # BLD AUTO: 0.03 10*3/MM3 (ref 0–0.2)
BASOPHILS NFR BLD AUTO: 0.4 % (ref 0–1.5)
BUN SERPL-MCNC: 22 MG/DL (ref 8–23)
BUN/CREAT SERPL: 18.8 (ref 7–25)
CALCIUM SPEC-SCNC: 9 MG/DL (ref 8.2–9.6)
CHLORIDE SERPL-SCNC: 98 MMOL/L (ref 98–107)
CO2 SERPL-SCNC: 30 MMOL/L (ref 22–29)
CREAT SERPL-MCNC: 1.17 MG/DL (ref 0.76–1.27)
DEPRECATED RDW RBC AUTO: 54.1 FL (ref 37–54)
EOSINOPHIL # BLD AUTO: 0.13 10*3/MM3 (ref 0–0.4)
EOSINOPHIL NFR BLD AUTO: 1.7 % (ref 0.3–6.2)
ERYTHROCYTE [DISTWIDTH] IN BLOOD BY AUTOMATED COUNT: 15.8 % (ref 12.3–15.4)
GFR SERPL CREATININE-BSD FRML MDRD: 58 ML/MIN/1.73
GLUCOSE SERPL-MCNC: 108 MG/DL (ref 65–99)
HCT VFR BLD AUTO: 28.3 % (ref 37.5–51)
HGB BLD-MCNC: 9.3 G/DL (ref 13–17.7)
IMM GRANULOCYTES # BLD AUTO: 0.02 10*3/MM3 (ref 0–0.05)
IMM GRANULOCYTES NFR BLD AUTO: 0.3 % (ref 0–0.5)
LYMPHOCYTES # BLD AUTO: 0.87 10*3/MM3 (ref 0.7–3.1)
LYMPHOCYTES NFR BLD AUTO: 11.6 % (ref 19.6–45.3)
MCH RBC QN AUTO: 31.6 PG (ref 26.6–33)
MCHC RBC AUTO-ENTMCNC: 32.9 G/DL (ref 31.5–35.7)
MCV RBC AUTO: 96.3 FL (ref 79–97)
MONOCYTES # BLD AUTO: 1.07 10*3/MM3 (ref 0.1–0.9)
MONOCYTES NFR BLD AUTO: 14.2 % (ref 5–12)
NEUTROPHILS NFR BLD AUTO: 5.41 10*3/MM3 (ref 1.7–7)
NEUTROPHILS NFR BLD AUTO: 71.8 % (ref 42.7–76)
NRBC BLD AUTO-RTO: 0.3 /100 WBC (ref 0–0.2)
PLATELET # BLD AUTO: 268 10*3/MM3 (ref 140–450)
PMV BLD AUTO: 8.9 FL (ref 6–12)
POTASSIUM SERPL-SCNC: 3.9 MMOL/L (ref 3.5–5.2)
RBC # BLD AUTO: 2.94 10*6/MM3 (ref 4.14–5.8)
SODIUM SERPL-SCNC: 134 MMOL/L (ref 136–145)
WBC # BLD AUTO: 7.53 10*3/MM3 (ref 3.4–10.8)

## 2021-04-27 PROCEDURE — 97530 THERAPEUTIC ACTIVITIES: CPT

## 2021-04-27 PROCEDURE — 94799 UNLISTED PULMONARY SVC/PX: CPT

## 2021-04-27 PROCEDURE — 99233 SBSQ HOSP IP/OBS HIGH 50: CPT | Performed by: INTERNAL MEDICINE

## 2021-04-27 PROCEDURE — 97110 THERAPEUTIC EXERCISES: CPT

## 2021-04-27 PROCEDURE — 80048 BASIC METABOLIC PNL TOTAL CA: CPT | Performed by: INTERNAL MEDICINE

## 2021-04-27 PROCEDURE — 97116 GAIT TRAINING THERAPY: CPT

## 2021-04-27 PROCEDURE — 85025 COMPLETE CBC W/AUTO DIFF WBC: CPT | Performed by: INTERNAL MEDICINE

## 2021-04-27 PROCEDURE — 94760 N-INVAS EAR/PLS OXIMETRY 1: CPT

## 2021-04-27 RX ORDER — DIGOXIN 125 MCG
125 TABLET ORAL
Status: DISCONTINUED | OUTPATIENT
Start: 2021-04-28 | End: 2021-05-01 | Stop reason: HOSPADM

## 2021-04-27 RX ADMIN — NYSTATIN: 100000 POWDER TOPICAL at 20:23

## 2021-04-27 RX ADMIN — APIXABAN 2.5 MG: 2.5 TABLET, FILM COATED ORAL at 20:23

## 2021-04-27 RX ADMIN — SIMETHICONE 125 MG: 125 TABLET, CHEWABLE ORAL at 08:39

## 2021-04-27 RX ADMIN — SIMETHICONE 125 MG: 125 TABLET, CHEWABLE ORAL at 20:23

## 2021-04-27 RX ADMIN — ACETAMINOPHEN 650 MG: 325 TABLET, FILM COATED ORAL at 09:56

## 2021-04-27 RX ADMIN — SIMETHICONE 125 MG: 125 TABLET, CHEWABLE ORAL at 17:25

## 2021-04-27 RX ADMIN — SODIUM CHLORIDE, PRESERVATIVE FREE 10 ML: 5 INJECTION INTRAVENOUS at 08:41

## 2021-04-27 RX ADMIN — SIMETHICONE 125 MG: 125 TABLET, CHEWABLE ORAL at 11:48

## 2021-04-27 RX ADMIN — HYDROCHLOROTHIAZIDE 12.5 MG: 12.5 TABLET ORAL at 08:40

## 2021-04-27 RX ADMIN — PRAVASTATIN SODIUM 40 MG: 40 TABLET ORAL at 08:40

## 2021-04-27 RX ADMIN — ISOSORBIDE MONONITRATE 60 MG: 60 TABLET, EXTENDED RELEASE ORAL at 08:39

## 2021-04-27 RX ADMIN — NYSTATIN: 100000 POWDER TOPICAL at 08:41

## 2021-04-27 RX ADMIN — APIXABAN 2.5 MG: 2.5 TABLET, FILM COATED ORAL at 08:39

## 2021-04-27 RX ADMIN — SODIUM CHLORIDE, PRESERVATIVE FREE 10 ML: 5 INJECTION INTRAVENOUS at 20:23

## 2021-04-27 RX ADMIN — Medication 1 TABLET: at 08:40

## 2021-04-27 RX ADMIN — BISOPROLOL FUMARATE 10 MG: 5 TABLET, COATED ORAL at 08:40

## 2021-04-27 RX ADMIN — ACETAMINOPHEN 650 MG: 325 TABLET, FILM COATED ORAL at 16:03

## 2021-04-28 LAB
ANION GAP SERPL CALCULATED.3IONS-SCNC: 7 MMOL/L (ref 5–15)
BASOPHILS # BLD AUTO: 0.03 10*3/MM3 (ref 0–0.2)
BASOPHILS NFR BLD AUTO: 0.4 % (ref 0–1.5)
BUN SERPL-MCNC: 20 MG/DL (ref 8–23)
BUN/CREAT SERPL: 17.2 (ref 7–25)
CALCIUM SPEC-SCNC: 8.5 MG/DL (ref 8.2–9.6)
CHLORIDE SERPL-SCNC: 97 MMOL/L (ref 98–107)
CO2 SERPL-SCNC: 30 MMOL/L (ref 22–29)
CREAT SERPL-MCNC: 1.16 MG/DL (ref 0.76–1.27)
DEPRECATED RDW RBC AUTO: 53.4 FL (ref 37–54)
EOSINOPHIL # BLD AUTO: 0.11 10*3/MM3 (ref 0–0.4)
EOSINOPHIL NFR BLD AUTO: 1.3 % (ref 0.3–6.2)
ERYTHROCYTE [DISTWIDTH] IN BLOOD BY AUTOMATED COUNT: 15.5 % (ref 12.3–15.4)
GFR SERPL CREATININE-BSD FRML MDRD: 59 ML/MIN/1.73
GLUCOSE SERPL-MCNC: 114 MG/DL (ref 65–99)
HCT VFR BLD AUTO: 26.5 % (ref 37.5–51)
HGB BLD-MCNC: 8.8 G/DL (ref 13–17.7)
IMM GRANULOCYTES # BLD AUTO: 0.04 10*3/MM3 (ref 0–0.05)
IMM GRANULOCYTES NFR BLD AUTO: 0.5 % (ref 0–0.5)
LYMPHOCYTES # BLD AUTO: 0.99 10*3/MM3 (ref 0.7–3.1)
LYMPHOCYTES NFR BLD AUTO: 12.1 % (ref 19.6–45.3)
MCH RBC QN AUTO: 31.7 PG (ref 26.6–33)
MCHC RBC AUTO-ENTMCNC: 33.2 G/DL (ref 31.5–35.7)
MCV RBC AUTO: 95.3 FL (ref 79–97)
MONOCYTES # BLD AUTO: 1.23 10*3/MM3 (ref 0.1–0.9)
MONOCYTES NFR BLD AUTO: 15.1 % (ref 5–12)
NEUTROPHILS NFR BLD AUTO: 5.75 10*3/MM3 (ref 1.7–7)
NEUTROPHILS NFR BLD AUTO: 70.6 % (ref 42.7–76)
NRBC BLD AUTO-RTO: 0 /100 WBC (ref 0–0.2)
PLATELET # BLD AUTO: 293 10*3/MM3 (ref 140–450)
PMV BLD AUTO: 9 FL (ref 6–12)
POTASSIUM SERPL-SCNC: 3.6 MMOL/L (ref 3.5–5.2)
RBC # BLD AUTO: 2.78 10*6/MM3 (ref 4.14–5.8)
SODIUM SERPL-SCNC: 134 MMOL/L (ref 136–145)
WBC # BLD AUTO: 8.15 10*3/MM3 (ref 3.4–10.8)

## 2021-04-28 PROCEDURE — 80048 BASIC METABOLIC PNL TOTAL CA: CPT | Performed by: INTERNAL MEDICINE

## 2021-04-28 PROCEDURE — 97110 THERAPEUTIC EXERCISES: CPT

## 2021-04-28 PROCEDURE — 97530 THERAPEUTIC ACTIVITIES: CPT

## 2021-04-28 PROCEDURE — 97116 GAIT TRAINING THERAPY: CPT

## 2021-04-28 PROCEDURE — 97535 SELF CARE MNGMENT TRAINING: CPT

## 2021-04-28 PROCEDURE — 85025 COMPLETE CBC W/AUTO DIFF WBC: CPT | Performed by: INTERNAL MEDICINE

## 2021-04-28 RX ADMIN — PRAVASTATIN SODIUM 40 MG: 40 TABLET ORAL at 08:14

## 2021-04-28 RX ADMIN — SIMETHICONE 125 MG: 125 TABLET, CHEWABLE ORAL at 11:14

## 2021-04-28 RX ADMIN — SIMETHICONE 125 MG: 125 TABLET, CHEWABLE ORAL at 08:13

## 2021-04-28 RX ADMIN — ACETAMINOPHEN 650 MG: 325 TABLET, FILM COATED ORAL at 19:04

## 2021-04-28 RX ADMIN — APIXABAN 5 MG: 5 TABLET, FILM COATED ORAL at 19:04

## 2021-04-28 RX ADMIN — SODIUM CHLORIDE, PRESERVATIVE FREE 10 ML: 5 INJECTION INTRAVENOUS at 08:15

## 2021-04-28 RX ADMIN — DIGOXIN 125 MCG: 125 TABLET ORAL at 08:14

## 2021-04-28 RX ADMIN — BISOPROLOL FUMARATE 10 MG: 5 TABLET, COATED ORAL at 08:13

## 2021-04-28 RX ADMIN — SIMETHICONE 125 MG: 125 TABLET, CHEWABLE ORAL at 16:58

## 2021-04-28 RX ADMIN — NYSTATIN: 100000 POWDER TOPICAL at 19:05

## 2021-04-28 RX ADMIN — APIXABAN 2.5 MG: 2.5 TABLET, FILM COATED ORAL at 08:14

## 2021-04-28 RX ADMIN — SODIUM CHLORIDE, PRESERVATIVE FREE 10 ML: 5 INJECTION INTRAVENOUS at 19:05

## 2021-04-28 RX ADMIN — Medication 1 TABLET: at 08:14

## 2021-04-28 RX ADMIN — ACETAMINOPHEN 650 MG: 325 TABLET, FILM COATED ORAL at 09:26

## 2021-04-28 RX ADMIN — NYSTATIN: 100000 POWDER TOPICAL at 08:15

## 2021-04-28 RX ADMIN — HYDROCHLOROTHIAZIDE 12.5 MG: 12.5 TABLET ORAL at 08:14

## 2021-04-28 RX ADMIN — ISOSORBIDE MONONITRATE 60 MG: 60 TABLET, EXTENDED RELEASE ORAL at 08:14

## 2021-04-28 RX ADMIN — SIMETHICONE 125 MG: 125 TABLET, CHEWABLE ORAL at 19:04

## 2021-04-29 ENCOUNTER — APPOINTMENT (OUTPATIENT)
Dept: GENERAL RADIOLOGY | Facility: HOSPITAL | Age: 86
End: 2021-04-29

## 2021-04-29 ENCOUNTER — APPOINTMENT (OUTPATIENT)
Dept: CT IMAGING | Facility: HOSPITAL | Age: 86
End: 2021-04-29

## 2021-04-29 LAB
ANION GAP SERPL CALCULATED.3IONS-SCNC: 11 MMOL/L (ref 5–15)
BACTERIA UR QL AUTO: ABNORMAL /HPF
BASOPHILS # BLD AUTO: 0.02 10*3/MM3 (ref 0–0.2)
BASOPHILS NFR BLD AUTO: 0.2 % (ref 0–1.5)
BILIRUB UR QL STRIP: ABNORMAL
BUN SERPL-MCNC: 21 MG/DL (ref 8–23)
BUN/CREAT SERPL: 21.9 (ref 7–25)
CALCIUM SPEC-SCNC: 9.3 MG/DL (ref 8.2–9.6)
CHLORIDE SERPL-SCNC: 97 MMOL/L (ref 98–107)
CLARITY UR: CLEAR
CO2 SERPL-SCNC: 28 MMOL/L (ref 22–29)
COLOR UR: YELLOW
CREAT SERPL-MCNC: 0.96 MG/DL (ref 0.76–1.27)
DEPRECATED RDW RBC AUTO: 53.1 FL (ref 37–54)
EOSINOPHIL # BLD AUTO: 0.04 10*3/MM3 (ref 0–0.4)
EOSINOPHIL NFR BLD AUTO: 0.4 % (ref 0.3–6.2)
ERYTHROCYTE [DISTWIDTH] IN BLOOD BY AUTOMATED COUNT: 15.4 % (ref 12.3–15.4)
GFR SERPL CREATININE-BSD FRML MDRD: 73 ML/MIN/1.73
GLUCOSE SERPL-MCNC: 122 MG/DL (ref 65–99)
GLUCOSE UR STRIP-MCNC: NEGATIVE MG/DL
HCT VFR BLD AUTO: 32.3 % (ref 37.5–51)
HGB BLD-MCNC: 10.5 G/DL (ref 13–17.7)
HGB UR QL STRIP.AUTO: NEGATIVE
HYALINE CASTS UR QL AUTO: ABNORMAL /LPF
IMM GRANULOCYTES # BLD AUTO: 0.05 10*3/MM3 (ref 0–0.05)
IMM GRANULOCYTES NFR BLD AUTO: 0.5 % (ref 0–0.5)
KETONES UR QL STRIP: NEGATIVE
LEUKOCYTE ESTERASE UR QL STRIP.AUTO: NEGATIVE
LYMPHOCYTES # BLD AUTO: 0.95 10*3/MM3 (ref 0.7–3.1)
LYMPHOCYTES NFR BLD AUTO: 10.4 % (ref 19.6–45.3)
MCH RBC QN AUTO: 31.4 PG (ref 26.6–33)
MCHC RBC AUTO-ENTMCNC: 32.5 G/DL (ref 31.5–35.7)
MCV RBC AUTO: 96.7 FL (ref 79–97)
MONOCYTES # BLD AUTO: 1.18 10*3/MM3 (ref 0.1–0.9)
MONOCYTES NFR BLD AUTO: 12.9 % (ref 5–12)
NEUTROPHILS NFR BLD AUTO: 6.89 10*3/MM3 (ref 1.7–7)
NEUTROPHILS NFR BLD AUTO: 75.6 % (ref 42.7–76)
NITRITE UR QL STRIP: NEGATIVE
NRBC BLD AUTO-RTO: 0 /100 WBC (ref 0–0.2)
PH UR STRIP.AUTO: 6.5 [PH] (ref 5–9)
PLATELET # BLD AUTO: 365 10*3/MM3 (ref 140–450)
PMV BLD AUTO: 9.2 FL (ref 6–12)
POTASSIUM SERPL-SCNC: 3.7 MMOL/L (ref 3.5–5.2)
PROT UR QL STRIP: ABNORMAL
RBC # BLD AUTO: 3.34 10*6/MM3 (ref 4.14–5.8)
RBC # UR: ABNORMAL /HPF
REF LAB TEST METHOD: ABNORMAL
SODIUM SERPL-SCNC: 136 MMOL/L (ref 136–145)
SP GR UR STRIP: 1.02 (ref 1–1.03)
SQUAMOUS #/AREA URNS HPF: ABNORMAL /HPF
UROBILINOGEN UR QL STRIP: ABNORMAL
WBC # BLD AUTO: 9.13 10*3/MM3 (ref 3.4–10.8)
WBC UR QL AUTO: ABNORMAL /HPF

## 2021-04-29 PROCEDURE — 85025 COMPLETE CBC W/AUTO DIFF WBC: CPT | Performed by: INTERNAL MEDICINE

## 2021-04-29 PROCEDURE — 80048 BASIC METABOLIC PNL TOTAL CA: CPT | Performed by: INTERNAL MEDICINE

## 2021-04-29 PROCEDURE — 70450 CT HEAD/BRAIN W/O DYE: CPT

## 2021-04-29 PROCEDURE — 97110 THERAPEUTIC EXERCISES: CPT

## 2021-04-29 PROCEDURE — 81001 URINALYSIS AUTO W/SCOPE: CPT | Performed by: HOSPITALIST

## 2021-04-29 PROCEDURE — 97535 SELF CARE MNGMENT TRAINING: CPT

## 2021-04-29 PROCEDURE — 73030 X-RAY EXAM OF SHOULDER: CPT

## 2021-04-29 PROCEDURE — 94799 UNLISTED PULMONARY SVC/PX: CPT

## 2021-04-29 RX ADMIN — SODIUM CHLORIDE, PRESERVATIVE FREE 10 ML: 5 INJECTION INTRAVENOUS at 20:37

## 2021-04-29 RX ADMIN — IPRATROPIUM BROMIDE AND ALBUTEROL SULFATE 3 ML: 2.5; .5 SOLUTION RESPIRATORY (INHALATION) at 19:06

## 2021-04-29 RX ADMIN — APIXABAN 5 MG: 5 TABLET, FILM COATED ORAL at 20:37

## 2021-04-29 RX ADMIN — NYSTATIN: 100000 POWDER TOPICAL at 20:37

## 2021-04-29 RX ADMIN — BISOPROLOL FUMARATE 10 MG: 5 TABLET, COATED ORAL at 08:19

## 2021-04-29 RX ADMIN — ISOSORBIDE MONONITRATE 60 MG: 60 TABLET, EXTENDED RELEASE ORAL at 08:19

## 2021-04-29 RX ADMIN — NYSTATIN: 100000 POWDER TOPICAL at 08:19

## 2021-04-29 RX ADMIN — PRAVASTATIN SODIUM 40 MG: 40 TABLET ORAL at 08:19

## 2021-04-29 RX ADMIN — Medication 1 TABLET: at 08:19

## 2021-04-29 RX ADMIN — APIXABAN 5 MG: 5 TABLET, FILM COATED ORAL at 08:19

## 2021-04-29 RX ADMIN — SIMETHICONE 125 MG: 125 TABLET, CHEWABLE ORAL at 08:20

## 2021-04-29 RX ADMIN — ACETAMINOPHEN 650 MG: 325 TABLET, FILM COATED ORAL at 00:14

## 2021-04-29 RX ADMIN — SIMETHICONE 125 MG: 125 TABLET, CHEWABLE ORAL at 10:57

## 2021-04-29 RX ADMIN — HYDROCHLOROTHIAZIDE 12.5 MG: 12.5 TABLET ORAL at 08:19

## 2021-04-29 RX ADMIN — DIGOXIN 125 MCG: 125 TABLET ORAL at 12:13

## 2021-04-29 RX ADMIN — SODIUM CHLORIDE, PRESERVATIVE FREE 10 ML: 5 INJECTION INTRAVENOUS at 08:20

## 2021-04-29 RX ADMIN — SIMETHICONE 125 MG: 125 TABLET, CHEWABLE ORAL at 20:37

## 2021-04-30 LAB
ANION GAP SERPL CALCULATED.3IONS-SCNC: 10 MMOL/L (ref 5–15)
BASOPHILS # BLD AUTO: 0.03 10*3/MM3 (ref 0–0.2)
BASOPHILS NFR BLD AUTO: 0.3 % (ref 0–1.5)
BUN SERPL-MCNC: 20 MG/DL (ref 8–23)
BUN/CREAT SERPL: 19.6 (ref 7–25)
CALCIUM SPEC-SCNC: 8.9 MG/DL (ref 8.2–9.6)
CHLORIDE SERPL-SCNC: 96 MMOL/L (ref 98–107)
CO2 SERPL-SCNC: 28 MMOL/L (ref 22–29)
CREAT SERPL-MCNC: 1.02 MG/DL (ref 0.76–1.27)
DEPRECATED RDW RBC AUTO: 51.8 FL (ref 37–54)
EOSINOPHIL # BLD AUTO: 0.02 10*3/MM3 (ref 0–0.4)
EOSINOPHIL NFR BLD AUTO: 0.2 % (ref 0.3–6.2)
ERYTHROCYTE [DISTWIDTH] IN BLOOD BY AUTOMATED COUNT: 15.1 % (ref 12.3–15.4)
GFR SERPL CREATININE-BSD FRML MDRD: 68 ML/MIN/1.73
GLUCOSE SERPL-MCNC: 134 MG/DL (ref 65–99)
HCT VFR BLD AUTO: 30.2 % (ref 37.5–51)
HGB BLD-MCNC: 10 G/DL (ref 13–17.7)
IMM GRANULOCYTES # BLD AUTO: 0.05 10*3/MM3 (ref 0–0.05)
IMM GRANULOCYTES NFR BLD AUTO: 0.5 % (ref 0–0.5)
LYMPHOCYTES # BLD AUTO: 0.95 10*3/MM3 (ref 0.7–3.1)
LYMPHOCYTES NFR BLD AUTO: 9.7 % (ref 19.6–45.3)
MCH RBC QN AUTO: 31.2 PG (ref 26.6–33)
MCHC RBC AUTO-ENTMCNC: 33.1 G/DL (ref 31.5–35.7)
MCV RBC AUTO: 94.1 FL (ref 79–97)
MONOCYTES # BLD AUTO: 1.29 10*3/MM3 (ref 0.1–0.9)
MONOCYTES NFR BLD AUTO: 13.2 % (ref 5–12)
NEUTROPHILS NFR BLD AUTO: 7.43 10*3/MM3 (ref 1.7–7)
NEUTROPHILS NFR BLD AUTO: 76.1 % (ref 42.7–76)
NRBC BLD AUTO-RTO: 0 /100 WBC (ref 0–0.2)
PLATELET # BLD AUTO: 361 10*3/MM3 (ref 140–450)
PMV BLD AUTO: 8.9 FL (ref 6–12)
POTASSIUM SERPL-SCNC: 3.4 MMOL/L (ref 3.5–5.2)
RBC # BLD AUTO: 3.21 10*6/MM3 (ref 4.14–5.8)
SODIUM SERPL-SCNC: 134 MMOL/L (ref 136–145)
WBC # BLD AUTO: 9.77 10*3/MM3 (ref 3.4–10.8)

## 2021-04-30 PROCEDURE — 80048 BASIC METABOLIC PNL TOTAL CA: CPT | Performed by: INTERNAL MEDICINE

## 2021-04-30 PROCEDURE — 85025 COMPLETE CBC W/AUTO DIFF WBC: CPT | Performed by: INTERNAL MEDICINE

## 2021-04-30 PROCEDURE — 97116 GAIT TRAINING THERAPY: CPT

## 2021-04-30 PROCEDURE — 97110 THERAPEUTIC EXERCISES: CPT

## 2021-04-30 PROCEDURE — 97530 THERAPEUTIC ACTIVITIES: CPT

## 2021-04-30 PROCEDURE — 97535 SELF CARE MNGMENT TRAINING: CPT

## 2021-04-30 RX ADMIN — SODIUM CHLORIDE, PRESERVATIVE FREE 10 ML: 5 INJECTION INTRAVENOUS at 20:37

## 2021-04-30 RX ADMIN — ACETAMINOPHEN 650 MG: 325 TABLET, FILM COATED ORAL at 11:32

## 2021-04-30 RX ADMIN — DIGOXIN 125 MCG: 125 TABLET ORAL at 11:10

## 2021-04-30 RX ADMIN — NYSTATIN: 100000 POWDER TOPICAL at 08:11

## 2021-04-30 RX ADMIN — APIXABAN 5 MG: 5 TABLET, FILM COATED ORAL at 20:37

## 2021-04-30 RX ADMIN — SIMETHICONE 125 MG: 125 TABLET, CHEWABLE ORAL at 20:37

## 2021-04-30 RX ADMIN — BISOPROLOL FUMARATE 10 MG: 5 TABLET, COATED ORAL at 08:11

## 2021-04-30 RX ADMIN — SODIUM CHLORIDE, PRESERVATIVE FREE 10 ML: 5 INJECTION INTRAVENOUS at 08:12

## 2021-04-30 RX ADMIN — APIXABAN 5 MG: 5 TABLET, FILM COATED ORAL at 08:11

## 2021-04-30 RX ADMIN — HYDROCHLOROTHIAZIDE 12.5 MG: 12.5 TABLET ORAL at 08:11

## 2021-04-30 RX ADMIN — SIMETHICONE 125 MG: 125 TABLET, CHEWABLE ORAL at 08:11

## 2021-04-30 RX ADMIN — ISOSORBIDE MONONITRATE 60 MG: 60 TABLET, EXTENDED RELEASE ORAL at 08:11

## 2021-04-30 RX ADMIN — NYSTATIN 1 APPLICATION: 100000 POWDER TOPICAL at 23:30

## 2021-04-30 RX ADMIN — Medication 1 TABLET: at 08:11

## 2021-04-30 RX ADMIN — SIMETHICONE 125 MG: 125 TABLET, CHEWABLE ORAL at 11:10

## 2021-04-30 RX ADMIN — PRAVASTATIN SODIUM 40 MG: 40 TABLET ORAL at 08:11

## 2021-04-30 RX ADMIN — SIMETHICONE 125 MG: 125 TABLET, CHEWABLE ORAL at 17:09

## 2021-05-01 VITALS
WEIGHT: 190.2 LBS | SYSTOLIC BLOOD PRESSURE: 120 MMHG | RESPIRATION RATE: 18 BRPM | DIASTOLIC BLOOD PRESSURE: 75 MMHG | BODY MASS INDEX: 26.63 KG/M2 | HEART RATE: 94 BPM | OXYGEN SATURATION: 97 % | HEIGHT: 71 IN | TEMPERATURE: 98.1 F

## 2021-05-01 PROBLEM — I65.23 BILATERAL CAROTID ARTERY STENOSIS: Status: ACTIVE | Noted: 2021-05-01

## 2021-05-01 PROBLEM — I73.9 PVD (PERIPHERAL VASCULAR DISEASE) (HCC): Status: ACTIVE | Noted: 2021-05-01

## 2021-05-01 PROBLEM — E66.3 OVERWEIGHT WITH BODY MASS INDEX (BMI) OF 26 TO 26.9 IN ADULT: Status: ACTIVE | Noted: 2021-05-01

## 2021-05-01 LAB
ANION GAP SERPL CALCULATED.3IONS-SCNC: 8 MMOL/L (ref 5–15)
BASOPHILS # BLD AUTO: 0.03 10*3/MM3 (ref 0–0.2)
BASOPHILS NFR BLD AUTO: 0.3 % (ref 0–1.5)
BUN SERPL-MCNC: 21 MG/DL (ref 8–23)
BUN/CREAT SERPL: 20.8 (ref 7–25)
CALCIUM SPEC-SCNC: 8.3 MG/DL (ref 8.2–9.6)
CHLORIDE SERPL-SCNC: 97 MMOL/L (ref 98–107)
CO2 SERPL-SCNC: 29 MMOL/L (ref 22–29)
CREAT SERPL-MCNC: 1.01 MG/DL (ref 0.76–1.27)
DEPRECATED RDW RBC AUTO: 50.8 FL (ref 37–54)
EOSINOPHIL # BLD AUTO: 0.09 10*3/MM3 (ref 0–0.4)
EOSINOPHIL NFR BLD AUTO: 1 % (ref 0.3–6.2)
ERYTHROCYTE [DISTWIDTH] IN BLOOD BY AUTOMATED COUNT: 15 % (ref 12.3–15.4)
GFR SERPL CREATININE-BSD FRML MDRD: 69 ML/MIN/1.73
GLUCOSE SERPL-MCNC: 151 MG/DL (ref 65–99)
HCT VFR BLD AUTO: 27.6 % (ref 37.5–51)
HGB BLD-MCNC: 9.2 G/DL (ref 13–17.7)
IMM GRANULOCYTES # BLD AUTO: 0.04 10*3/MM3 (ref 0–0.05)
IMM GRANULOCYTES NFR BLD AUTO: 0.5 % (ref 0–0.5)
LYMPHOCYTES # BLD AUTO: 1.03 10*3/MM3 (ref 0.7–3.1)
LYMPHOCYTES NFR BLD AUTO: 11.8 % (ref 19.6–45.3)
MCH RBC QN AUTO: 31.2 PG (ref 26.6–33)
MCHC RBC AUTO-ENTMCNC: 33.3 G/DL (ref 31.5–35.7)
MCV RBC AUTO: 93.6 FL (ref 79–97)
MONOCYTES # BLD AUTO: 0.67 10*3/MM3 (ref 0.1–0.9)
MONOCYTES NFR BLD AUTO: 7.7 % (ref 5–12)
NEUTROPHILS NFR BLD AUTO: 6.84 10*3/MM3 (ref 1.7–7)
NEUTROPHILS NFR BLD AUTO: 78.7 % (ref 42.7–76)
NRBC BLD AUTO-RTO: 0 /100 WBC (ref 0–0.2)
PLATELET # BLD AUTO: 356 10*3/MM3 (ref 140–450)
PMV BLD AUTO: 9 FL (ref 6–12)
POTASSIUM SERPL-SCNC: 3.3 MMOL/L (ref 3.5–5.2)
RBC # BLD AUTO: 2.95 10*6/MM3 (ref 4.14–5.8)
SODIUM SERPL-SCNC: 134 MMOL/L (ref 136–145)
WBC # BLD AUTO: 8.7 10*3/MM3 (ref 3.4–10.8)

## 2021-05-01 PROCEDURE — 85025 COMPLETE CBC W/AUTO DIFF WBC: CPT | Performed by: INTERNAL MEDICINE

## 2021-05-01 PROCEDURE — 97116 GAIT TRAINING THERAPY: CPT

## 2021-05-01 PROCEDURE — 97535 SELF CARE MNGMENT TRAINING: CPT

## 2021-05-01 PROCEDURE — 97530 THERAPEUTIC ACTIVITIES: CPT

## 2021-05-01 PROCEDURE — 80048 BASIC METABOLIC PNL TOTAL CA: CPT | Performed by: INTERNAL MEDICINE

## 2021-05-01 RX ADMIN — Medication 1 TABLET: at 08:09

## 2021-05-01 RX ADMIN — APIXABAN 5 MG: 5 TABLET, FILM COATED ORAL at 08:10

## 2021-05-01 RX ADMIN — SIMETHICONE 125 MG: 125 TABLET, CHEWABLE ORAL at 08:09

## 2021-05-01 RX ADMIN — SODIUM CHLORIDE, PRESERVATIVE FREE 10 ML: 5 INJECTION INTRAVENOUS at 08:10

## 2021-05-01 RX ADMIN — HYDROCHLOROTHIAZIDE 12.5 MG: 12.5 TABLET ORAL at 08:10

## 2021-05-01 RX ADMIN — ACETAMINOPHEN 650 MG: 325 TABLET, FILM COATED ORAL at 12:00

## 2021-05-01 RX ADMIN — PRAVASTATIN SODIUM 40 MG: 40 TABLET ORAL at 08:09

## 2021-05-01 RX ADMIN — NYSTATIN: 100000 POWDER TOPICAL at 08:10

## 2021-05-01 RX ADMIN — BISOPROLOL FUMARATE 10 MG: 5 TABLET, COATED ORAL at 08:09

## 2021-05-01 RX ADMIN — SIMETHICONE 125 MG: 125 TABLET, CHEWABLE ORAL at 11:34

## 2021-05-01 RX ADMIN — DIGOXIN 125 MCG: 125 TABLET ORAL at 11:34

## 2021-05-01 RX ADMIN — ISOSORBIDE MONONITRATE 60 MG: 60 TABLET, EXTENDED RELEASE ORAL at 08:10

## 2021-05-01 NOTE — PLAN OF CARE
Problem: Fall Injury Risk  Goal: Absence of Fall and Fall-Related Injury  Outcome: Ongoing, Progressing   Goal Outcome Evaluation:     Progress: no change  Outcome Summary: VSS. Lot of bruising, especially left side of body. Ace wrap from ankle to hip. Denies pain. Very pleasant. Will continue to follow.

## 2021-05-01 NOTE — PLAN OF CARE
Goal Outcome Evaluation:         Pt nichole tx well with excellent participation. Pt t/f sup-sit-sup with SBAx1 with HOB down and no bedrail. Sit-stand-sit with Min Ax1. Pt amb 116', 8', 6', 12' with FWRW with SBA/CGAx1. Pt amb up/down 3 steps x2 with single point use of handrail to simulate a grab bar that pt has at home with SBA/CGAx1. Pt had BM and req assistance for pericare and to sit and stand from toilet-pt will benefit from 3-in-1 toilet seat along with FWRW. Pt will benefit from home with HHPT and assist. Pt reports good understanding of HEP issued along with home safety. No goals met at this time, but pt is making good progress.

## 2021-05-01 NOTE — THERAPY TREATMENT NOTE
Acute Care - Physical Therapy Treatment Note  [facility]     Patient Name: Josefina Stephens Jr.  : 1926  MRN: [id number]  Today's Date: 2021           PT Assessment (last 12 hours)      PT Evaluation and Treatment     Row Name 21          Physical Therapy Time and Intention    Subjective Information  no complaints  -EM     Document Type  therapy note (daily note)  -EM     Mode of Treatment  individual therapy;physical therapy  -EM     Patient Effort  excellent  -EM     Row Name 21          Cognition    Affect/Mental Status (Cognitive)  WNL  -EM     Orientation Status (Cognition)  oriented x 4  -EM     Follows Commands (Cognition)  WFL  -EM     Cognitive Function (Cognitive)  WFL  -EM     Personal Safety Interventions  fall prevention program maintained;gait belt;nonskid shoes/slippers when out of bed;supervised activity;toileting scheduled  -EM     Row Name 21          Pain Scale: Numbers Pre/Post-Treatment    Pretreatment Pain Rating  0/10 - no pain  -EM     Posttreatment Pain Rating  0/10 - no pain  -EM     Row Name 21          Bed Mobility    Supine-Sit [place] (Bed Mobility)  supervision  -EM     Sit-Supine [place] (Bed Mobility)  supervision  -EM     Comment (Bed Mobility)  HOB down no bedrail  -EM     Row Name 21          Transfers    Sit-Stand [place] (Transfers)  minimum assist (75% patient effort)  -EM     Stand-Sit [place] (Transfers)  minimum assist (75% patient effort)  -EM     [place] Level (Toilet Transfer)  minimum assist (75% patient effort) difficult sit-stand-sit from toilet,benefit 3-in-1 toilet   -EM     Assistive Device (Toilet Transfer)  walker, front-wheeled  -EM     Row Name 21          Sit-Stand Transfer    Assistive Device (Sit-Stand Transfers)  walker, front-wheeled  -EM     Row Name 21          Stand-Sit Transfer    Assistive Device (Stand-Sit Transfers)  walker,  front-wheeled  -EM     Row Name 05/01/21 0903          Gait/Stairs (Locomotion)    [place] Level (Gait)  standby assist;contact guard  -EM     Assistive Device (Gait)  walker, front-wheeled  -EM     Distance in Feet (Gait)  6',116', 8', 12'  -EM     Deviations/Abnormal Patterns (Gait)  antalgic;base of support, wide;nemesio decreased;gait speed decreased;stride length decreased  -EM     [place] Level (Stairs)  supervision;contact guard  -EM     Handrail Location (Stairs)  right side (ascending) one spot use of HR to simulate grab bar  -EM     Number of Steps (Stairs)  3 steps x2  -EM     Ascending Technique (Stairs)  step-to-step  -EM     Descending Technique (Stairs)  step-to-step  -EM     Row Name             Wound 04/21/21 Left    Wound - Properties Group Placement Date: 04/21/21  -RE Side: Left  -RE, dressing placed per  wound from fall     Retired Wound - Properties Group Date first assessed: 04/21/21  -RE Side: Left  -RE, dressing placed per  wound from fall     Row Name 05/01/21 0903          Vital Signs    Pre Systolic BP Rehab  152  -EM     Pre Treatment Diastolic BP  72  -EM     Intra Systolic BP Rehab  132  -EM     Intra Treatment Diastolic BP  96  -EM     Post Systolic BP Rehab  116  -EM     Post Treatment Diastolic BP  58  -EM     Pretreatment Heart Rate (beats/min)  102  -EM     Intratreatment Heart Rate (beats/min)  99  -EM     Posttreatment Heart Rate (beats/min)  112  -EM     Pre SpO2 (%)  97  -EM     O2 Delivery Pre Treatment  room air  -EM     Intra SpO2 (%)  96  -EM     O2 Delivery Intra Treatment  room air  -EM     Post SpO2 (%)  99  -EM     O2 Delivery Post Treatment  room air  -EM     Pre Patient Position  Sitting  -EM     Intra Patient Position  Sitting  -EM     Post Patient Position  Sitting  -EM     Row Name 05/01/21 0903          Bed Mobility Goal 1 (PT)    Activity/Assistive Device (Bed Mobility Goal 1, PT)  bed mobility activities, all  -EM     [place] Level/Cues  Needed (Bed Mobility Goal 1, PT)  independent;modified independence  -EM     Time Frame (Bed Mobility Goal 1, PT)  5 days  -EM     Progress/Outcomes (Bed Mobility Goal 1, PT)  goal not met  -EM     Row Name 05/01/21 0903          Transfer Goal 1 (PT)    Activity/Assistive Device (Transfer Goal 1, PT)  bed-to-chair/chair-to-bed  -EM     [place] Level/Cues Needed (Transfer Goal 1, PT)  modified independence  -EM     Time Frame (Transfer Goal 1, PT)  5 days  -EM     Progress/Outcome (Transfer Goal 1, PT)  goal not met  -EM     Row Name 05/01/21 0903          Gait Training Goal 1 (PT)    Activity/Assistive Device (Gait Training Goal 1, PT)  gait (walking locomotion);assistive device use;decrease fall risk  -EM     [place] Level (Gait Training Goal 1, PT)  modified independence  -EM     Distance (Gait Training Goal 1, PT)  300 ft x 2 or more w/out LOB/SOA  -EM     Time Frame (Gait Training Goal 1, PT)  by discharge  -EM     Progress/Outcome (Gait Training Goal 1, PT)  goal not met  -EM     Row Name 05/01/21 0903          Stairs Goal 1 (PT)    Activity/Assistive Device (Stairs Goal 1, PT)  ascending stairs;descending stairs  -EM     [place] Level/Cues Needed (Stairs Goal 1, PT)  modified independence  -EM     Number of Stairs (Stairs Goal 1, PT)  1 or more  -EM     Time Frame (Stairs Goal 1, PT)  by discharge  -EM     Progress/Outcome (Stairs Goal 1, PT)  goal not met  -EM     Row Name 05/01/21 0903          Positioning and Restraints    Pre-Treatment Position  sitting in chair/recliner  -EM     Post Treatment Position  chair  -EM     In Chair  sitting;call light within reach;encouraged to call for assist;exit alarm on  -EM       User Key  (r) = Recorded By, (t) = Taken By, (c) = Cosigned By    Initials Name Provider Type    EM Sascha Dela Cruz PTA Physical Therapy Assistant    Ariadne Shrestha, RN Registered Nurse        Physical Therapy Education                 Title: PT OT SLP Therapies (Resolved)      Topic: Physical Therapy (Resolved)     Point: Mobility training (Resolved)     Learning Progress Summary           Patient Acceptance, D,E, VU,NR,DU by  at 4/24/2021 1506    Comment: POC; Mobility w/ FWRW and LLE WBAT;                   Point: Home exercise program (Resolved)     Learning Progress Summary           Patient Acceptance, D,E, VU,NR,DU by  at 4/24/2021 1506    Comment: POC; Mobility w/ FWRW and LLE WBAT;                   Point: Body mechanics (Resolved)     Learning Progress Summary           Patient Acceptance, D,E, VU,NR,DU by  at 4/24/2021 1506    Comment: POC; Mobility w/ FWRW and LLE WBAT;                   Point: Precautions (Resolved)     Learning Progress Summary           Patient Acceptance, D,E, VU,NR,DU by  at 4/24/2021 1506    Comment: POC; Mobility w/ FWRW and LLE WBAT;                               User Key     Initials Effective Dates Name Provider Type Discipline     04/03/18 -  Cherie Moore, PT Physical Therapist PT              PT Recommendation and Plan  Anticipated Discharge Disposition (PT): home with assist, home with home health     Outcome Measures     Row Name 05/01/21 0903             How much help from another person do you currently need...    Turning from your back to your side while in flat bed without using bedrails?  3  -EM      Moving from lying on back to sitting on the side of a flat bed without bedrails?  3  -EM      Moving to and from a bed to a chair (including a wheelchair)?  3  -EM      Standing up from a chair using your arms (e.g., wheelchair, bedside chair)?  3  -EM      Climbing 3-5 steps with a railing?  3  -EM      To walk in hospital room?  3  -EM      AM-PAC 6 Clicks Score (PT)  18  -EM         Functional Assessment    Outcome Measure Options  AM-PAC 6 Clicks Daily Activity (OT)  -EM        User Key  (r) = Recorded By, (t) = Taken By, (c) = Cosigned By    Initials Name Provider Type    EM Sascha Dela Cruz, PTA Physical Therapy  Assistant           Time Calculation:   PT Charges     Row Name 05/01/21 1021             Time Calculation    Start Time  0903  -EM      Stop Time  1005  -EM      Time Calculation (min)  62 min  -EM         Time Calculation- PT    Total Timed Code Minutes- PT  62 minute(s)  -EM        User Key  (r) = Recorded By, (t) = Taken By, (c) = Cosigned By    Initials Name Provider Type    EM Sascha Dela Cruz PTA Physical Therapy Assistant        Therapy Charges for Today     Code Description Service Date Service Provider Modifiers Qty    86842097092 HC PT SELF CARE/MGMT/TRAIN EA 15 MIN 5/1/2021 Sascha Dela Cruz, NICK GP 1    65481325138 HC GAIT TRAINING EA 15 MIN 5/1/2021 Sascha Dela Cruz, NICK GP 1    82011490401 HC PT THERAPEUTIC ACT EA 15 MIN 5/1/2021 Sascha Dela Cruz, NICK GP 2          PT G-Codes  Outcome Measure Options: AM-PAC 6 Clicks Daily Activity (OT)  AM-PAC 6 Clicks Score (PT): 18  AM-PAC 6 Clicks Score (OT): 16    Sascha Dela Cruz PTA  5/1/2021

## 2021-05-01 NOTE — DISCHARGE SUMMARY
[facility]  DISCHARGE SUMMARY       Date of Admission: 4/21/2021  Date of Discharge:  5/1/2021  Primary Care Physician: Leticia Field APRN    Presenting Problem/History of Present Illness:  Sepsis (CMS/HCC) [A41.9]  Syncope and collapse [R55]       Final Discharge Diagnoses:  Active Hospital Problems    Diagnosis    • **Hematoma of left lower extremity    • Syncope and collapse    • Hemorrhagic shock (CMS/[facility])    • Atrial fibrillation (CMS/[facility])        Consults:   Consults     Date and Time Order Name Status Description    4/22/2021 11:56 AM Inpatient Cardiology Consult Completed     4/21/2021  5:18 PM Orthopedics (on-call MD unless specified)      4/21/2021 12:25 PM Hospitalist (on-call MD unless specified)            Pertinent Test Results:   Lab Results (most recent)     Procedure Component Value Units Date/Time    Basic Metabolic Panel [[id number]]  (Abnormal) Collected: 05/01/21 0519    Specimen: Blood Updated: 05/01/21 0623     Glucose 151 mg/dL      BUN 21 mg/dL      Creatinine 1.01 mg/dL      Sodium 134 mmol/L      Potassium 3.3 mmol/L      Chloride 97 mmol/L      CO2 29.0 mmol/L      Calcium 8.3 mg/dL      eGFR Non African Amer 69 mL/min/1.73      BUN/Creatinine Ratio 20.8     Anion Gap 8.0 mmol/L     Narrative:      GFR Normal >60  Chronic Kidney Disease <60  Kidney Failure <15      CBC & Differential [[id number]]  (Abnormal) Collected: 05/01/21 0519    Specimen: Blood Updated: 05/01/21 0559    Narrative:      The following orders were created for panel order CBC & Differential.  Procedure                               Abnormality         Status                     ---------                               -----------         ------                     CBC Auto Differential[[id number]]        Abnormal            Final result                 Please view results for these tests on the individual orders.    CBC Auto Differential [[id number]]  (Abnormal)  Collected: 05/01/21 0519    Specimen: Blood Updated: 05/01/21 0559     WBC 8.70 10*3/mm3      RBC 2.95 10*6/mm3      Hemoglobin 9.2 g/dL      Hematocrit 27.6 %      MCV 93.6 fL      MCH 31.2 pg      MCHC 33.3 g/dL      RDW 15.0 %      RDW-SD 50.8 fl      MPV 9.0 fL      Platelets 356 10*3/mm3      Neutrophil % 78.7 %      Lymphocyte % 11.8 %      Monocyte % 7.7 %      Eosinophil % 1.0 %      Basophil % 0.3 %      Immature Grans % 0.5 %      Neutrophils, Absolute 6.84 10*3/mm3      Lymphocytes, Absolute 1.03 10*3/mm3      Monocytes, Absolute 0.67 10*3/mm3      Eosinophils, Absolute 0.09 10*3/mm3      Basophils, Absolute 0.03 10*3/mm3      Immature Grans, Absolute 0.04 10*3/mm3      nRBC 0.0 /100 WBC     Basic Metabolic Panel [[id number]]  (Abnormal) Collected: 04/30/21 0706    Specimen: Blood Updated: 04/30/21 0749     Glucose 134 mg/dL      BUN 20 mg/dL      Creatinine 1.02 mg/dL      Sodium 134 mmol/L      Potassium 3.4 mmol/L      Chloride 96 mmol/L      CO2 28.0 mmol/L      Calcium 8.9 mg/dL      eGFR Non African Amer 68 mL/min/1.73      BUN/Creatinine Ratio 19.6     Anion Gap 10.0 mmol/L     Narrative:      GFR Normal >60  Chronic Kidney Disease <60  Kidney Failure <15      CBC & Differential [[id number]]  (Abnormal) Collected: 04/30/21 0706    Specimen: Blood Updated: 04/30/21 0727    Narrative:      The following orders were created for panel order CBC & Differential.  Procedure                               Abnormality         Status                     ---------                               -----------         ------                     CBC Auto Differential[[id number]]        Abnormal            Final result                 Please view results for these tests on the individual orders.    CBC Auto Differential [[id number]]  (Abnormal) Collected: 04/30/21 0706    Specimen: Blood Updated: 04/30/21 0727     WBC 9.77 10*3/mm3      RBC 3.21 10*6/mm3      Hemoglobin 10.0 g/dL      Hematocrit 30.2 %      MCV 94.1 fL        MCH 31.2 pg      MCHC 33.1 g/dL      RDW 15.1 %      RDW-SD 51.8 fl      MPV 8.9 fL      Platelets 361 10*3/mm3      Neutrophil % 76.1 %      Lymphocyte % 9.7 %      Monocyte % 13.2 %      Eosinophil % 0.2 %      Basophil % 0.3 %      Immature Grans % 0.5 %      Neutrophils, Absolute 7.43 10*3/mm3      Lymphocytes, Absolute 0.95 10*3/mm3      Monocytes, Absolute 1.29 10*3/mm3      Eosinophils, Absolute 0.02 10*3/mm3      Basophils, Absolute 0.03 10*3/mm3      Immature Grans, Absolute 0.05 10*3/mm3      nRBC 0.0 /100 WBC     Urinalysis, Microscopic Only - Urine, Clean Catch [[id number]]  (Abnormal) Collected: 04/29/21 1643    Specimen: Urine, Clean Catch Updated: 04/29/21 1707     RBC, UA 0-2 /HPF      WBC, UA 0-2 /HPF      Bacteria, UA None Seen /HPF      Squamous Epithelial Cells, UA None Seen /HPF      Hyaline Casts, UA None Seen /LPF      Methodology Automated Microscopy    Urinalysis With Culture If Indicated - Urine, Clean Catch [[id number]]  (Abnormal) Collected: 04/29/21 1643    Specimen: Urine, Clean Catch Updated: 04/29/21 1707     Color, UA Yellow     Appearance, UA Clear     pH, UA 6.5     Specific Gravity, UA 1.019     Glucose, UA Negative     Ketones, UA Negative     Bilirubin, UA Small (1+)     Blood, UA Negative     Protein, UA 30 mg/dL (1+)     Leuk Esterase, UA Negative     Nitrite, UA Negative     Urobilinogen, UA 4.0 E.U./dL    Blood Culture - Blood, Arm, Left [[id number]] Collected: 04/21/21 1226    Specimen: Blood from Arm, Left Updated: 04/26/21 1245     Blood Culture No growth at 5 days    Blood Culture - Blood, Arm, Left [[id number]] Collected: 04/21/21 0944    Specimen: Blood from Arm, Left Updated: 04/26/21 0945     Blood Culture No growth at 5 days    Hemoglobin & Hematocrit, Blood [[id number]]  (Abnormal) Collected: 04/21/21 1545    Specimen: Blood Updated: 04/21/21 1607     Hemoglobin 8.7 g/dL      Hematocrit 25.8 %     POC Glucose Once [[id number]]  (Normal) Collected: 04/21/21 6424     Specimen: Blood Updated: 04/21/21 1400     Glucose 106 mg/dL      Comment: : [id number] SYLVIA DUVALLMeter ID: [id number]       Timed Lactic Acid, Reflex [[id number]]  (Abnormal) Collected: 04/21/21 1226    Specimen: Blood Updated: 04/21/21 1258     Lactate 3.7 mmol/L     Troponin [[id number]]  (Normal) Collected: 04/21/21 1226    Specimen: Blood Updated: 04/21/21 1255     Troponin T <0.010 ng/mL     Narrative:      Troponin T Reference Range:  <= 0.03 ng/mL-   Negative for AMI  >0.03 ng/mL-     Abnormal for myocardial necrosis.  Clinicians would have to utilize clinical acumen, EKG, Troponin and serial changes to determine if it is an Acute Myocardial Infarction or myocardial injury due to an underlying chronic condition.       Results may be falsely decreased if patient taking Biotin.      COVID-19 and FLU A/B PCR - Swab, Nasopharynx [[id number]]  (Normal) Collected: 04/21/21 1007    Specimen: Swab from Nasopharynx Updated: 04/21/21 1047     COVID19 Not Detected     Influenza A PCR Not Detected     Influenza B PCR Not Detected    Narrative:      Fact sheet for providers: https://www.fda.gov/media/161689/download    Fact sheet for patients: https://www.fda.gov/media/696453/download    Test performed by PCR.    Extra Tubes [[id number]] Collected: 04/21/21 0944    Specimen: Blood, Venous Line Updated: 04/21/21 1046    Narrative:      The following orders were created for panel order Extra Tubes.  Procedure                               Abnormality         Status                     ---------                               -----------         ------                     Lavender Top[[id number]]                                     Final result               Gold Top - [facility][[id number]]                                   Final result                 Please view results for these tests on the individual orders.    Lavender Top [[id number]] Collected: 04/21/21 0944    Specimen: Blood Updated: 04/21/21 1046     Extra Tube  hold for add-on     Comment: Auto resulted       Gold Top - SST [[id number]] Collected: 04/21/21 0944    Specimen: Blood Updated: 04/21/21 1045     Extra Tube Hold for add-ons.     Comment: Auto resulted.       CK [[id number]]  (Normal) Collected: 04/21/21 0944    Specimen: Blood Updated: 04/21/21 1034     Creatine Kinase 48 U/L     Urinalysis With Microscopic If Indicated (No Culture) - Urine, Catheter [[id number]]  (Abnormal) Collected: 04/21/21 1022    Specimen: Urine, Catheter Updated: 04/21/21 1030     Color, UA Yellow     Appearance, UA Clear     pH, UA 7.0     Specific Gravity, UA 1.015     Glucose, UA Negative     Ketones, UA Trace     Bilirubin, UA Negative     Blood, UA Negative     Protein, UA Negative     Leuk Esterase, UA Negative     Nitrite, UA Negative     Urobilinogen, UA 0.2 E.U./dL    Narrative:      Urine microscopic not indicated.    Comprehensive Metabolic Panel [[id number]]  (Abnormal) Collected: 04/21/21 0944    Specimen: Blood Updated: 04/21/21 1014     Glucose 232 mg/dL      BUN 27 mg/dL      Creatinine 1.64 mg/dL      Sodium 134 mmol/L      Potassium 4.7 mmol/L      Chloride 98 mmol/L      CO2 22.0 mmol/L      Calcium 8.6 mg/dL      Total Protein 6.1 g/dL      Albumin 3.40 g/dL      ALT (SGPT) 14 U/L      AST (SGOT) 18 U/L      Alkaline Phosphatase 38 U/L      Total Bilirubin 0.7 mg/dL      eGFR Non African Amer 39 mL/min/1.73      Globulin 2.7 gm/dL      A/G Ratio 1.3 g/dL      BUN/Creatinine Ratio 16.5     Anion Gap 14.0 mmol/L     Narrative:      GFR Normal >60  Chronic Kidney Disease <60  Kidney Failure <15      Magnesium [[id number]]  (Normal) Collected: 04/21/21 0944    Specimen: Blood Updated: 04/21/21 1014     Magnesium 1.8 mg/dL     Lactic Acid, Plasma [[id number]]  (Abnormal) Collected: 04/21/21 0944    Specimen: Blood Updated: 04/21/21 1011     Lactate 5.6 mmol/L     Troponin [[id number]]  (Normal) Collected: 04/21/21 0944    Specimen: Blood Updated: 04/21/21 1004     Troponin T <0.010  ng/mL     Narrative:      Troponin T Reference Range:  <= 0.03 ng/mL-   Negative for AMI  >0.03 ng/mL-     Abnormal for myocardial necrosis.  Clinicians would have to utilize clinical acumen, EKG, Troponin and serial changes to determine if it is an Acute Myocardial Infarction or myocardial injury due to an underlying chronic condition.       Results may be falsely decreased if patient taking Biotin.      BNP [[id number]]  (Normal) Collected: 04/21/21 0944    Specimen: Blood Updated: 04/21/21 1003     proBNP 1,380.0 pg/mL     Narrative:      Among patients with dyspnea, NT-proBNP is highly sensitive for the detection of acute congestive heart failure. In addition NT-proBNP of <300 pg/ml effectively rules out acute congestive heart failure with 99% negative predictive value.    Results may be falsely decreased if patient taking Biotin.      aPTT [[id number]]  (Normal) Collected: 04/21/21 0944    Specimen: Blood Updated: 04/21/21 0956     PTT 26.1 seconds     Narrative:      The recommended Heparin therapeutic range is 68-97 seconds.    Protime-INR [[id number]]  (Abnormal) Collected: 04/21/21 0944    Specimen: Blood Updated: 04/21/21 0956     Protime 20.9 Seconds      INR 1.70    Narrative:      Therapeutic range for most indications is 2.0-3.0 INR,  or 2.5-3.5 for mechanical heart valves.        Imaging Results (Most Recent)     Procedure Component Value Units Date/Time    CT Head Without Contrast [[id number]] Collected: 04/29/21 1710     Updated: 04/29/21 1810    Narrative:      Mental status change.    CT head without intravenous contrast.    Comparison is made with study dated April 21, 2021.    Radiation dose-limiting techniques also utilized, including  automated exposure control and adjustment of mA and/or KVP to the  patient size according to ALARA (as low as reasonably  achievable).    There is diffuse cerebral atrophy with very minimal dilatation of  ventricular system. No midline shift is identified. There  are  periventricular and white matter hypodensities. There is no  evidence of acute ischemia, intracranial hemorrhage or mass. No  acute abnormality is seen in the posterior fossa. No pituitary  lesion is identified.    The visualized sinuses and mastoid cells are clear. The calvarium  is intact.      Impression:      CONCLUSION:  1. No acute intracranial abnormality is identified.  2. Atrophy and microvascular changes.    Electronically signed by:  Zay Raphael MD  4/29/2021 6:09  PM CDT Workstation: [id number]    XR Shoulder 2+ View Right [[id number]] Collected: 04/29/21 1121     Updated: 04/29/21 1614    Narrative:      Procedure: Right    shoulder    INDICATION: Right shoulder pain   .    Technique: Three    views.    Prior examination: None.    FINDINGS:       1.Degenerative changes acromioclavicular joint. Prominent distal  clavicular spur projecting inferiorly. This may cause underlying  impingement.    2.There is remodeling of the inferior surface of the acromion and  upward positioning of the humerus with respect to the glenoid.  This suggests chronic rotator cuff disruption.    3.Soft tissue calcification adjacent to lateral aspect of the  humeral head suggesting calcific tendinitis.          Impression:      As above.    Electronically signed by:  Curt Paige MD  4/29/2021 4:13 PM CDT  Workstation: [id number]    XR Chest 1 View [[id number]] Collected: 04/25/21 1708     Updated: 04/25/21 1840    Narrative:      PROCEDURE: Single view upright AP portable chest x-ray at 5:08  PM.    INDICATION: SOB, R55 Syncope and collapse N17.9 Acute kidney  failure, unspecified T14.8XXA Other injury of unspecified body  region, initial encounter Z74.09 Other reduced mobility Z78.9  Other specified health status Z74.09 Other reduced mobility    COMPARISON: 4/21/2021 chest x-ray.    FINDINGS: Moderate cardiomegaly.    Moderate pulmonary with new moderate vascular congestion. There  also may be developing edema with  increased interstitial markings  suspicious .  No pleural effusions.      Impression:      Cardiomegaly with new moderate vascular congestion and possible  early edema. No pleural effusion.    [id number]      Electronically signed by:  Andrew Buckley MD  4/25/2021 6:38  PM CDT Workstation: [id number]    CT Lower Extremity Left Without Contrast [[id number]] Collected: 04/21/21 1219     Updated: 04/21/21 1312    Narrative:      EXAM: CT LOWER EXTREMITY LEFT WO CONTRAST    DATE: 4/21/2021 12:19 PM CDT    TECHNIQUE: Axial images were obtained at 3 mm intervals from the  level of the left hip down to the left knee. Coronal and sagittal  reformatted images were obtained with bone window and soft tissue  window images.    This exam was performed according to our departmental  dose-optimization program, which includes automated exposure  control, adjustment of the mA and/or kV according to patient size  and/or use of iterative reconstruction technique.    CLINICAL INDICATION: leg pain    COMPARISON: Plain films earlier the same day.    FINDINGS: There is an extensive hematoma extending from the level  of the hip down to the knee - extending for length of 42.5 cm  with a width proximally of up to 13.2 cm in AP dimension of 5.5  cm. The CT attenuation value is indicative of acute hemorrhage  (mean 77 HU). The hematoma is anterior to the rectus femoris and  vastus medialis muscles resulting in extrinsic mass effect on the  underlying muscles. The epicenter of the hematoma is between the  superficial and deep fascial planes. There is diffuse  subcutaneous edema also noted.    The underlying bony structures are intact without evidence of an  acute fracture or focal osseous abnormality. Extensive vascular  calcifications are noted.    Radiopaque marker/seeds are noted in the region of the prostate  from prior radiation treatment. Postsurgical changes are noted in  the lower abdominal wall and left inguinal region. There is a  small  fatty containing left inguinal hernia.      Impression:        1. Extensive acute hematoma in the left anterior thigh as above.   2. No underlying fractures are identified.    A request has been sent for stat critical conference concerning  these findings.    Electronically signed by:  Asia Garber MD  4/21/2021 1:11 PM  CDT Workstation: [id number]    US Arterial Doppler Lower Extremity Left [[id number]] Collected: 04/21/21 1110     Updated: 04/21/21 1253    Narrative:      PROCEDURE: US LOWER EXTREMITY ARTERIES LIMITED/UNILATERAL/FOLLOW  UP    COMPARISON: No comparison    HISTORY: Left leg injury    FINDINGS:   Realtime grayscale, color flow and spectral analysis was  performed of the left leg arteries    Very limited exam due to a large hematoma seen in the left upper  leg beginning in the groin area extending down to the knee.  Unable to visualize the vascular structures deep to the hematoma.    Common femoral artery peak systolic velocity: 59.6  cm/sec,  triphasic  Profunda femoris and superficial femoral arteries are not  visualized.  Proximal popliteal artery peak systolic velocity: 37.9 cm/sec,  biphasic  Distal popliteal artery peak systolic velocity: 24.8 cm/sec,  biphasic    Peroneal artery peak systolic velocity: 46.2 cm/sec, biphasic  Posterior tibial artery peak systolic velocity: 38.3 cm/sec,  triphasic      Impression:      Limited arterial Doppler study of the left lower leg showing flow  in the left common femoral artery, popliteal artery, and in the  peroneal and posterior tibial arteries in the calf.   The superficial and deep femoral arteries of the left thigh  cannot be assessed due to a large hematoma.   Areas of triphasic flow are suspicious for mild peripheral  vascular disease.    Electronically signed by:  Rashel Parnell MD  4/21/2021 12:52 PM  CDT Workstation: [id number]    US Guided Vascular Access [[id number]] Collected: 04/21/21 1027     Updated: 04/21/21 1246    Narrative:       PROCEDURE: Ultrasound Guidance Vascular Access    CLINICAL INDICATION: access    FINDINGS:  Realtime ultrasound imaging was utilized to visualize needle  entry into the right basilic vein during midline catheter  placement and a permanent image was stored for permanent  recording and reporting.       Impression:      Imaging obtained during vascular access device placement under  ultrasound guidance as described above    Electronically signed by:  Rashel Parnell MD  4/21/2021 12:45 PM  CDT Workstation: [id number]    US Venous Doppler Lower Extremity Left (duplex) [[id number]] Collected: 04/21/21 1110     Updated: 04/21/21 1239    Narrative:      PROCEDURE: Left lower extremity venous duplex    COMPARISON: None    HISTORY: Left lower extremity pain and swelling    FINDINGS: Realtime grayscale and color-flow imaging was performed  of the left lower extremity.  Very limited exam due to a large hematoma seen in the left upper  leg beginning in the groin area extending down to the knee.  Unable to visualize the veins deep to the hematoma.  The visualized deep veins demonstrate normal compressibility,  respiratory variation and augmentation with distal compression.  No thrombus is identified.      Impression:      CONCLUSION:   Limited exam. No DVT is visualized.    Electronically signed by:  Rashel Parnell MD  4/21/2021 12:38 PM  CDT Workstation: [id number]    XR Knee 4+ View Left [[id number]] Collected: 04/21/21 1118     Updated: 04/21/21 1220    Narrative:      EXAM DESCRIPTION: LEFT KNEE 4 VIEWS    CLINICAL HISTORY: Leg injury .    COMPARISON: None.    FINDINGS:  AP, oblique, tunnel, and lateral projections of the left knee are  obtained.    The alignment is anatomic in the mineralization is considered  within limits of normal. No fracture, dislocation, or suspicious  osseous lesion identified. There is spurs projecting from the  anterior superior/inferior margin of the patella. Mild  degenerative changes with medial  joint space narrowing.  There is vascular calcification and soft tissue swelling present.  No large joint effusion identified.      Impression:      Soft tissue swelling without fracture or dislocation identified.    Electronically signed by:  Sandoval Ingram MD  4/21/2021 12:18 PM  CDT Workstation: [id number]    XR Hip With or Without Pelvis 2 - 3 View Left [[id number]] Collected: 04/21/21 1117     Updated: 04/21/21 1216    Narrative:      EXAM DESCRIPTION: X-RAY LEFT HIP TO INCLUDE AP PELVIS THREE VIEWS    CLINICAL HISTORY: Leg injury .    COMPARISON: None.    FINDINGS:  AP projection of the pelvis as well as frontal and frog-leg views  of the left hip are obtained.    The alignment and mineralization are considered normal. The  articular surfaces are smooth. No fracture, dislocation, or  suspicious osseous lesions seen.    No widening of the sacroiliac joints or the symphysis. Phlebolith  and vascular calcifications are present. Degenerative changes  within the lower lumbar spine. Postsurgical changes within the  soft tissue pelvis as well as radiation prostate seeds.      Impression:      No evidence of acute fracture or dislocation.    Electronically signed by:  Sandoval Ingram MD  4/21/2021 12:15 PM  CDT Workstation: [id number]    XR Femur 2 View Left [[id number]] Collected: 04/21/21 1117     Updated: 04/21/21 1213    Narrative:      EXAM DESCRIPTION: LEFT FEMUR TWO VIEWS    CLINICAL HISTORY: Leg swelling.    COMPARISON: None    FINDINGS:  Frontal and lateral projections of the left femur to  include the hip and knee joints are obtained.    There is anatomic alignment and mineralization is considered  within limits of normal. No fracture, dislocation, or suspicious  osseous lesion identified. The hip and knee joints are intact.  There is spurring projecting from the anterior superior/inferior  margin of the patella. There is mild soft tissue swelling at the  level of the knee. There is vascular calcification noted.  There  are surgical clips seen within the soft tissue pelvis region as  well as radiation prostate seeds.      Impression:      Negative for fracture or dislocation.    Electronically signed by:  Sandoval Ingram MD  4/21/2021 12:12 PM  CDT Workstation: [id number]    XR Tibia Fibula 2 View Left [[id number]] Collected: 04/21/21 1118     Updated: 04/21/21 1212    Narrative:        PROCEDURE: Left tibia and fibula 4 views    REASON FOR EXAM: leg injury    FINDINGS: Bony structures of the left tibia and fibula are  normal. There is no evidence of fracture or dislocation. Soft  tissue structures reveals atrophy of the left calf musculature.  Soft tissue structures otherwise unremarkable. No radiopaque  foreign body .      Impression:      1.  No acute left tibia or fibula abnormality.  2.  Soft tissue structures reveals atrophy of the left calf  musculature.     Electronically signed by:  Faisal Lopez MD  4/21/2021 12:11 PM CDT  Workstation: [id number]    IR Insert Midline Without Port Pump 5 Plus [[id number]] Resulted: 04/21/21 1055     Updated: 04/21/21 1055    Narrative:      This procedure was auto-finalized with no dictation required.    XR Chest 1 View [[id number]] Collected: 04/21/21 0949     Updated: 04/21/21 1020    Narrative:      PROCEDURE: Single view AP semierect portable chest x-ray at 9:24  AM.    INDICATION: Coronary artery disease.    COMPARISON: None.    Mild cardiomegaly with normal vascularity.  Bilateral low lung volumes.  Lungs clear with no acute infiltrate.  No pleural effusion.    Bony structures unremarkable.      Impression:      Bilateral low lung volumes with no acute disease.    Mild cardiomegaly.    [id number]    Electronically signed by:  Andrew Buckley MD  4/21/2021 10:19  AM CDT Workstation: [id number]    CT Head Without Contrast [[id number]] Collected: 04/21/21 0934     Updated: 04/21/21 0958    Narrative:      PROCEDURE: CT HEAD WITHOUT IV CONTRAST    CLINICAL HISTORY: fall;  seizures    INDICATION: Same as above    Comparison: None    TECHNIQUE:     CT of the head was done without intravenous contrast in  orthogonal planes.    This exam was performed according to the departmental  dose-optimization program which includes automated exposure  control, adjustment of the mA and/or kV according to patient size  and/or use of iterative reconstruction technique.      FINDINGS:     There is no intracranial hemorrhage, midline shift, mass effect  or acute focal infarct.    There is presence of chronic small vessel white matter ischemic  change in a periventricular distribution and extending into the  centrum semiovale region.     Age-appropriate cerebral and cerebellar atrophy is noted.     Intracranial atherosclerotic vascular wall calcifications are  seen.    There is no hydrocephalus.    The mastoid air cells are unremarkable.     The paranasal sinuses are unremarkable.    There is no visualization of acute displaced fractures involving  the calvarium or the skull base.       Impression:        There is no acute intracranial abnormality.    Age-related and chronic involutional changes are noted     If clinical concern exists regarding an acute ischemic/vascular  etiology being responsible for patient's symptomatology, an MRI  of the brain is more sensitive than the current study, in ruling  out such a possibility.      Electronically signed by:  Quinton Ramirez MD  4/21/2021 9:57 AM CDT  Workstation: [id number]          Chief Complaint on Day of Discharge: Improving left leg pain, right shoulder pain    Hospital Course:  The patient is a 89 y.o. male who presented to [facility] with syncope.  Patient was found to have hemorrhagic shock secondary to a large left lower extremity hematoma.  He was aggressively fluid resuscitated and transfused 2 units of packed red blood cells.  Orthopedics was consulted.  Dr. Mckenzie saw the patient and felt that he had no operative  "indication.  Patient leg was wrapped in Ace bandage.  Eliquis was held.  Cardiology was consulted.  They felt patient did not warrant anticoagulation when it was appropriate based on hematoma.  Patient continued to slowly improve with physical therapy and Occupational Therapy.  Eliquis was initially started back at half dose and monitor.  He did well with this and Eliquis was increased to full dose.  He did have some confusion and urinalysis was checked and was normal.  CT scan of the head was unremarkable for acute pathology.  He continued to work with therapy and was deemed safe to be discharged.  He was discharged with a 5 inch fixed wheel rolling walker and bedside commode.    Condition on Discharge: Stable    Physical Exam on Discharge:  /75 (BP Location: Left arm, Patient Position: Sitting)   Pulse 94   Temp 98.1 °F (36.7 °C) (Temporal)   Resp 18   Ht 180.3 cm (70.98\")   Wt 86.3 kg (190 lb 3.2 oz)   SpO2 97%   BMI 26.54 kg/m²      Physical Exam  Vitals reviewed.   Constitutional:       Appearance: He is well-developed.   HENT:      Head: Normocephalic and atraumatic.   Eyes:      Pupils: Pupils are equal, round, and reactive to light.   Cardiovascular:      Rate and Rhythm: Normal rate and regular rhythm.      Heart sounds: Normal heart sounds. No murmur heard.   No friction rub. No gallop.    Pulmonary:      Effort: Pulmonary effort is normal. No respiratory distress.      Breath sounds: Normal breath sounds. No wheezing or rales.   Chest:      Chest wall: No tenderness.   Abdominal:      General: Bowel sounds are normal. There is no distension.      Palpations: Abdomen is soft.      Tenderness: There is no abdominal tenderness.   Musculoskeletal:      Cervical back: Normal range of motion and neck supple.   Skin:     Comments: Large ecchymotic area left leg   Psychiatric:         Behavior: Behavior normal.     Discharge Disposition:  [place] [facility] [place] Medications:     Discharge " Medications      Continue These Medications      Instructions Start Date   amLODIPine 10 MG tablet  Commonly known as: NORVASC   10 mg, Oral, Daily      apixaban 5 MG tablet tablet  Commonly known as: ELIQUIS   5 mg, Oral      bisoprolol-hydrochlorothiazide 10-6.25 MG per tablet  Commonly known as: ZIAC   1 tablet, Oral, Daily      isosorbide mononitrate 60 MG 24 hr tablet  Commonly known as: IMDUR   60 mg, Oral, Daily      multivitamin with minerals tablet tablet   Oral      pravastatin 40 MG tablet  Commonly known as: PRAVACHOL   40 mg, Oral, Daily      simethicone 125 MG chewable tablet  Commonly known as: MYLICON  Notes to patient: As directed   125 mg, Oral      TYLENOL PO  Notes to patient: As directed for prn use   Oral, As Needed             Discharge Diet:   Diet Instructions     Advance Diet As Tolerated            Activity at Discharge:   Activity Instructions     Activity as Tolerated            Follow-up Appointments:   Future Appointments   Date Time Provider Department Center   5/25/2021 11:45 AM Govind Sweeney MD [facility] None   PCP in 1 to 2 weeks          This document has been electronically signed by Bebeto Puente MD on May 1, 2021 14:33 CDT      Time:  35 min

## 2021-05-01 NOTE — PROGRESS NOTES
3/22/2021    Josefina Stephens Jr.  12/17/1926    Chief Complaint:    Chief Complaint   Patient presents with   • Carotid Artery Disease   • Leg Pain       HPI:      PCP:  Leticia Field APRN  Cardiology:  Dr Sweeney, Dr Orosco([place])  Nephrology:  Dr Eubanks    89 y.o. male with HTN(stable, increased risk stroke, rupture), Hyperlipidemia(stable, increased risk cardiovascular events) and Obesity(uncontrolled, increased risk cardiovascular events) , Afib(chronic progression, increase risk stroke), carotid stenosis(new, increase risk stroke), PVD(new, increase risk cardiovascular events).  never smoked.  Moderate pain LEFT hip x years.  moderate pain RIGHT hip after sitting long time x years.  No claudication symptoms.. moderate fatigue.  Occasional feet ankle swelling..  No TIA stroke amaurosis.  No MI claudication. No other associated signs, symptoms or modifying factors.    3/2021 WILLIAM:  RIGHT .98 triphasic.  LEFT .94 triphasic.  3/2021 Carotid Duplex:  BENOIT 0-49% (64/19cm/s, ratio 1.5), LICA 0-49% (65/24cm/s, ratio 1.4) antegrade verts.    4/2021 ECG:  Afib 127, QTc 467    The following portions of the patient's history were reviewed and updated as appropriate: allergies, current medications, past family history, past medical history, past social history, past surgical history and problem list.  Recent images independently reviewed.  Available laboratory values reviewed.    PMH:  Past Medical History:   Diagnosis Date   • Arthritis    • Atrial fibrillation (CMS/HCC)    • Callus    • Duodenal ulcer     HX   • GERD (gastroesophageal reflux disease)    • GI (gastrointestinal bleed)     HX   • Gout    • Hyperlipidemia    • Hypertension    • Prostate cancer (CMS/HCC)    • Skin cancer    • Sleep apnea    • TIA (transient ischemic attack)      Past Surgical History:   Procedure Laterality Date   • COLONOSCOPY  1955   • COLONOSCOPY N/A 7/6/2017    Procedure: COLONOSCOPY to cecum ;  Surgeon: Faisal Mac MD;   Location: [facility] ENDOSCOPY;  Service:    • CORONARY ANGIOPLASTY WITH STENT PLACEMENT     • ENDOSCOPY N/A 7/6/2017    Procedure: ESOPHAGOGASTRODUODENOSCOPY with biopsies;  Surgeon: Faisal Mac MD;  Location: [facility] ENDOSCOPY;  Service:    • HERNIA REPAIR     • PROSTATE RADIOACTIVE SEED IMPLANT     • SKIN BIOPSY     • UPPER GASTROINTESTINAL ENDOSCOPY  11/12/2008    GI bleed, peptic ulcer disease, melena, treated w/heater probe     Family History   Problem Relation Age of Onset   • Heart disease Father      Social History     Tobacco Use   • Smoking status: Never Smoker   • Smokeless tobacco: Never Used   Vaping Use   • Vaping Use: Never assessed   Substance Use Topics   • Alcohol use: No   • Drug use: No       ALLERGIES:  Allergies   Allergen Reactions   • Lotrel [Amlodipine Besy-Benazepril Hcl] Swelling   • Aliskiren    • Colesevelam Diarrhea   • Contrast Dye    • Influenza Virus Vaccine Split    • Lipitor [Atorvastatin]    • Penicillins    • Ticlopidine Rash         MEDICATIONS:  No current facility-administered medications for this visit.  No current outpatient medications on file.    Facility-Administered Medications Ordered in Other Visits:   •  acetaminophen (TYLENOL) tablet 650 mg, 650 mg, Oral, Q4H PRN, Nitish Greenwood MD, 650 mg at 04/30/21 1132  •  apixaban (ELIQUIS) tablet 5 mg, 5 mg, Oral, Q12H, Bebeto Puente MD, 5 mg at 04/30/21 2037  •  bisoprolol (ZEBeta) tablet 10 mg, 10 mg, Oral, Q24H, Afsaneh Zavala APRN, 10 mg at 04/30/21 0811  •  digoxin (LANOXIN) tablet 125 mcg, 125 mcg, Oral, Daily, Govind Sweeney MD, 125 mcg at 04/30/21 1110  •  hydroCHLOROthiazide (HYDRODIURIL) tablet 12.5 mg, 12.5 mg, Oral, Daily, Glenys Turner APRN, 12.5 mg at 04/30/21 0811  •  ipratropium-albuterol (DUO-NEB) nebulizer solution 3 mL, 3 mL, Nebulization, Q6H PRN, Nitish Greenwood MD, 3 mL at 04/29/21 1906  •  isosorbide mononitrate (IMDUR) 24 hr tablet 60 mg, 60 mg, Oral, Daily, Nitish Greenwood,  MD, 60 mg at 21 0811  •  metoprolol tartrate (LOPRESSOR) injection 5 mg, 5 mg, Intravenous, Q6H PRN, Nitish Greenwood MD, 5 mg at 21 0542  •  metoprolol tartrate (LOPRESSOR) injection 5 mg, 5 mg, Intravenous, Q4H PRN, Afsaneh Zavala, APRN, 5 mg at 21 1607  •  multivitamin with minerals 1 tablet, 1 tablet, Oral, Daily, Nitish Greenwood MD, 1 tablet at 21 0811  •  [] morphine injection 1 mg, 1 mg, Intravenous, Q4H PRN **AND** naloxone (NARCAN) injection 0.4 mg, 0.4 mg, Intravenous, Q5 Min PRN, Nitish Greenwood MD  •  nystatin (MYCOSTATIN) powder, , Topical, Q12H, Ten Thomas MD, 1 application at 21 2330  •  ondansetron (ZOFRAN) injection 4 mg, 4 mg, Intravenous, Q6H PRN, Nitish Greenwood MD  •  pravastatin (PRAVACHOL) tablet 40 mg, 40 mg, Oral, Daily, Nitish Greenwood MD, 40 mg at 21  •  simethicone (MYLICON) chewable tablet 125 mg, 125 mg, Oral, 4x Daily AC & at Bedtime, Nitish Greenwood MD, 125 mg at 21  •  [COMPLETED] Insert peripheral IV, , , Once **AND** sodium chloride 0.9 % flush 10 mL, 10 mL, Intravenous, PRN, Nitish Greenwood MD  •  sodium chloride 0.9 % flush 10 mL, 10 mL, Intravenous, Q12H, Nitish Greenwood MD, 10 mL at 21  •  sodium chloride 0.9 % flush 10 mL, 10 mL, Intravenous, PRN, Nitish Greenwood MD    Review of Systems   Review of Systems   Constitutional: Positive for malaise/fatigue. Negative for weight loss.   Cardiovascular: Positive for leg swelling. Negative for chest pain, claudication and dyspnea on exertion.   Respiratory: Negative for cough and shortness of breath.    Skin: Negative for color change and poor wound healing.   Musculoskeletal: Positive for joint pain, muscle weakness and myalgias.   Neurological: Negative for dizziness, numbness and weakness.       Physical Exam   Vitals:    21 1117   BP: 154/88   BP Location: Left arm   Patient Position: Lying   Cuff Size: Adult   Pulse: 66   Temp: 98.1 °F (36.7 °C)  "  TempSrc: Temporal   SpO2: 98%   Weight: 88 kg (194 lb)   Height: 180.3 cm (71\")     Physical Exam  Constitutional:       General: He is not in acute distress.     Appearance: He is not ill-appearing.   HENT:      Right Ear: Hearing normal.      Left Ear: Hearing normal.      Nose: No nasal deformity.      Mouth/Throat:      Dentition: Normal dentition. Does not have dentures.   Cardiovascular:      Rate and Rhythm: Normal rate and regular rhythm.      Pulses:           Carotid pulses are 2+ on the right side and 2+ on the left side.       Radial pulses are 2+ on the right side and 2+ on the left side.        Dorsalis pedis pulses are 2+ on the right side and 2+ on the left side.        Posterior tibial pulses are 2+ on the right side and 2+ on the left side.      Heart sounds: No murmur heard.     Pulmonary:      Effort: Pulmonary effort is normal.      Breath sounds: Normal breath sounds.   Abdominal:      General: There is no distension.      Palpations: Abdomen is soft. There is no mass.      Tenderness: There is no abdominal tenderness.   Musculoskeletal:         General: No deformity.      Comments: Gait normal.    Skin:     General: Skin is warm and dry.      Coloration: Skin is not pale.      Findings: No erythema.      Comments: No venous staining   Neurological:      Mental Status: He is alert and oriented to person, place, and time.   Psychiatric:         Speech: Speech normal.         Behavior: Behavior is cooperative.         Thought Content: Thought content normal.         Judgment: Judgment normal.         BUN   Date Value Ref Range Status   05/01/2021 21 8 - 23 mg/dL Final   03/01/2018 18 7 - 20 mg/dL Final     Creatinine   Date Value Ref Range Status   05/01/2021 1.01 0.76 - 1.27 mg/dL Final   03/01/2018 1.1 0.7 - 1.5 mg/dL Final     eGFR Non  Amer   Date Value Ref Range Status   05/01/2021 69 >60 mL/min/1.73 Final     eGFR African Am   Date Value Ref Range Status   02/25/2019 63 >60 " mL/min/1.73 Final       ASSESSMENT:  Diagnoses and all orders for this visit:    1. PVD (peripheral vascular disease) (CMS/HCC) (Primary)    2. Mixed hyperlipidemia    3. Essential hypertension    4. Coronary artery disease involving native coronary artery of native heart without angina pectoris    5. Paroxysmal atrial fibrillation (CMS/HCC)    6. GALO on CPAP +9    7. TIA (transient ischemic attack)    8. Bilateral carotid artery stenosis    9. Overweight with body mass index (BMI) of 26 to 26.9 in adult    PLAN:  Detailed discussion with Josefina Stephens Jr. regarding situation and options.  Mild carotid stenosis, asymptomatic.  Mild PVD.  Symptoms appear unrelated to PVD, more likely arthritis.  Multiple risk factors with severe comorbidities.  No intervention indicated at this time.    Risks, benefits discussed.  Understands and wishes to proceed with plan.    Return as needed.  Obesity Class  0. Increased risk cardiovascular events, sleep and breathing disorders, joint issues, type 2 diabetes mellitus. Options for weight management, heart healthy diet, exercise programs, and associated health risks of obesity discussed.    Recommended regular physical activity, progressive walking program.  Continue current medications as directed.  Will Obtain relevant old records.  Advance Care Planning   ACP discussion was declined by the patient. Patient does not have an advance directive, declines further assistance.      Thank you for the opportunity to participate in this patient's care.    Copy to primary care provider.

## 2021-05-01 NOTE — THERAPY TREATMENT NOTE
Patient Name: Josefina Stephens Jr.  : 1926    MRN: [id number]                              Today's Date: 2021       Admit Date: 2021    Visit Dx:     ICD-10-CM ICD-9-CM   1. Syncope and collapse  R55 780.2   2. SEBASTIAN (acute kidney injury) (CMS/HCC)  N17.9 584.9   3. Hematoma and contusion  T14.8XXA 924.9   4. Impaired mobility and activities of daily living  Z74.09 V49.89    Z78.9    5. Impaired functional mobility, balance, gait, and endurance  Z74.09 V49.89     Patient Active Problem List   Diagnosis   • Hypertension   • Hyperlipidemia   • GERD (gastroesophageal reflux disease)   • Atrial fibrillation (CMS/HCC)   • GALO on CPAP +9   • Iron deficiency anemia due to chronic blood loss   • Coronary artery disease involving native coronary artery of native heart without angina pectoris   • TIA (transient ischemic attack)   • Sepsis (CMS/HCC)   • Hemorrhagic shock (CMS/HCC)   • Hematoma of left lower extremity   • Syncope and collapse   • Overweight with body mass index (BMI) of 26 to 26.9 in adult   • PVD (peripheral vascular disease) (CMS/HCC)   • Bilateral carotid artery stenosis     Past Medical History:   Diagnosis Date   • Arthritis    • Atrial fibrillation (CMS/HCC)    • Callus    • Duodenal ulcer     HX   • GERD (gastroesophageal reflux disease)    • GI (gastrointestinal bleed)     HX   • Gout    • Hyperlipidemia    • Hypertension    • Prostate cancer (CMS/HCC)    • Skin cancer    • Sleep apnea    • TIA (transient ischemic attack)      Past Surgical History:   Procedure Laterality Date   • COLONOSCOPY     • COLONOSCOPY N/A 2017    Procedure: COLONOSCOPY to cecum ;  Surgeon: Faisal Mac MD;  Location: [facility] ENDOSCOPY;  Service:    • CORONARY ANGIOPLASTY WITH STENT PLACEMENT     • ENDOSCOPY N/A 2017    Procedure: ESOPHAGOGASTRODUODENOSCOPY with biopsies;  Surgeon: Faisal Mac MD;  Location: [facility] ENDOSCOPY;  Service:    • HERNIA REPAIR     • PROSTATE RADIOACTIVE SEED  IMPLANT     • SKIN BIOPSY     • UPPER GASTROINTESTINAL ENDOSCOPY  11/12/2008    GI bleed, peptic ulcer disease, melena, treated w/heater probe     General Information     Row Name 05/01/21 0615          OT Time and Intention    Document Type  therapy note (daily note)  -BB     Mode of Treatment  individual therapy;occupational therapy  -BB     Row Name 05/01/21 0615          General Information    Patient Profile Reviewed  yes  -BB     Existing Precautions/Restrictions  fall avoid bumping limb diue to hematoma L thigh  -BB     Row Name 05/01/21 0615          Cognition    Orientation Status (Cognition)  oriented x 4  -BB     Row Name 05/01/21 0615          Safety Issues, Functional Mobility    Impairments Affecting Function (Mobility)  balance;endurance/activity tolerance;strength  -BB       User Key  (r) = Recorded By, (t) = Taken By, (c) = Cosigned By    Initials Name Provider Type    BB Elissa Wheeler COTA/L Occupational Therapy Assistant          Mobility/ADL's     Row Name 05/01/21 0615          Bed Mobility    Bed Mobility  supine-sit;sit-supine  -BB     Scooting/Bridging [place] (Bed Mobility)  -- to scoot to eob  -BB     Supine-Sit [place] (Bed Mobility)  supervision  -BB     Sit-Supine [place] (Bed Mobility)  supervision  -BB     Assistive Device (Bed Mobility)  bed rails;head of bed elevated  -BB     Row Name 05/01/21 0615          Transfers    Transfers  toilet transfer  -BB     Sit-Stand [place] (Transfers)  minimum assist (75% patient effort)  -BB     [place] Level (Toilet Transfer)  minimum assist (75% patient effort) difficult sit-stand-sit from toilet,benefit 3-in-1 toilet   -BB     Assistive Device (Toilet Transfer)  walker, front-wheeled  -BB     Row Name 05/01/21 0615          Sit-Stand Transfer    Assistive Device (Sit-Stand Transfers)  walker, front-wheeled  -BB     Row Name 05/01/21 0615          Toilet Transfer    Type (Toilet Transfer)  sit-stand;stand-sit  -BB      Row Name 05/01/21 0615          Functional Mobility    Functional Mobility- Ind. Level  contact guard assist  -BB     Functional Mobility- Device  rolling walker  -BB     Row Name 05/01/21 0615          Mobility    Extremity Weight-bearing Status  left lower extremity  -BB     Left Lower Extremity (Weight-bearing Status)  weight-bearing as tolerated (WBAT)  -BB     Row Name 05/01/21 0615          Lower Body Dressing Assessment/Training    [place] Level (Lower Body Dressing)  doff;don;socks;maximum assist (25% patient effort) due to pain in R shld  -BB     Position (Lower Body Dressing)  supported sitting  -BB     Row Name 05/01/21 0615          Grooming Assessment/Training    [place] Level (Grooming)  grooming skills;wash face, hands;standby assist;set up  -BB     Position (Grooming)  supported sitting  -BB     Row Name 05/01/21 0615          Bathing Assessment/Intervention    [place] Level (Bathing)  -- Pt. w/ c/o pain 10/10 w/ R shld. Pt required assiatnce w/ UB bathing/dressing 2' pain w/ movement  -BB       User Key  (r) = Recorded By, (t) = Taken By, (c) = Cosigned By    Initials Name Provider Type    Elissa Corrales COTA/L Occupational Therapy Assistant        Obj/Interventions     Row Name 05/01/21 0615          Vision Assessment/Intervention    Visual Impairment/Limitations  corrective lenses full-time  -BB     Row Name 05/01/21 0615          Range of Motion Comprehensive    General Range of Motion  no range of motion deficits identified  -BB       User Key  (r) = Recorded By, (t) = Taken By, (c) = Cosigned By    Initials Name Provider Type    Elissa Corrales COTA/L Occupational Therapy Assistant        Goals/Plan     Row Name 05/01/21 0615          Bed Mobility Goal 1 (OT)    Activity/Assistive Device (Bed Mobility Goal 1, OT)  sit to supine/supine to sit  -BB     [place] Level/Cues Needed (Bed Mobility Goal 1, OT)  independent  -BB     Time Frame (Bed Mobility Goal 1,  OT)  long term goal (LTG);by discharge  -BB     Progress/Outcomes (Bed Mobility Goal 1, OT)  goal not met  -BB     Row Name 05/01/21 0615          Transfer Goal 1 (OT)    Activity/Assistive Device (Transfer Goal 1, OT)  sit-to-stand/stand-to-sit;toilet  -BB     [place] Level/Cues Needed (Transfer Goal 1, OT)  standby assist;verbal cues required;tactile cues required  -BB     Time Frame (Transfer Goal 1, OT)  long term goal (LTG);by discharge  -BB     Progress/Outcome (Transfer Goal 1, OT)  goal not met  -BB     Row Name 05/01/21 0615          Bathing Goal 1 (OT)    Activity/Device (Bathing Goal 1, OT)  lower body bathing  -BB     [place] Level/Cues Needed (Bathing Goal 1, OT)  standby assist;verbal cues required;tactile cues required  -BB     Time Frame (Bathing Goal 1, OT)  long term goal (LTG);by discharge  -BB     Progress/Outcomes (Bathing Goal 1, OT)  goal not met  -BB     Row Name 05/01/21 0615          Dressing Goal 1 (OT)    Activity/Device (Dressing Goal 1, OT)  lower body dressing  -BB     [place]/Cues Needed (Dressing Goal 1, OT)  standby assist;tactile cues required;verbal cues required  -BB     Time Frame (Dressing Goal 1, OT)  long term goal (LTG);by discharge  -BB     Progress/Outcome (Dressing Goal 1, OT)  goal not met  -BB     Row Name 05/01/21 0615          Toileting Goal 1 (OT)    Activity/Device (Toileting Goal 1, OT)  toileting skills, all  -BB     [place] Level/Cues Needed (Toileting Goal 1, OT)  standby assist;verbal cues required;tactile cues required  -BB     Time Frame (Toileting Goal 1, OT)  long term goal (LTG);by discharge  -BB     Progress/Outcome (Toileting Goal 1, OT)  goal not met  -BB       User Key  (r) = Recorded By, (t) = Taken By, (c) = Cosigned By    Initials Name Provider Type    BB Elissa Wheeler COTA/L Occupational Therapy Assistant        Clinical Impression     Row Name 05/01/21 0615          Pain Scale: Numbers Pre/Post-Treatment    Pretreatment  Pain Rating  0/10 - no pain  -BB     Posttreatment Pain Rating  0/10 - no pain  -BB     Row Name 05/01/21 0615          Plan of Care Review    Plan of Care Reviewed With  patient  -BB     Progress  no change  -BB     Outcome Summary  Pt educated on home safety/fall prevention. Pt is Min A for supine<>sit and Min A for bed<>toilet t/f using RW. Pt is Max A to doff/don socks. Pt will likely benefit from HHOT at time of D/C. Continue OT POC  -BB     Row Name 05/01/21 0615          Therapy Assessment/Plan (OT)    Rehab Potential (OT)  good, to achieve stated therapy goals  -BB     Criteria for Skilled Therapeutic Interventions Met (OT)  yes;meets criteria;skilled treatment is necessary  -BB     Therapy Frequency (OT)  other (see comments) 5-7 days a week  -BB     Row Name 05/01/21 0615          Therapy Plan Review/Discharge Plan (OT)    Anticipated Discharge Disposition (OT)  home with home health  -BB     Row Name 05/01/21 0615          Vital Signs    Pre Systolic BP Rehab  143  -BB     Pre Treatment Diastolic BP  68  -BB     Pretreatment Heart Rate (beats/min)  98  -BB     Pre SpO2 (%)  98  -BB     O2 Delivery Pre Treatment  room air  -BB     Pre Patient Position  Supine  -BB     Row Name 05/01/21 0615          Positioning and Restraints    Pre-Treatment Position  in bed  -BB     Post Treatment Position  bed  -BB     In Bed  fowlers;call light within reach;encouraged to call for assist;exit alarm on  -BB       User Key  (r) = Recorded By, (t) = Taken By, (c) = Cosigned By    Initials Name Provider Type    BB Elissa Wheeler COTA/L Occupational Therapy Assistant        Outcome Measures     Row Name 05/01/21 0615          How much help from another is currently needed...    Putting on and taking off regular lower body clothing?  2  -BB     Bathing (including washing, rinsing, and drying)  2  -BB     Toileting (which includes using toilet bed pan or urinal)  2  -BB     Putting on and taking off regular upper body  clothing  2  -BB     Taking care of personal grooming (such as brushing teeth)  4  -BB     Eating meals  4  -BB     AM-PAC 6 Clicks Score (OT)  16  -BB       User Key  (r) = Recorded By, (t) = Taken By, (c) = Cosigned By    Initials Name Provider Type    BB Elissa Wheeler COTA/L Occupational Therapy Assistant        Occupational Therapy Education                 Title: PT OT SLP Therapies (Resolved)     Topic: Occupational Therapy (Resolved)     Point: ADL training (Resolved)     Description:   Instruct learner(s) on proper safety adaptation and remediation techniques during self care or transfers.   Instruct in proper use of assistive devices.              Learner Progress:  Not documented in this visit.          Point: Home exercise program (Resolved)     Description:   Instruct learner(s) on appropriate technique for monitoring, assisting and/or progressing therapeutic exercises/activities.              Learner Progress:  Not documented in this visit.          Point: Precautions (Resolved)     Description:   Instruct learner(s) on prescribed precautions during self-care and functional transfers.              Learning Progress Summary           Patient Acceptance, E, VU by  at 4/24/2021 0930    Comment: Pt educated on role of OT, d/c recs, and POC                   Point: Body mechanics (Resolved)     Description:   Instruct learner(s) on proper positioning and spine alignment during self-care, functional mobility activities and/or exercises.              Learner Progress:  Not documented in this visit.                      User Key     Initials Effective Dates Name Provider Type Discipline     09/10/19 -  Rancho Johnson OT Occupational Therapist OT              OT Recommendation and Plan  Therapy Frequency (OT): other (see comments) (5-7 days a week)  Plan of Care Review  Plan of Care Reviewed With: patient  Progress: no change  Outcome Summary: Pt educated on home safety/fall prevention. Pt is Min A  for supine<>sit and Min A for bed<>toilet t/f using RW. Pt is Max A to doff/don socks. Pt will likely benefit from HHOT at time of D/C. Continue OT POC     Time Calculation:   Time Calculation- OT     Row Name 05/01/21 1149             Time Calculation- OT    OT Start Time  0615  -BB      OT Stop Time  0655  -BB      OT Time Calculation (min)  40 min  -BB      Total Timed Code Minutes- OT  40 minute(s)  -BB      OT Received On  05/01/21  -BB         Timed Charges    13975 - OT Self Care/Mgmt Minutes  40  -BB         Total Minutes    Timed Charges Total Minutes  40  -BB       Total Minutes  40  -BB        User Key  (r) = Recorded By, (t) = Taken By, (c) = Cosigned By    Initials Name Provider Type    BB Elissa Wheeler COTA/L Occupational Therapy Assistant        Therapy Charges for Today     Code Description Service Date Service Provider Modifiers Qty    81057660025 HC OT SELF CARE/MGMT/TRAIN EA 15 MIN 5/1/2021 Elissa Wheeler COTA/L GO 3               DAMIAN Lyons  5/1/2021

## 2021-05-01 NOTE — DISCHARGE PLACEMENT REQUEST
"Josefina Ward Jr. (89 y.o. Male)     Date of Birth Social Security Number Address Home Phone MRN    12/17/1926  [address] [id number] [phone] [id number]    [place] Marital Status          Shinto        Admission Date Admission Type Admitting Provider Attending Provider Department, Room/Bed    4/21/21 Emergency Joanie Floyd MD Popescu, Tudor, MD [address], 370/1    Discharge Date Discharge Disposition Discharge Destination         [place] [place]              Attending Provider: Nitish Greenwood MD    Allergies: Lotrel [Amlodipine Besy-benazepril Hcl], Aliskiren, Colesevelam, Contrast Dye, Influenza Virus Vaccine Split, Lipitor [Atorvastatin], Penicillins, Ticlopidine    Isolation: None   Infection: None   Code Status: CPR    Ht: 180.3 cm (70.98\")   Wt: 86.3 kg (190 lb 3.2 oz)    Admission Cmt: None   Principal Problem: Hematoma of left lower extremity [S80.12XA]                 Active Insurance as of 4/21/2021     Primary Coverage     Payor Plan Insurance Group Employer/Plan Group    MEDICARE MEDICARE A & B      Payor Plan Address Payor Plan Phone Number Payor Plan Fax Number Effective Dates    [address] [phone]  12/1/1991 - None Entered    [facility] [id number]       Subscriber Name Subscriber Birth Date Member ID       JOSEFINA WARD JR. 12/17/1926 [id number]           Secondary Coverage     Payor Plan Insurance Group Employer/Plan Group    AARP MC SUP AARP HEALTH CARE OPTIONS PLAN F     Payor Plan Address Payor Plan Phone Number Payor Plan Fax Number Effective Dates    [facility] [phone]  1/1/2016 - None Entered    [address]       [facility] [address]       Subscriber Name Subscriber Birth Date Member ID       JOSEFINA WARD JR. 12/17/1926 [id number]                 Emergency Contacts      (Rel.) Home Phone Work Phone Mobile Phone    Melvi Ward (Spouse) -- -- [phone]    " "Matt Qureshi (Daughter) -- -- [phone]          [address] [address]  Dept. Phone:  [phone]  Dept. Fax:  [phone] Date Ordered: May 1, 2021         Patient:  Josefina Stephens Jr. MRN:  [id number]   [address] [facility] [address] :  1926  SSN:    Phone: [phone] Sex:  M     Weight: 86.3 kg (190 lb 3.2 oz)         Ht Readings from Last 1 Encounters:   21 180.3 cm (70.98\")         Walker               (Order ID: [id number])    Diagnosis:  Impaired mobility and activities of daily living (Z74.09,Z78.9 [ICD-10-CM] V49.89 [ICD-9-CM])  Impaired functional mobility, balance, gait, and endurance (Z74.09 [ICD-10-CM] V49.89 [ICD-9-CM])   Quantity:  1  Comments: 5 inch wheels     Equipment:  Walker Folding with Wheels  Length of Need (99 Months = Lifetime): 99 Months = Lifetime        Authorizing Provider's Phone: [phone]   Authorizing Provider:Bebeto Puente MD  Authorizing Provider's NPI: [id number]  Order Entered By: Bebeto Puente MD 2021  9:58 AM     Electronically signed by: Bebeto Puente MD 2021  9:58 AM          [address] [address]  Dept. Phone:  [phone]  Dept. Fax:  [phone] Date Ordered: May 1, 2021         Patient:  Josefina Stephens Jr. MRN:  [id number]   [address] [facility] [address] :  1926  SSN:    Phone: [phone] Sex:  M     Weight: 86.3 kg (190 lb 3.2 oz)         Ht Readings from Last 1 Encounters:   21 180.3 cm (70.98\")         Commode Chair          (Order ID: [id number])    Diagnosis:  Impaired mobility and activities of daily living (Z74.09,Z78.9 [ICD-10-CM] V49.89 [ICD-9-CM])  Impaired functional mobility, balance, gait, and endurance (Z74.09 [ICD-10-CM] V49.89 [ICD-9-CM])   Quantity:  1     Equipment:  Bedside Commode Chair w/Fixed " Arms  Length of Need (99 Months = Lifetime): 99 Months = Lifetime        Authorizing Provider's Phone: [phone]   Authorizing Provider:Bebeto Puente MD  Authorizing Provider's NPI: [id number]  Order Entered By: Bebeto Puente MD 5/1/2021  9:58 AM     Electronically signed by: Bebeto Puente MD 5/1/2021  9:58 AM

## 2021-05-02 ENCOUNTER — READMISSION MANAGEMENT (OUTPATIENT)
Dept: CALL CENTER | Facility: HOSPITAL | Age: 86
End: 2021-05-02

## 2021-05-02 NOTE — OUTREACH NOTE
Prep Survey      Responses   [facility] patient discharged from?  [place]   Is LACE score < 7 ?  No   Emergency Room discharge w/ pulse ox?  No   Eligibility  Readm Mgmt   Discharge diagnosis  Hematoma of left lower extremity,  Sepsis,  Syncope and collapse   Does the patient have one of the following disease processes/diagnoses(primary or secondary)?  Sepsis   Does the patient have Home health ordered?  Yes   What is the Home health agency?   [facility]   Is there a DME ordered?  Yes   What DME was ordered?  RW and BSC from [facility]   Prep survey completed?  Yes          Erika Magana RN

## 2021-05-06 ENCOUNTER — OFFICE VISIT (OUTPATIENT)
Dept: ORTHOPEDIC SURGERY | Facility: CLINIC | Age: 86
End: 2021-05-06

## 2021-05-06 ENCOUNTER — READMISSION MANAGEMENT (OUTPATIENT)
Dept: CALL CENTER | Facility: HOSPITAL | Age: 86
End: 2021-05-06

## 2021-05-06 VITALS
SYSTOLIC BLOOD PRESSURE: 97 MMHG | DIASTOLIC BLOOD PRESSURE: 69 MMHG | BODY MASS INDEX: 27.2 KG/M2 | HEIGHT: 70 IN | WEIGHT: 190 LBS | RESPIRATION RATE: 18 BRPM | HEART RATE: 109 BPM | OXYGEN SATURATION: 96 % | TEMPERATURE: 98.7 F

## 2021-05-06 DIAGNOSIS — M79.672 LEFT FOOT PAIN: ICD-10-CM

## 2021-05-06 DIAGNOSIS — M79.662 PAIN AND SWELLING OF LEFT LOWER LEG: Primary | ICD-10-CM

## 2021-05-06 DIAGNOSIS — M25.561 ACUTE PAIN OF RIGHT KNEE: ICD-10-CM

## 2021-05-06 DIAGNOSIS — M10.9 ACUTE GOUT INVOLVING TOE OF LEFT FOOT, UNSPECIFIED CAUSE: ICD-10-CM

## 2021-05-06 DIAGNOSIS — M79.89 PAIN AND SWELLING OF LEFT LOWER LEG: Primary | ICD-10-CM

## 2021-05-06 PROCEDURE — 99214 OFFICE O/P EST MOD 30 MIN: CPT | Performed by: ORTHOPAEDIC SURGERY

## 2021-05-06 RX ORDER — METHYLPREDNISOLONE 4 MG/1
1 TABLET ORAL DAILY
Qty: 21 TABLET | Refills: 0 | Status: SHIPPED | OUTPATIENT
Start: 2021-05-06 | End: 2021-05-25

## 2021-05-06 RX ORDER — METHYLPREDNISOLONE 4 MG/1
1 TABLET ORAL DAILY
Qty: 21 TABLET | Refills: 0 | Status: SHIPPED | OUTPATIENT
Start: 2021-05-06 | End: 2021-05-06

## 2021-05-06 NOTE — OUTREACH NOTE
Sepsis Week 1 Survey      Responses   [facility] patient discharged from?  [place]   Does the patient have one of the following disease processes/diagnoses(primary or secondary)?  Sepsis   Week 1 attempt successful?  Yes   Call start time  1336   Rescheduled  Rescheduled-pt requested   Call end time  1337   Is patient permission given to speak with other caregiver?  Yes   List who call center can speak with  spouse or daughter          Romina Burdick RN

## 2021-05-06 NOTE — PROGRESS NOTES
Josefina Stephens Jr. is a 89 y.o. male   Primary provider:  Leticia Field APRN       Chief Complaint   Patient presents with   • Left Lower Leg - Pain, Initial Evaluation   • Left Foot - Pain, Initial Evaluation       HISTORY OF PRESENT ILLNESS: This is the first office follow-up for hematoma of the left thigh.    Mr. Stephens is 89 years old.  He fell on about 20 April with an injury to the left knee and thigh.  He subsequently developed extensive hematoma in the thigh.  He is on Eliquis for atrial fibrillation.  He was admitted to the hospital.  Aspiration of the hematoma was performed shortly after admission but this yielded only minimal fluid.  He has been treated with initial withholding of his anticoagulants and soft support.  He has had continued moderate swelling in the thigh.  More recently he has had onset of pain in the left foot and right knee.  There is been no history of trauma.  He has a past history of gout.  I was called earlier today concerning valuation of his symptoms and I asked that he come in for reevaluation.    Pain  This is a new problem. The current episode started 1 to 4 weeks ago (04/20/2021). The problem occurs constantly. The problem has been unchanged. Associated symptoms include joint swelling. Associated symptoms comments: Aching,stinging,redness,pain and swelling. The symptoms are aggravated by standing. He has tried acetaminophen, ice and rest for the symptoms. The treatment provided no relief.        CONCURRENT MEDICAL HISTORY:    Past Medical History:   Diagnosis Date   • Arthritis    • Atrial fibrillation (CMS/HCC)    • Callus    • Duodenal ulcer     HX   • GERD (gastroesophageal reflux disease)    • GI (gastrointestinal bleed)     HX   • Gout    • Hyperlipidemia    • Hypertension    • Prostate cancer (CMS/HCC)    • Skin cancer    • Sleep apnea    • TIA (transient ischemic attack)        Allergies   Allergen Reactions   • Lotrel [Amlodipine Besy-Benazepril Hcl] Swelling    • Aliskiren    • Colesevelam Diarrhea   • Contrast Dye    • Influenza Virus Vaccine Split    • Lipitor [Atorvastatin]    • Penicillins    • Ticlopidine Rash         Current Outpatient Medications:   •  Acetaminophen (TYLENOL PO), Take  by mouth As Needed., Disp: , Rfl:   •  amLODIPine (NORVASC) 10 MG tablet, Take 10 mg by mouth Daily., Disp: , Rfl:   •  apixaban (ELIQUIS) 5 MG tablet tablet, Take 5 mg by mouth., Disp: , Rfl:   •  bisoprolol-hydrochlorothiazide (ZIAC) 10-6.25 MG per tablet, Take 1 tablet by mouth Daily., Disp: , Rfl:   •  isosorbide mononitrate (IMDUR) 60 MG 24 hr tablet, Take 60 mg by mouth Daily., Disp: , Rfl:   •  methylPREDNISolone (MEDROL) 4 MG dose pack, Take 1 tablet by mouth Daily. Use as directed by package instructions, Disp: 21 tablet, Rfl: 0  •  Multiple Vitamins-Minerals (MULTIVITAMIN ADULT PO), Take  by mouth., Disp: , Rfl:   •  pravastatin (PRAVACHOL) 40 MG tablet, Take 40 mg by mouth Daily., Disp: , Rfl:   •  simethicone (MYLICON) 125 MG chewable tablet, Chew 125 mg., Disp: , Rfl:     Past Surgical History:   Procedure Laterality Date   • COLONOSCOPY  1955   • COLONOSCOPY N/A 7/6/2017    Procedure: COLONOSCOPY to cecum ;  Surgeon: Faisal Mac MD;  Location: [facility] ENDOSCOPY;  Service:    • CORONARY ANGIOPLASTY WITH STENT PLACEMENT     • ENDOSCOPY N/A 7/6/2017    Procedure: ESOPHAGOGASTRODUODENOSCOPY with biopsies;  Surgeon: Faisal Mac MD;  Location: [facility] ENDOSCOPY;  Service:    • HERNIA REPAIR     • PROSTATE RADIOACTIVE SEED IMPLANT     • SKIN BIOPSY     • UPPER GASTROINTESTINAL ENDOSCOPY  11/12/2008    GI bleed, peptic ulcer disease, melena, treated w/heater probe       Family History   Problem Relation Age of Onset   • Heart disease Father        Social History     Socioeconomic History   • Marital status:      Spouse name: Not on file   • Number of children: Not on file   • Years of education: Not on file   • Highest education level: Not on file   Tobacco Use  "  • Smoking status: Never Smoker   • Smokeless tobacco: Never Used   Vaping Use   • Vaping Use: Never assessed   Substance and Sexual Activity   • Alcohol use: No   • Drug use: No   • Sexual activity: Defer        Review of Systems   Musculoskeletal: Positive for joint swelling.        Muscle pain and stiffness   All other systems reviewed and are negative.    Review of systems is positive as noted above.  PHYSICAL EXAMINATION:       Vitals:    05/06/21 1512   BP: 97/69   Pulse: 109   Resp: 18   Temp: 98.7 °F (37.1 °C)   SpO2: 96%   Weight: 86.2 kg (190 lb)   Height: 177.8 cm (70\")   PainSc:   8       Physical Exam he is alert pleasant and in no apparent distress.    GAIT:     []  Normal  []  Antalgic    Assistive device: []  None  []  Walker     []  Crutches  []  Cane     [x]  Wheelchair  []  Stretcher    Ortho Exam is directed to the lower extremities.  There is an area of erythema over the anterolateral aspect of the right knee.  This appears to be superficial overlying the patella.  There is 1+ effusion of the knee.  Motion of the knee is smooth but painfully restricted from about 15 to 90 degrees.  Exam of the left lower extremity shows erythema over the dorsal medial aspect of the great toe at the MP TP level.  There is no cellulitis.  Passive motion of the great toe MTP joint produces no crepitus but pain.  There is moderate fullness of the medial aspect of the thigh extending from distal thigh to the groin.  This swelling appears to be more prominent than on my last exam.  There are several small areas of healed serous epidermal lysis.      XR Knee 1 or 2 View Right    Result Date: 5/6/2021  Ordering Provider:  Rory Mckenzie MD Ordering Diagnosis/Indication:  Acute pain of right knee Procedure:  XR KNEE 1 OR 2 VW RIGHT Exam Date:  5/6/21 COMPARISON: None      Radiographs of the right knee AP and lateral views done today show mild chondrocalcinosis both medial and lateral.  There is some minimal joint " space narrowing.  There is spurring of the patella.  There is also vascular calcification in the thigh. Rory Mckenzie MD 5/6/21    XR Foot 3+ View Left    Result Date: 5/6/2021  Ordering Provider:  Rory Mckenzie MD Ordering Diagnosis/Indication:  Left foot pain, Pain and swelling of left lower leg Procedure:  XR FOOT 3+ VW LEFT Exam Date:  5/6/21 COMPARISON: None      Radiographs of the left foot PA lateral and oblique views done today Show mild narrowing of the great toe MTP joint.  There is no evidence of fracture.  There is moderate spurring of the heel cord insertion. Rory Mckenzie MD 5/6/21              ASSESSMENT: 1 hematoma left thigh.  2 probable gouty involvement right knee and left foot.  3 mild osteoarthritis right knee.  4 atrial fibrillation requiring anticoagulation.    I had a prolonged discussion with the patient and his family concerning treatment options.  I do not think that aspiration of his hematoma would be of any benefit this amount of time after his injury.  Surgical evacuation of the hematoma is an option but I think the risk associated with this would be prohibitive.  I recommended continued symptomatic care and soft support.    Think his right knee and left foot symptoms are probably related to gouty arthropathy.  He was instructed in activity restriction and soft support.  Because of his anticoagulation I feel that anti-inflammatories are contraindicated.  I will call in a prescription for Medrol Dosepak.    Return here in 2 weeks for clinical exam.  However if his knee and foot symptoms do not improve over the next week or so he will call for possible earlier evaluation.    Diagnoses and all orders for this visit:    Pain and swelling of left lower leg  -     XR Foot 3+ View Left    Left foot pain  -     XR Foot 3+ View Left  -     methylPREDNISolone (MEDROL) 4 MG dose pack; Take 1 tablet by mouth Daily. Use as directed by package instructions    Acute pain of right  knee  -     XR Knee 1 or 2 View Right  -     methylPREDNISolone (MEDROL) 4 MG dose pack; Take 1 tablet by mouth Daily. Use as directed by package instructions    Acute gout involving toe of left foot, unspecified cause          PLAN    Return in about 2 weeks (around 5/20/2021).    Rory Mckenzie MD

## 2021-05-12 ENCOUNTER — READMISSION MANAGEMENT (OUTPATIENT)
Dept: CALL CENTER | Facility: HOSPITAL | Age: 86
End: 2021-05-12

## 2021-05-12 NOTE — OUTREACH NOTE
Sepsis Week 1 Survey      Responses   [facility] patient discharged from?  [place]   Does the patient have one of the following disease processes/diagnoses(primary or secondary)?  Sepsis   Week 1 attempt successful?  Yes   Call start time  1114   Call end time  1120   Discharge diagnosis  Hematoma of left lower extremity,  Sepsis,  Syncope and collapse   Is patient permission given to speak with other caregiver?  Yes   List who call center can speak with  spouse or daughter   Person spoke with today (if not patient) and relationship  Melvi/ wife   Meds reviewed with patient/caregiver?  Yes   Is the patient having any side effects they believe may be caused by any medication additions or changes?  No   Does the patient have all medications related to this admission filled (includes all antibiotics, inhalers, nebulizers,steroids,etc.)  Yes   Is the patient taking all medications as directed (includes completed medication regime)?  Yes   Comments regarding appointments  Has seen ortho on 5/6/2021 and cards on 5/25/2021   Does the patient have a primary care provider?   Yes   Does the patient have an appointment with their PCP within 7 days of discharge?  Yes   Has the patient kept scheduled appointments due by today?  Yes   What is the Home health agency?   [facility]   Has home health visited the patient within 72 hours of discharge?  Yes   What DME was ordered?  RW and BSC from [facility]   Has all DME been delivered?  Yes   Psychosocial issues?  No   Did the patient receive a copy of their discharge instructions?  Yes   Nursing interventions  Reviewed instructions with patient   What is the patient's perception of their health status since discharge?  Improving   Nursing interventions  Nurse provided patient education   Is the patient/caregiver able to teach back Sepsis?  S - Shivering,fever or very cold, E - Extreme pain or generalized discomfort (worst ever,especially abdomen), P - Pale or discolored skin, S -  Sleepy, difficult to arouse,confused, I -   I feel like I might die-a feeling of hopelessness, S - Short of breath   Nursing interventions  Nurse provided reassurance to patient   Is patient/caregiver able to teach back steps to recovery at home?  Set small, achievable goals for return to baseline health, Rest and regain strength, Make a list of questions for PCP appoinment   Is the patient/caregiver able to teach back signs and symptoms of worsening condition:  Fever, Hyperthermia, Shortness of breath/rapid respiratory rate, Rapid heart rate (>90)   If the patient is a current smoker, are they able to teach back resources for cessation?  Not a smoker   Is the patient/caregiver able to teach back the hierarchy of who to call/visit for symptoms/problems? PCP, Specialist, Home health nurse, Urgent Care, ED, 911  Yes   Week 1 call completed?  Yes   Wrap up additional comments  Wife reports he is doing well. no questions or concerns.           Umair Phillip RN

## 2021-05-20 ENCOUNTER — OFFICE VISIT (OUTPATIENT)
Dept: ORTHOPEDIC SURGERY | Facility: CLINIC | Age: 86
End: 2021-05-20

## 2021-05-20 VITALS
SYSTOLIC BLOOD PRESSURE: 120 MMHG | HEIGHT: 70 IN | WEIGHT: 190 LBS | HEART RATE: 94 BPM | RESPIRATION RATE: 18 BRPM | TEMPERATURE: 98.1 F | OXYGEN SATURATION: 97 % | BODY MASS INDEX: 27.2 KG/M2 | DIASTOLIC BLOOD PRESSURE: 75 MMHG

## 2021-05-20 DIAGNOSIS — M79.672 LEFT FOOT PAIN: ICD-10-CM

## 2021-05-20 DIAGNOSIS — M79.662 PAIN AND SWELLING OF LEFT LOWER LEG: Primary | ICD-10-CM

## 2021-05-20 DIAGNOSIS — M10.9 ACUTE GOUT INVOLVING TOE OF LEFT FOOT, UNSPECIFIED CAUSE: ICD-10-CM

## 2021-05-20 DIAGNOSIS — M25.562 LEFT KNEE PAIN, UNSPECIFIED CHRONICITY: ICD-10-CM

## 2021-05-20 DIAGNOSIS — D69.9 BLEEDING DISORDER (HCC): ICD-10-CM

## 2021-05-20 DIAGNOSIS — M25.561 ACUTE PAIN OF RIGHT KNEE: ICD-10-CM

## 2021-05-20 DIAGNOSIS — M79.89 PAIN AND SWELLING OF LEFT LOWER LEG: Primary | ICD-10-CM

## 2021-05-20 PROCEDURE — 99213 OFFICE O/P EST LOW 20 MIN: CPT | Performed by: ORTHOPAEDIC SURGERY

## 2021-05-20 NOTE — PROGRESS NOTES
"Josefina Stephens Jr. is a 89 y.o. male returns for     Chief Complaint   Patient presents with   • Left Knee - Follow-up   • Right Knee - Follow-up   • Left Foot - Follow-up       HISTORY OF PRESENT ILLNESS:  Patient in for follow up of bilateral knee pain and left foot pain    Mr. Stephens has had improvement in pain in his left thigh and left foot.       CONCURRENT MEDICAL HISTORY:    The following portions of the patient's history were reviewed and updated as appropriate: allergies, current medications, past family history, past medical history, past social history, past surgical history and problem list.         PHYSICAL EXAMINATION:       /75   Pulse 94   Temp 98.1 °F (36.7 °C)   Resp 18   Ht 177.8 cm (70\")   Wt 86.2 kg (190 lb)   SpO2 97%   BMI 27.26 kg/m²     Physical Exam he is alert pleasant and in no apparent distress.    GAIT:     []  Normal  []  Antalgic    Assistive device: []  None  []  Walker     []  Crutches  []  Cane     []  Wheelchair  []  Stretcher    Ortho Exam there is resolving ecchymosis over the anterior medial aspect of the thigh from knee to inguinal crease.  There is no evidence of infection.  He still has a large subcutaneous fullness.  There is no warmth or erythema.    Potential benefits of aspiration were discussed with the patient and his family.  They wish to proceed with this.    The left thigh was prepped.  The skin was infiltrated with 1% Xylocaine with epinephrine.  The subcutaneous tissues of the thigh were then aspirated of 260 cc of blood.  There was no purulence.  He tolerated this well.  The soft isotonic dressing was applied to the thigh.      Adult Transthoracic Echo Complete w/ Color, Spectral and Contrast if necessary per protocol    Result Date: 4/22/2021  Narrative: · Left ventricular wall thickness is consistent with mild concentric hypertrophy. · Estimated left ventricular EF = 61% Left ventricular ejection fraction appears to be 61 - 65%. Left " ventricular systolic function is normal. · Left ventricular diastolic function is consistent with (grade Ia w/high LAP) impaired relaxation. · Left atrial volume is moderately increased. · Mild aortic valve stenosis is present. · Estimated right ventricular systolic pressure from tricuspid regurgitation is normal (<35 mmHg). · Mild dilation of the proximal aorta is present.      XR Shoulder 2+ View Right    Result Date: 4/29/2021  Narrative: Procedure: Right    shoulder INDICATION: Right shoulder pain   . Technique: Three    views. Prior examination: None. FINDINGS: 1.Degenerative changes acromioclavicular joint. Prominent distal clavicular spur projecting inferiorly. This may cause underlying impingement. 2.There is remodeling of the inferior surface of the acromion and upward positioning of the humerus with respect to the glenoid. This suggests chronic rotator cuff disruption. 3.Soft tissue calcification adjacent to lateral aspect of the humeral head suggesting calcific tendinitis.     Impression: As above. Electronically signed by:  Curt Paige MD  4/29/2021 4:13 PM CDT Workstation: [id number]    XR Femur 2 View Left    Result Date: 4/21/2021  Narrative: EXAM DESCRIPTION: LEFT FEMUR TWO VIEWS CLINICAL HISTORY: Leg swelling. COMPARISON: None FINDINGS:  Frontal and lateral projections of the left femur to include the hip and knee joints are obtained. There is anatomic alignment and mineralization is considered within limits of normal. No fracture, dislocation, or suspicious osseous lesion identified. The hip and knee joints are intact. There is spurring projecting from the anterior superior/inferior margin of the patella. There is mild soft tissue swelling at the level of the knee. There is vascular calcification noted. There are surgical clips seen within the soft tissue pelvis region as well as radiation prostate seeds.     Impression: Negative for fracture or dislocation. Electronically signed by:  Sandoval Ingram  MD  4/21/2021 12:12 PM CDT Workstation: [id number]    XR Knee 1 or 2 View Right    Result Date: 5/6/2021  Narrative: Ordering Provider:  Rory Mckenzie MD Ordering Diagnosis/Indication:  Acute pain of right knee Procedure:  XR KNEE 1 OR 2 VW RIGHT Exam Date:  5/6/21 COMPARISON: None     Impression:  Radiographs of the right knee AP and lateral views done today show mild chondrocalcinosis both medial and lateral.  There is some minimal joint space narrowing.  There is spurring of the patella.  There is also vascular calcification in the thigh. Rory Mckenzie MD 5/6/21    XR Knee 4+ View Left    Result Date: 4/21/2021  Narrative: EXAM DESCRIPTION: LEFT KNEE 4 VIEWS CLINICAL HISTORY: Leg injury . COMPARISON: None. FINDINGS: AP, oblique, tunnel, and lateral projections of the left knee are obtained. The alignment is anatomic in the mineralization is considered within limits of normal. No fracture, dislocation, or suspicious osseous lesion identified. There is spurs projecting from the anterior superior/inferior margin of the patella. Mild degenerative changes with medial joint space narrowing. There is vascular calcification and soft tissue swelling present. No large joint effusion identified.     Impression: Soft tissue swelling without fracture or dislocation identified. Electronically signed by:  Sandoval Ingram MD  4/21/2021 12:18 PM CDT Workstation: [id number]    XR Tibia Fibula 2 View Left    Result Date: 4/21/2021  Narrative: PROCEDURE: Left tibia and fibula 4 views REASON FOR EXAM: leg injury FINDINGS: Bony structures of the left tibia and fibula are normal. There is no evidence of fracture or dislocation. Soft tissue structures reveals atrophy of the left calf musculature. Soft tissue structures otherwise unremarkable. No radiopaque foreign body .     Impression: 1.  No acute left tibia or fibula abnormality. 2.  Soft tissue structures reveals atrophy of the left calf musculature. Electronically signed by:   Faisal Lopez MD  4/21/2021 12:11 PM CDT Workstation: [id number]    XR Foot 3+ View Left    Result Date: 5/6/2021  Narrative: Ordering Provider:  Rory Mckenzie MD Ordering Diagnosis/Indication:  Left foot pain, Pain and swelling of left lower leg Procedure:  XR FOOT 3+ VW LEFT Exam Date:  5/6/21 COMPARISON: None     Impression:  Radiographs of the left foot PA lateral and oblique views done today Show mild narrowing of the great toe MTP joint.  There is no evidence of fracture.  There is moderate spurring of the heel cord insertion. Rory Mckenzie MD 5/6/21    CT Head Without Contrast    Result Date: 4/29/2021  Narrative: Mental status change. CT head without intravenous contrast. Comparison is made with study dated April 21, 2021. Radiation dose-limiting techniques also utilized, including automated exposure control and adjustment of mA and/or KVP to the patient size according to ALARA (as low as reasonably achievable). There is diffuse cerebral atrophy with very minimal dilatation of ventricular system. No midline shift is identified. There are periventricular and white matter hypodensities. There is no evidence of acute ischemia, intracranial hemorrhage or mass. No acute abnormality is seen in the posterior fossa. No pituitary lesion is identified. The visualized sinuses and mastoid cells are clear. The calvarium is intact.     Impression: CONCLUSION: 1. No acute intracranial abnormality is identified. 2. Atrophy and microvascular changes. Electronically signed by:  Zay Raphael MD  4/29/2021 6:09 PM CDT Workstation: [id number]    CT Head Without Contrast    Result Date: 4/21/2021  Narrative: PROCEDURE: CT HEAD WITHOUT IV CONTRAST CLINICAL HISTORY: fall; seizures INDICATION: Same as above Comparison: None TECHNIQUE: CT of the head was done without intravenous contrast in orthogonal planes. This exam was performed according to the departmental dose-optimization program which includes automated  exposure control, adjustment of the mA and/or kV according to patient size and/or use of iterative reconstruction technique.  FINDINGS: There is no intracranial hemorrhage, midline shift, mass effect or acute focal infarct. There is presence of chronic small vessel white matter ischemic change in a periventricular distribution and extending into the centrum semiovale region. Age-appropriate cerebral and cerebellar atrophy is noted. Intracranial atherosclerotic vascular wall calcifications are seen. There is no hydrocephalus. The mastoid air cells are unremarkable. The paranasal sinuses are unremarkable. There is no visualization of acute displaced fractures involving the calvarium or the skull base.     Impression: There is no acute intracranial abnormality. Age-related and chronic involutional changes are noted If clinical concern exists regarding an acute ischemic/vascular etiology being responsible for patient's symptomatology, an MRI of the brain is more sensitive than the current study, in ruling out such a possibility. Electronically signed by:  Quinton Ramirez MD  4/21/2021 9:57 AM CDT Workstation: [id number]    US Guided Vascular Access    Result Date: 4/21/2021  Narrative: PROCEDURE: Ultrasound Guidance Vascular Access CLINICAL INDICATION: access FINDINGS: Realtime ultrasound imaging was utilized to visualize needle entry into the right basilic vein during midline catheter placement and a permanent image was stored for permanent recording and reporting.     Impression: Imaging obtained during vascular access device placement under ultrasound guidance as described above Electronically signed by:  Rashel Parnell MD  4/21/2021 12:45 PM CDT Workstation: [id number]    US Arterial Doppler Lower Extremity Left    Result Date: 4/21/2021  Narrative: PROCEDURE: US LOWER EXTREMITY ARTERIES LIMITED/UNILATERAL/FOLLOW UP COMPARISON: No comparison HISTORY: Left leg injury FINDINGS: Realtime grayscale, color flow and  spectral analysis was performed of the left leg arteries Very limited exam due to a large hematoma seen in the left upper leg beginning in the groin area extending down to the knee. Unable to visualize the vascular structures deep to the hematoma. Common femoral artery peak systolic velocity: 59.6  cm/sec, triphasic Profunda femoris and superficial femoral arteries are not visualized. Proximal popliteal artery peak systolic velocity: 37.9 cm/sec, biphasic Distal popliteal artery peak systolic velocity: 24.8 cm/sec, biphasic Peroneal artery peak systolic velocity: 46.2 cm/sec, biphasic Posterior tibial artery peak systolic velocity: 38.3 cm/sec, triphasic     Impression: Limited arterial Doppler study of the left lower leg showing flow in the left common femoral artery, popliteal artery, and in the peroneal and posterior tibial arteries in the calf. The superficial and deep femoral arteries of the left thigh cannot be assessed due to a large hematoma. Areas of triphasic flow are suspicious for mild peripheral vascular disease. Electronically signed by:  Rashel Parnell MD  4/21/2021 12:52 PM CDT Workstation: [id number]    XR Chest 1 View    Result Date: 4/25/2021  Narrative: PROCEDURE: Single view upright AP portable chest x-ray at 5:08 PM. INDICATION: SOB, R55 Syncope and collapse N17.9 Acute kidney failure, unspecified T14.8XXA Other injury of unspecified body region, initial encounter Z74.09 Other reduced mobility Z78.9 Other specified health status Z74.09 Other reduced mobility COMPARISON: 4/21/2021 chest x-ray. FINDINGS: Moderate cardiomegaly. Moderate pulmonary with new moderate vascular congestion. There also may be developing edema with increased interstitial markings suspicious . No pleural effusions.     Impression: Cardiomegaly with new moderate vascular congestion and possible early edema. No pleural effusion. [id number] Electronically signed by:  Andrew Buckley MD  4/25/2021 6:38 PM CDT Workstation:  [id number]    XR Chest 1 View    Result Date: 4/21/2021  Narrative: PROCEDURE: Single view AP semierect portable chest x-ray at 9:24 AM. INDICATION: Coronary artery disease. COMPARISON: None. Mild cardiomegaly with normal vascularity. Bilateral low lung volumes. Lungs clear with no acute infiltrate. No pleural effusion. Bony structures unremarkable.     Impression: Bilateral low lung volumes with no acute disease. Mild cardiomegaly. [id number] Electronically signed by:  Andrew Buckley MD  4/21/2021 10:19 AM CDT Workstation: [id number]    US Venous Doppler Lower Extremity Left (duplex)    Result Date: 4/21/2021  Narrative: PROCEDURE: Left lower extremity venous duplex COMPARISON: None HISTORY: Left lower extremity pain and swelling FINDINGS: Realtime grayscale and color-flow imaging was performed of the left lower extremity. Very limited exam due to a large hematoma seen in the left upper leg beginning in the groin area extending down to the knee. Unable to visualize the veins deep to the hematoma. The visualized deep veins demonstrate normal compressibility, respiratory variation and augmentation with distal compression. No thrombus is identified.     Impression: CONCLUSION: Limited exam. No DVT is visualized. Electronically signed by:  Rashel Parnell MD  4/21/2021 12:38 PM CDT Workstation: [id number]    IR Insert Midline Without Port Pump 5 Plus    Result Date: 4/21/2021  Narrative: This procedure was auto-finalized with no dictation required.    CT Lower Extremity Left Without Contrast    Result Date: 4/21/2021  Narrative: EXAM: CT LOWER EXTREMITY LEFT WO CONTRAST DATE: 4/21/2021 12:19 PM CDT TECHNIQUE: Axial images were obtained at 3 mm intervals from the level of the left hip down to the left knee. Coronal and sagittal reformatted images were obtained with bone window and soft tissue window images. This exam was performed according to our departmental dose-optimization program, which includes automated exposure  control, adjustment of the mA and/or kV according to patient size and/or use of iterative reconstruction technique. CLINICAL INDICATION: leg pain COMPARISON: Plain films earlier the same day. FINDINGS: There is an extensive hematoma extending from the level of the hip down to the knee - extending for length of 42.5 cm with a width proximally of up to 13.2 cm in AP dimension of 5.5 cm. The CT attenuation value is indicative of acute hemorrhage (mean 77 HU). The hematoma is anterior to the rectus femoris and vastus medialis muscles resulting in extrinsic mass effect on the underlying muscles. The epicenter of the hematoma is between the superficial and deep fascial planes. There is diffuse subcutaneous edema also noted. The underlying bony structures are intact without evidence of an acute fracture or focal osseous abnormality. Extensive vascular calcifications are noted. Radiopaque marker/seeds are noted in the region of the prostate from prior radiation treatment. Postsurgical changes are noted in the lower abdominal wall and left inguinal region. There is a small fatty containing left inguinal hernia.     Impression: 1. Extensive acute hematoma in the left anterior thigh as above. 2. No underlying fractures are identified. A request has been sent for stat critical conference concerning these findings. Electronically signed by:  Asia Garber MD  4/21/2021 1:11 PM CDT Workstation: [id number]    XR Hip With or Without Pelvis 2 - 3 View Left    Result Date: 4/21/2021  Narrative: EXAM DESCRIPTION: X-RAY LEFT HIP TO INCLUDE AP PELVIS THREE VIEWS CLINICAL HISTORY: Leg injury . COMPARISON: None. FINDINGS: AP projection of the pelvis as well as frontal and frog-leg views of the left hip are obtained. The alignment and mineralization are considered normal. The articular surfaces are smooth. No fracture, dislocation, or suspicious osseous lesions seen. No widening of the sacroiliac joints or the symphysis. Phlebolith and  vascular calcifications are present. Degenerative changes within the lower lumbar spine. Postsurgical changes within the soft tissue pelvis as well as radiation prostate seeds.     Impression: No evidence of acute fracture or dislocation. Electronically signed by:  Sandoval Ingram MD  4/21/2021 12:15 PM CDT Workstation: [id number]            ASSESSMENT: Hematoma left thigh.  He was instructed in activity restriction for the next 3 to 4 days as well as use of soft support.  In about 4 days he may begin increasing activities as tolerated.    Return here in 1 month if his symptoms have not improved.    Diagnoses and all orders for this visit:    Pain and swelling of left lower leg    Left foot pain    Acute pain of right knee    Acute gout involving toe of left foot, unspecified cause    Left knee pain, unspecified chronicity    Other orders  -     Drainage left thigh          PLAN  Aspiration hematoma left thigh Drainage left thigh    Date/Time: 5/20/2021 4:11 PM  Performed by: Rory Mckenzie MD  Authorized by: Rory Mckenzie MD   Type: hematoma  Body area: lower extremity (left thigh)    Sedation:  Patient sedated: no    Needle gauge: 18  Drainage: bloody  Drainage amount: 260 cc.  Comments: 260 cc         Return in about 4 weeks (around 6/17/2021).    Rory Mckenzie MD

## 2021-05-21 ENCOUNTER — READMISSION MANAGEMENT (OUTPATIENT)
Dept: CALL CENTER | Facility: HOSPITAL | Age: 86
End: 2021-05-21

## 2021-05-21 NOTE — OUTREACH NOTE
Sepsis Week 2 Survey      Responses   [facility] patient discharged from?  [place]   Does the patient have one of the following disease processes/diagnoses(primary or secondary)?  Sepsis   Week 2 attempt successful?  No   Unsuccessful attempts  Attempt 1          Umair Phillip RN

## 2021-05-25 ENCOUNTER — OFFICE VISIT (OUTPATIENT)
Dept: CARDIOLOGY | Facility: CLINIC | Age: 86
End: 2021-05-25

## 2021-05-25 VITALS
HEART RATE: 91 BPM | BODY MASS INDEX: 28.52 KG/M2 | SYSTOLIC BLOOD PRESSURE: 136 MMHG | DIASTOLIC BLOOD PRESSURE: 80 MMHG | OXYGEN SATURATION: 98 % | WEIGHT: 199.2 LBS | HEIGHT: 70 IN

## 2021-05-25 DIAGNOSIS — I10 ESSENTIAL HYPERTENSION: ICD-10-CM

## 2021-05-25 DIAGNOSIS — E78.2 MIXED HYPERLIPIDEMIA: ICD-10-CM

## 2021-05-25 DIAGNOSIS — I48.91 ATRIAL FIBRILLATION, UNSPECIFIED TYPE (HCC): Primary | ICD-10-CM

## 2021-05-25 DIAGNOSIS — I25.10 CORONARY ARTERY DISEASE INVOLVING NATIVE CORONARY ARTERY OF NATIVE HEART WITHOUT ANGINA PECTORIS: ICD-10-CM

## 2021-05-25 PROCEDURE — 93000 ELECTROCARDIOGRAM COMPLETE: CPT | Performed by: INTERNAL MEDICINE

## 2021-05-25 PROCEDURE — 99214 OFFICE O/P EST MOD 30 MIN: CPT | Performed by: INTERNAL MEDICINE

## 2021-05-25 RX ORDER — OMEPRAZOLE 20 MG/1
20 CAPSULE, DELAYED RELEASE ORAL
COMMUNITY
End: 2021-05-25

## 2021-05-25 RX ORDER — PRAVASTATIN SODIUM 40 MG
40 TABLET ORAL DAILY
Qty: 90 TABLET | Refills: 1 | Status: SHIPPED | OUTPATIENT
Start: 2021-05-25 | End: 2022-02-07

## 2021-05-25 RX ORDER — ASPIRIN 81 MG/1
81 TABLET ORAL DAILY
COMMUNITY
End: 2022-12-06

## 2021-05-25 RX ORDER — CILOSTAZOL 100 MG/1
TABLET ORAL 2 TIMES DAILY
COMMUNITY
Start: 2021-05-10 | End: 2021-06-14 | Stop reason: SDUPTHER

## 2021-05-25 NOTE — PROGRESS NOTES
Josefina Stephens Jr.  89 y.o. male      1. Atrial fibrillation, unspecified type (CMS/HCC)    2. Essential hypertension    3. Mixed hyperlipidemia    4. Coronary artery disease involving native coronary artery of native heart without angina pectoris        History of Present Illness  Josefina Stephens is a 89-year-old male with multiple medical issues including coronary artery disease with history of PCI with a bare-metal stent to the right coronary artery in 2000, (3.0 x 28 mm Velocity stent), permanent atrial fibrillation of unknown duration, hypertension, hyperlipidemia, peripheral vascular disease, sleep apnea, history of cerebrovascular event, prostate carcinoma.  In spite of his advanced age he had been quite active physically and mentally.  On 4/21/2021 the patient presented following a traumatic fall resulting in a large hematoma involving the left lower extremity associated with hypotension and anemia requiring packed RBC transfusion.  He was managed by orthopedics and his symptoms are improved gradually with gradual resolution of hematoma.  Once his hemoglobin stabilized he was restarted on anticoagulation with Eliquis (CHADSVASC score is 6).     Echocardiogram showed:  · Left ventricular wall thickness is consistent with mild concentric hypertrophy.  · Estimated left ventricular EF = 61% Left ventricular ejection fraction appears to be 61 - 65%. Left ventricular systolic function is normal.  · Left ventricular diastolic function is consistent with (grade Ia w/high LAP) impaired relaxation.  · Left atrial volume is moderately increased.  · Mild aortic valve stenosis is present.  · Estimated right ventricular systolic pressure from tricuspid regurgitation is normal (<35 mmHg).  · Mild dilation of the proximal aorta is present.    The patient has followed up with orthopedics following discharge from the hospital and has not had any cardiac symptoms following discharge.  He denied any chest pain, palpitation  or dizziness.  His heart rate and blood pressure were in the normal range today.    SUBJECTIVE    Allergies   Allergen Reactions   • Lotrel [Amlodipine Besy-Benazepril Hcl] Swelling   • Aliskiren    • Colesevelam Diarrhea   • Contrast Dye    • Influenza Virus Vaccine Split    • Lipitor [Atorvastatin]    • Penicillins    • Ticlopidine Rash         Past Medical History:   Diagnosis Date   • Arthritis    • Atrial fibrillation (CMS/HCC)    • Callus    • Duodenal ulcer     HX   • GERD (gastroesophageal reflux disease)    • GI (gastrointestinal bleed)     HX   • Gout    • Hyperlipidemia    • Hypertension    • Prostate cancer (CMS/HCC)    • Skin cancer    • Sleep apnea    • TIA (transient ischemic attack)          Past Surgical History:   Procedure Laterality Date   • COLONOSCOPY  1955   • COLONOSCOPY N/A 7/6/2017    Procedure: COLONOSCOPY to cecum ;  Surgeon: Faisal Mac MD;  Location: [facility] ENDOSCOPY;  Service:    • CORONARY ANGIOPLASTY WITH STENT PLACEMENT     • ENDOSCOPY N/A 7/6/2017    Procedure: ESOPHAGOGASTRODUODENOSCOPY with biopsies;  Surgeon: Faisal Mac MD;  Location: [facility] ENDOSCOPY;  Service:    • HERNIA REPAIR     • PROSTATE RADIOACTIVE SEED IMPLANT     • SKIN BIOPSY     • UPPER GASTROINTESTINAL ENDOSCOPY  11/12/2008    GI bleed, peptic ulcer disease, melena, treated w/heater probe         Family History   Problem Relation Age of Onset   • Heart disease Father          Social History     Socioeconomic History   • Marital status:      Spouse name: Not on file   • Number of children: Not on file   • Years of education: Not on file   • Highest education level: Not on file   Tobacco Use   • Smoking status: Never Smoker   • Smokeless tobacco: Never Used   Vaping Use   • Vaping Use: Never assessed   Substance and Sexual Activity   • Alcohol use: No   • Drug use: No   • Sexual activity: Defer         Current Outpatient Medications   Medication Sig Dispense Refill   • Acetaminophen (TYLENOL PO) Take   "by mouth As Needed.     • amLODIPine (NORVASC) 10 MG tablet Take 10 mg by mouth. 1/2 tab daily     • apixaban (ELIQUIS) 5 MG tablet tablet Take 5 mg by mouth Every 12 (Twelve) Hours.     • bisoprolol-hydrochlorothiazide (ZIAC) 10-6.25 MG per tablet Take 1 tablet by mouth Daily.     • cilostazol (PLETAL) 100 MG tablet 2 (Two) Times a Day.     • isosorbide mononitrate (IMDUR) 60 MG 24 hr tablet Take 60 mg by mouth Daily.     • Multiple Vitamins-Minerals (MULTIVITAMIN ADULT PO) Take  by mouth.     • simethicone (MYLICON) 125 MG chewable tablet Chew 125 mg.     • aspirin (aspirin) 81 MG EC tablet Take 81 mg by mouth Daily.     • pravastatin (PRAVACHOL) 40 MG tablet Take 1 tablet by mouth Daily. 90 tablet 1     No current facility-administered medications for this visit.         OBJECTIVE    /80 (BP Location: Right arm, Patient Position: Sitting, Cuff Size: Adult)   Pulse 91   Ht 177.8 cm (70\")   Wt 90.4 kg (199 lb 3.2 oz)   SpO2 98%   BMI 28.58 kg/m²         Review of Systems the following systems are reviewed and changes noted as indicated in the history of present illness and below.    Constitutional:  Denies recent weight loss, weight gain, fever or chills, no change in exercise tolerance     HENT:   hearing loss, no epistaxis, hoarseness, or difficulty speaking.     Eyes: Wears eyeglasses or contact lenses     Respiratory:  Denies dyspnea with exertion,no cough, wheezing, or hemoptysis.     Cardiovascular: Negative for palpations, chest pain, orthopnea, PND    Gastrointestinal:  Denies change in bowel habits, dyspepsia, ulcer disease, hematochezia, or melena.     Endocrine: Negative for cold intolerance, heat intolerance, polydipsia, polyphagia and polyuria.     Genitourinary: h/o prostate carcinoma      Musculoskeletal: Resolving hematoma left lower extremity    Allergic/Immunologic: Negative.  Negative for environmental allergies, food allergies and immunocompromised state.     Neurological: " TIA    Hematological: Denies any food allergies, seasonal allergies, bleeding disorders, or lymphadenopathy.     Psychiatric/Behavioral: Denies any history of depression, substance abuse, or change in cognitive function.       Physical Exam: The following systems were reassessed and changes noted as indicated below    Constitutional: Cooperative, alert and oriented, well-developed, well-nourished, in no acute distress.     HENT:   Head: Normocephalic, normal hair patterns, no masses or tenderness.  Ears, Nose, and Throat: No gross abnormalities. No pallor or cyanosis. Eyes: EOMS intact, PERRL, conjunctivae and lids unremarkable. Fundoscopic exam and visual fields not performed.   Neck: No palpable masses or adenopathy, no thyromegaly, no JVD, carotid pulses are full and equal bilaterally and without  Bruits.     Cardiovascular: Irregular rhythm, S1 variable, S2 normal, no S3 or S4.  No murmurs, gallops, or rubs detected.     Pulmonary/Chest: Chest: normal symmetry, normal respiratory excursion, no intercostal retraction, no use of accessory muscles.            Pulmonary: Normal breath sounds. No rales or ronchi.    Abdominal: Abdomen soft, bowel sounds normoactive, no masses, no hepatosplenomegaly, non-tender, no bruits.     Musculoskeletal: Resolving hematoma left lower extremity.  Compression bandage in place    Neurological: No gross motor or sensory deficits noted, affect appropriate, oriented to time, person, place.     Psychiatric: He has a normal mood and affect. His behavior is normal. Judgment and thought content normal.         Procedures      Lab Results   Component Value Date    WBC 8.70 05/01/2021    HGB 9.2 (L) 05/01/2021    HCT 27.6 (L) 05/01/2021    MCV 93.6 05/01/2021     05/01/2021     Lab Results   Component Value Date    GLUCOSE 151 (H) 05/01/2021    BUN 21 05/01/2021    CREATININE 1.01 05/01/2021    EGFRIFNONA 69 05/01/2021    EGFRIFAFRI 63 02/25/2019    BCR 20.8 05/01/2021    CO2 29.0  05/01/2021    CALCIUM 8.3 05/01/2021    PROTENTOTREF 8.1 02/25/2019    ALBUMIN 3.40 (L) 04/21/2021    LABIL2 1.4 02/25/2019    AST 18 04/21/2021    ALT 14 04/21/2021     No results found for: CHOL  Lab Results   Component Value Date    TRIG 101 02/25/2019    TRIG 89 05/03/2018    TRIG 111 11/01/2017     Lab Results   Component Value Date    HDL 49 02/25/2019    HDL 42 05/03/2018    HDL 48 11/01/2017     No components found for: LDLCALC  Lab Results   Component Value Date    LDL 83 02/25/2019    LDL 79 05/03/2018    LDL 88 11/01/2017     No results found for: HDLLDLRATIO  No components found for: CHOLHDL  Lab Results   Component Value Date    HGBA1C 5.70 (H) 02/25/2019     Lab Results   Component Value Date    TSH 1.420 02/25/2019           ASSESSMENT AND PLAN  Mr. Stephens has multiple medical problems as described in detail in the history of present illness but stable from a cardiac standpoint with no signs of angina or CHF. He has rate controlled atrial fibrillation. PVD is stable.  As mentioned above, hematoma in his left lower extremities resolving and it appears that his hemoglobin has remained stable.  His hemoglobin was 9.2 and hematocrit 27.6 on 5/1/2021    I have continued his present medications including anticoagulation with Eliquis, antihypertensive therapy with Ziac, amlodipine, antianginal therapy with isosorbide mononitrate and statin therapy with pravastatin.  Pravastatin was refilled today.    Diagnoses and all orders for this visit:    1. Atrial fibrillation, unspecified type (CMS/HCC) (Primary)  -     ECG 12 Lead    2. Essential hypertension    3. Mixed hyperlipidemia    4. Coronary artery disease involving native coronary artery of native heart without angina pectoris        Patient's Body mass index is 28.58 kg/m². BMI is above normal parameters. Recommendations include: exercise counseling and nutrition counseling.      Josefina Stephens Jr.  reports that he has never smoked. He has never  used smokeless tobacco.    Govind Sweeney MD  5/25/2021  17:24 CDT

## 2021-05-26 ENCOUNTER — READMISSION MANAGEMENT (OUTPATIENT)
Dept: CALL CENTER | Facility: HOSPITAL | Age: 86
End: 2021-05-26

## 2021-05-26 NOTE — OUTREACH NOTE
Sepsis Week 2 Survey      Responses   [facility] patient discharged from?  [place]   Does the patient have one of the following disease processes/diagnoses(primary or secondary)?  Sepsis   Week 2 attempt successful?  No   Unsuccessful attempts  Attempt 2          Delia Mei RN

## 2021-05-27 LAB
QT INTERVAL: 330 MS
QTC INTERVAL: 405 MS

## 2021-06-04 ENCOUNTER — READMISSION MANAGEMENT (OUTPATIENT)
Dept: CALL CENTER | Facility: HOSPITAL | Age: 86
End: 2021-06-04

## 2021-06-04 NOTE — OUTREACH NOTE
Sepsis Week 3 Survey      Responses   [facility] patient discharged from?  [place]   Does the patient have one of the following disease processes/diagnoses(primary or secondary)?  Sepsis   Week 3 attempt successful?  Yes   Call start time  1333   Call end time  1334   Discharge diagnosis  Hematoma of left lower extremity,  Sepsis,  Syncope and collapse   Person spoke with today (if not patient) and relationship  Melvi/ wife   Meds reviewed with patient/caregiver?  Yes   Is the patient taking all medications as directed (includes completed medication regime)?  Yes   Has the patient kept scheduled appointments due by today?  Yes   What is the patient's perception of their health status since discharge?  Improving   Week 3 call completed?  Yes   Wrap up additional comments  Doing well.  He is back to driving. No questions or problems at this time.          Kristy Balderrama, RN

## 2021-06-14 RX ORDER — AMLODIPINE BESYLATE 10 MG/1
10 TABLET ORAL DAILY
Qty: 45 TABLET | Refills: 3 | Status: SHIPPED | OUTPATIENT
Start: 2021-06-14 | End: 2021-06-17 | Stop reason: SDUPTHER

## 2021-06-14 RX ORDER — BISOPROLOL FUMARATE AND HYDROCHLOROTHIAZIDE 10; 6.25 MG/1; MG/1
1 TABLET ORAL DAILY
Qty: 90 TABLET | Refills: 3 | Status: SHIPPED | OUTPATIENT
Start: 2021-06-14 | End: 2022-06-13

## 2021-06-14 RX ORDER — CILOSTAZOL 100 MG/1
100 TABLET ORAL 2 TIMES DAILY
Qty: 180 TABLET | Refills: 3 | Status: SHIPPED | OUTPATIENT
Start: 2021-06-14 | End: 2022-08-08

## 2021-06-14 RX ORDER — ISOSORBIDE MONONITRATE 60 MG/1
60 TABLET, EXTENDED RELEASE ORAL DAILY
Qty: 90 TABLET | Refills: 3 | Status: SHIPPED | OUTPATIENT
Start: 2021-06-14 | End: 2022-06-27

## 2021-06-17 ENCOUNTER — OFFICE VISIT (OUTPATIENT)
Dept: ORTHOPEDIC SURGERY | Facility: CLINIC | Age: 86
End: 2021-06-17

## 2021-06-17 VITALS — OXYGEN SATURATION: 96 % | HEART RATE: 94 BPM | BODY MASS INDEX: 28.49 KG/M2 | WEIGHT: 199 LBS | HEIGHT: 70 IN

## 2021-06-17 DIAGNOSIS — M79.89 PAIN AND SWELLING OF LEFT LOWER LEG: Primary | ICD-10-CM

## 2021-06-17 DIAGNOSIS — M25.561 ACUTE PAIN OF RIGHT KNEE: ICD-10-CM

## 2021-06-17 DIAGNOSIS — M25.562 LEFT KNEE PAIN, UNSPECIFIED CHRONICITY: ICD-10-CM

## 2021-06-17 DIAGNOSIS — M79.662 PAIN AND SWELLING OF LEFT LOWER LEG: Primary | ICD-10-CM

## 2021-06-17 DIAGNOSIS — M10.9 ACUTE GOUT INVOLVING TOE OF LEFT FOOT, UNSPECIFIED CAUSE: ICD-10-CM

## 2021-06-17 DIAGNOSIS — M79.672 LEFT FOOT PAIN: ICD-10-CM

## 2021-06-17 PROCEDURE — 99212 OFFICE O/P EST SF 10 MIN: CPT | Performed by: ORTHOPAEDIC SURGERY

## 2021-06-17 RX ORDER — AMLODIPINE BESYLATE 10 MG/1
10 TABLET ORAL DAILY
Qty: 45 TABLET | Refills: 3 | Status: SHIPPED | OUTPATIENT
Start: 2021-06-17 | End: 2021-06-24 | Stop reason: DRUGHIGH

## 2021-06-17 NOTE — PROGRESS NOTES
"Josefina Stephens Jr. is a 89 y.o. male returns for     Chief Complaint   Patient presents with   • Left Knee - Follow-up   • Right Knee - Follow-up   • Left Foot - Follow-up       HISTORY OF PRESENT ILLNESS:  Patient in for follow-up of left knee,right knee and left foot.  He has recently finished PT and they thought he should come back and be seen, again.   He is also concerned about a knot he still has on his leg.     Mr. Stephens has had steady improvement in his pain.  He still has intermittent swelling in the thigh but little associated pain.  He remains on his anticoagulant.     CONCURRENT MEDICAL HISTORY:    The following portions of the patient's history were reviewed and updated as appropriate: allergies, current medications, past family history, past medical history, past social history, past surgical history and problem list.         PHYSICAL EXAMINATION:       Pulse 94   Ht 177.8 cm (70\")   Wt 90.3 kg (199 lb)   SpO2 96%   BMI 28.55 kg/m²     Physical Exam is alert and in no apparent distress.    GAIT:     [x]  Normal  []  Antalgic    Assistive device: [x]  None  []  Walker     []  Crutches  []  Cane     []  Wheelchair  []  Stretcher    Ortho Exam he walks with a short stride which does not appear to be antalgic.  There is fullness over the medial aspect of the mid to distal thigh consistent with a hematoma.  There is no fullness more distally in the limb.  He also has fullness over the anterior aspect of the mid to proximal thigh.  There is no evidence of infection.            ASSESSMENT: Hematoma left thigh.  In the absence of any pain symptoms would recommend no more aggressive intervention.  If his swellings steadily resolves no further treatment is needed.  If he develops further symptoms or the swelling enlarges he will call for reevaluation and probable repeat aspiration.    Diagnoses and all orders for this visit:    Pain and swelling of left lower leg    Left foot pain    Acute pain of " right knee    Acute gout involving toe of left foot, unspecified cause    Left knee pain, unspecified chronicity          PLAN    Return if symptoms worsen or fail to improve.    Rory Mckenzie MD

## 2021-06-24 RX ORDER — AMLODIPINE BESYLATE 5 MG/1
5 TABLET ORAL DAILY
Qty: 90 TABLET | Refills: 3 | Status: SHIPPED | OUTPATIENT
Start: 2021-06-24 | End: 2022-06-13

## 2021-11-30 ENCOUNTER — OFFICE VISIT (OUTPATIENT)
Dept: CARDIOLOGY | Facility: CLINIC | Age: 86
End: 2021-11-30

## 2021-11-30 VITALS
BODY MASS INDEX: 27.77 KG/M2 | SYSTOLIC BLOOD PRESSURE: 126 MMHG | WEIGHT: 194 LBS | HEART RATE: 76 BPM | DIASTOLIC BLOOD PRESSURE: 76 MMHG | HEIGHT: 70 IN | TEMPERATURE: 97.1 F | OXYGEN SATURATION: 97 %

## 2021-11-30 DIAGNOSIS — I48.0 PAROXYSMAL ATRIAL FIBRILLATION (HCC): Chronic | ICD-10-CM

## 2021-11-30 DIAGNOSIS — E78.2 MIXED HYPERLIPIDEMIA: ICD-10-CM

## 2021-11-30 DIAGNOSIS — I25.10 CORONARY ARTERY DISEASE INVOLVING NATIVE CORONARY ARTERY OF NATIVE HEART WITHOUT ANGINA PECTORIS: Primary | ICD-10-CM

## 2021-11-30 DIAGNOSIS — I10 PRIMARY HYPERTENSION: ICD-10-CM

## 2021-11-30 PROBLEM — I48.19 ATRIAL FIBRILLATION, PERSISTENT: Status: ACTIVE | Noted: 2017-11-01

## 2021-11-30 PROCEDURE — 99213 OFFICE O/P EST LOW 20 MIN: CPT | Performed by: INTERNAL MEDICINE

## 2021-11-30 RX ORDER — ALBUTEROL SULFATE 90 UG/1
AEROSOL, METERED RESPIRATORY (INHALATION)
COMMUNITY
Start: 2021-09-13

## 2021-11-30 RX ORDER — FERROUS SULFATE 325(65) MG
1 TABLET ORAL DAILY
COMMUNITY
Start: 2021-09-14 | End: 2021-11-30

## 2021-11-30 NOTE — PROGRESS NOTES
Josefina Stephens Jr.  89 y.o. male      1. Coronary artery disease involving native coronary artery of native heart without angina pectoris    2. Paroxysmal atrial fibrillation (HCC)    3. Primary hypertension    4. Mixed hyperlipidemia        History of Present Illness  Josefina Stephens is a 89-year-old male with multiple medical issues including coronary artery disease with history of PCI with a bare-metal stent to the right coronary artery in 2000, (3.0 x 28 mm Velocity stent), permanent atrial fibrillation of unknown duration, hypertension, hyperlipidemia, peripheral vascular disease, sleep apnea, history of cerebrovascular event, prostate carcinoma.      On 4/21/2021 the patient presented following a traumatic fall resulting in a large hematoma involving the left lower extremity associated with hypotension and anemia requiring packed RBC transfusion.  He was managed by orthopedics and his symptoms are improved gradually with gradual resolution of hematoma.  Once his hemoglobin stabilized he was restarted on anticoagulation with Eliquis (CHADSVASC score is 6).     Echocardiogram in April 2021 showed:  · Left ventricular wall thickness is consistent with mild concentric hypertrophy.  · Estimated left ventricular EF = 61% Left ventricular ejection fraction appears to be 61 - 65%. Left ventricular systolic function is normal.  · Left ventricular diastolic function is consistent with (grade Ia w/high LAP) impaired relaxation.  · Left atrial volume is moderately increased.  · Mild aortic valve stenosis is present.  · Estimated right ventricular systolic pressure from tricuspid regurgitation is normal (<35 mmHg).  · Mild dilation of the proximal aorta is present.    The patient has progressed quite well since I last saw him. He has had one fall which fortunately did not result in any significant bruising or hematoma. He has been compliant with all his medications including anticoagulation with Eliquis. He remains in  atrial fibrillation which is well rate controlled.    Clinical exam today showed that his heart rate and blood pressure were in the normal range and no signs of congestive heart failure was noted.    Allergies   Allergen Reactions   • Lotrel [Amlodipine Besy-Benazepril Hcl] Swelling   • Aliskiren    • Colesevelam Diarrhea   • Contrast Dye    • Influenza Virus Vaccine Split    • Lipitor [Atorvastatin]    • Penicillins    • Ticlopidine Rash         Past Medical History:   Diagnosis Date   • Arthritis    • Atrial fibrillation (HCC)    • Callus    • Duodenal ulcer     HX   • GERD (gastroesophageal reflux disease)    • GI (gastrointestinal bleed)     HX   • Gout    • Hyperlipidemia    • Hypertension    • Prostate cancer (HCC)    • Skin cancer    • Sleep apnea    • TIA (transient ischemic attack)          Past Surgical History:   Procedure Laterality Date   • COLONOSCOPY  1955   • COLONOSCOPY N/A 7/6/2017    Procedure: COLONOSCOPY to cecum ;  Surgeon: Faisal Mac MD;  Location: [facility] ENDOSCOPY;  Service:    • CORONARY ANGIOPLASTY WITH STENT PLACEMENT     • ENDOSCOPY N/A 7/6/2017    Procedure: ESOPHAGOGASTRODUODENOSCOPY with biopsies;  Surgeon: Faisal Mac MD;  Location: [facility] ENDOSCOPY;  Service:    • HERNIA REPAIR     • PROSTATE RADIOACTIVE SEED IMPLANT     • SKIN BIOPSY     • UPPER GASTROINTESTINAL ENDOSCOPY  11/12/2008    GI bleed, peptic ulcer disease, melena, treated w/heater probe         Family History   Problem Relation Age of Onset   • Heart disease Father          Social History     Socioeconomic History   • Marital status:    Tobacco Use   • Smoking status: Never Smoker   • Smokeless tobacco: Never Used   Substance and Sexual Activity   • Alcohol use: No   • Drug use: No   • Sexual activity: Defer         Current Outpatient Medications   Medication Sig Dispense Refill   • Acetaminophen (TYLENOL PO) Take  by mouth As Needed.     • albuterol sulfate  (90 Base) MCG/ACT inhaler INHALE 2  "PUFFS BY MOUTH EVERY 4 HOURS     • amLODIPine (NORVASC) 5 MG tablet Take 1 tablet by mouth Daily. 90 tablet 3   • apixaban (ELIQUIS) 5 MG tablet tablet Take 1 tablet by mouth Every 12 (Twelve) Hours. 180 tablet 3   • aspirin (aspirin) 81 MG EC tablet Take 81 mg by mouth Daily.     • bisoprolol-hydrochlorothiazide (ZIAC) 10-6.25 MG per tablet Take 1 tablet by mouth Daily. 90 tablet 3   • cilostazol (PLETAL) 100 MG tablet Take 1 tablet by mouth 2 (Two) Times a Day. 180 tablet 3   • isosorbide mononitrate (IMDUR) 60 MG 24 hr tablet Take 1 tablet by mouth Daily. 90 tablet 3   • Multiple Vitamins-Minerals (MULTIVITAMIN ADULT PO) Take  by mouth.     • pravastatin (PRAVACHOL) 40 MG tablet Take 1 tablet by mouth Daily. 90 tablet 1   • simethicone (MYLICON) 125 MG chewable tablet Chew 125 mg.       No current facility-administered medications for this visit.         OBJECTIVE    /76 (BP Location: Left arm, Patient Position: Sitting, Cuff Size: Adult)   Pulse 76   Temp 97.1 °F (36.2 °C)   Ht 177.8 cm (70\")   Wt 88 kg (194 lb)   SpO2 97%   BMI 27.84 kg/m²         Review of Systems: The following systems are reviewed and changes noted as indicated below    Constitutional:  Denies recent weight loss, weight gain, fever or chills, no change in exercise tolerance     HENT:   hearing loss, no epistaxis, hoarseness, or difficulty speaking.     Eyes: Wears eyeglasses or contact lenses     Respiratory:  Denies dyspnea with exertion,no cough, wheezing, or hemoptysis.     Cardiovascular: Negative for palpations, chest pain, orthopnea, PND    Gastrointestinal:  Denies change in bowel habits, dyspepsia, ulcer disease, hematochezia, or melena.     Endocrine: Negative for cold intolerance, heat intolerance, polydipsia, polyphagia and polyuria.     Genitourinary: h/o prostate carcinoma      Musculoskeletal: DJD. No hematoma    Neurological: TIA    Hematological: Denies any food allergies, seasonal allergies, bleeding disorders, " or lymphadenopathy.     Psychiatric/Behavioral: Denies any history of depression, substance abuse, or change in cognitive function.       Physical Exam: The following systems were reassessed and changes noted as indicated below    Constitutional: Cooperative, alert and oriented, well-developed, well-nourished, in no acute distress.     HENT:   Head: Normocephalic, normal hair patterns, no masses or tenderness.  Ears, Nose, and Throat: No gross abnormalities. No pallor or cyanosis. Eyes: EOMS intact, PERRL, conjunctivae and lids unremarkable. Fundoscopic exam and visual fields not performed.   Neck: No palpable masses or adenopathy, no thyromegaly, no JVD, carotid pulses are full and equal bilaterally and without  Bruits.     Cardiovascular: Irregular rhythm, S1 variable, S2 normal, no S3 or S4.  No murmurs, gallops, or rubs detected.     Pulmonary/Chest: Chest: normal symmetry, normal respiratory excursion, no intercostal retraction, no use of accessory muscles.            Pulmonary: Normal breath sounds. No rales or ronchi.    Abdominal: Abdomen soft, bowel sounds normoactive, no masses, no hepatosplenomegaly, non-tender, no bruits.     Musculoskeletal: No erythema or edema.    Neurological: No gross motor or sensory deficits noted, affect appropriate, oriented to time, person, place.     Psychiatric: He has a normal mood and affect. His behavior is normal. Judgment and thought content normal.         Procedures      Lab Results   Component Value Date    WBC 8.70 05/01/2021    HGB 9.2 (L) 05/01/2021    HCT 27.6 (L) 05/01/2021    MCV 93.6 05/01/2021     05/01/2021     Lab Results   Component Value Date    GLUCOSE 151 (H) 05/01/2021    BUN 21 05/01/2021    CREATININE 1.01 05/01/2021    EGFRIFNONA 69 05/01/2021    EGFRIFAFRI 63 02/25/2019    BCR 20.8 05/01/2021    CO2 29.0 05/01/2021    CALCIUM 8.3 05/01/2021    PROTENTOTREF 8.1 02/25/2019    ALBUMIN 3.40 (L) 04/21/2021    LABIL2 1.4 02/25/2019    AST 18  04/21/2021    ALT 14 04/21/2021     No results found for: CHOL  Lab Results   Component Value Date    TRIG 101 02/25/2019    TRIG 89 05/03/2018    TRIG 111 11/01/2017     Lab Results   Component Value Date    HDL 49 02/25/2019    HDL 42 05/03/2018    HDL 48 11/01/2017     No components found for: LDLCALC  Lab Results   Component Value Date    LDL 83 02/25/2019    LDL 79 05/03/2018    LDL 88 11/01/2017     No results found for: HDLLDLRATIO  No components found for: CHOLHDL  Lab Results   Component Value Date    HGBA1C 5.70 (H) 02/25/2019     Lab Results   Component Value Date    TSH 1.420 02/25/2019           ASSESSMENT AND PLAN  Mr. Stephens has multiple medical problems as described in detail in the history of present illness but stable from a cardiac standpoint with no signs of angina or CHF. He has rate controlled atrial fibrillation. PVD is stable.  In spite of his advanced age, he is able to perform all his activities of daily living. Is mentally very alert.    I have continued his present medications including anticoagulation with Eliquis, antihypertensive therapy with Ziac, amlodipine, antianginal therapy with isosorbide mononitrate and statin therapy with pravastatin.     Diagnoses and all orders for this visit:    1. Coronary artery disease involving native coronary artery of native heart without angina pectoris (Primary)    2. Paroxysmal atrial fibrillation (HCC)    3. Primary hypertension    4. Mixed hyperlipidemia        Patient's Body mass index is 27.84 kg/m². BMI is above normal parameters. Recommendations include: exercise counseling and nutrition counseling.      Josefina Stephens Jr.  reports that he has never smoked. He has never used smokeless tobacco.    Govind Sweeney MD  11/30/2021  11:53 CST

## 2022-01-21 RX ORDER — METHYLPREDNISOLONE 4 MG/1
TABLET ORAL
Qty: 21 TABLET | Refills: 0 | Status: CANCELLED | OUTPATIENT
Start: 2022-01-21

## 2022-01-21 NOTE — TELEPHONE ENCOUNTER
DR.YELTON MARADIAGA FROM [facility] CALLED REQUESTING A REFILL OF   METHYLPREDNISOLONE (MEDROL)4MG DOSE PACK   LAST FILLED 5/6/2021    TO [facility]     I have not prescribed this for him before and I do not know why he needs this medicine    Have him consult PCP

## 2022-02-07 RX ORDER — PRAVASTATIN SODIUM 40 MG
TABLET ORAL
Qty: 90 TABLET | Refills: 3 | Status: SHIPPED | OUTPATIENT
Start: 2022-02-07 | End: 2023-02-06

## 2022-03-10 ENCOUNTER — APPOINTMENT (OUTPATIENT)
Dept: GENERAL RADIOLOGY | Facility: HOSPITAL | Age: 87
End: 2022-03-10

## 2022-03-10 ENCOUNTER — HOSPITAL ENCOUNTER (EMERGENCY)
Facility: HOSPITAL | Age: 87
Discharge: HOME | End: 2022-03-10
Attending: STUDENT IN AN ORGANIZED HEALTH CARE EDUCATION/TRAINING PROGRAM | Admitting: STUDENT IN AN ORGANIZED HEALTH CARE EDUCATION/TRAINING PROGRAM

## 2022-03-10 ENCOUNTER — APPOINTMENT (OUTPATIENT)
Dept: CT IMAGING | Facility: HOSPITAL | Age: 87
End: 2022-03-10

## 2022-03-10 VITALS
SYSTOLIC BLOOD PRESSURE: 147 MMHG | TEMPERATURE: 97.8 F | RESPIRATION RATE: 17 BRPM | OXYGEN SATURATION: 100 % | BODY MASS INDEX: 27.02 KG/M2 | WEIGHT: 193 LBS | DIASTOLIC BLOOD PRESSURE: 83 MMHG | HEIGHT: 71 IN | HEART RATE: 81 BPM

## 2022-03-10 DIAGNOSIS — T14.8XXA HEMATOMA: ICD-10-CM
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
## 2022-03-10 DIAGNOSIS — S01.81XA FACIAL LACERATION, INITIAL ENCOUNTER: ICD-10-CM

## 2022-03-10 LAB
ANION GAP SERPL CALCULATED.3IONS-SCNC: 14 MMOL/L (ref 5–15)
APTT PPP: 32.4 SECONDS (ref 20–40.3)
BASOPHILS # BLD AUTO: 0.04 10*3/MM3 (ref 0–0.2)
BASOPHILS NFR BLD AUTO: 0.7 % (ref 0–1.5)
BUN SERPL-MCNC: 20 MG/DL (ref 8–23)
BUN/CREAT SERPL: 17.1 (ref 7–25)
CALCIUM SPEC-SCNC: 9.4 MG/DL (ref 8.2–9.6)
CHLORIDE SERPL-SCNC: 101 MMOL/L (ref 98–107)
CO2 SERPL-SCNC: 24 MMOL/L (ref 22–29)
CREAT SERPL-MCNC: 1.17 MG/DL (ref 0.76–1.27)
DEPRECATED RDW RBC AUTO: 47.9 FL (ref 37–54)
EGFRCR SERPLBLD CKD-EPI 2021: 57.4 ML/MIN/1.73
EOSINOPHIL # BLD AUTO: 0.09 10*3/MM3 (ref 0–0.4)
EOSINOPHIL NFR BLD AUTO: 1.6 % (ref 0.3–6.2)
ERYTHROCYTE [DISTWIDTH] IN BLOOD BY AUTOMATED COUNT: 14.1 % (ref 12.3–15.4)
GLUCOSE SERPL-MCNC: 95 MG/DL (ref 65–99)
HCT VFR BLD AUTO: 38.2 % (ref 37.5–51)
HGB BLD-MCNC: 13.1 G/DL (ref 13–17.7)
HOLD SPECIMEN: NORMAL
IMM GRANULOCYTES # BLD AUTO: 0.02 10*3/MM3 (ref 0–0.05)
IMM GRANULOCYTES NFR BLD AUTO: 0.4 % (ref 0–0.5)
INR PPP: 1.59 (ref 0.8–1.2)
LYMPHOCYTES # BLD AUTO: 1.44 10*3/MM3 (ref 0.7–3.1)
LYMPHOCYTES NFR BLD AUTO: 25.6 % (ref 19.6–45.3)
MCH RBC QN AUTO: 32.3 PG (ref 26.6–33)
MCHC RBC AUTO-ENTMCNC: 34.3 G/DL (ref 31.5–35.7)
MCV RBC AUTO: 94.1 FL (ref 79–97)
MONOCYTES # BLD AUTO: 0.67 10*3/MM3 (ref 0.1–0.9)
MONOCYTES NFR BLD AUTO: 11.9 % (ref 5–12)
NEUTROPHILS NFR BLD AUTO: 3.37 10*3/MM3 (ref 1.7–7)
NEUTROPHILS NFR BLD AUTO: 59.8 % (ref 42.7–76)
NRBC BLD AUTO-RTO: 0 /100 WBC (ref 0–0.2)
PLATELET # BLD AUTO: 276 10*3/MM3 (ref 140–450)
PMV BLD AUTO: 9.8 FL (ref 6–12)
POTASSIUM SERPL-SCNC: 3.9 MMOL/L (ref 3.5–5.2)
PROTHROMBIN TIME: 18.7 SECONDS (ref 11.1–15.3)
RBC # BLD AUTO: 4.06 10*6/MM3 (ref 4.14–5.8)
SODIUM SERPL-SCNC: 139 MMOL/L (ref 136–145)
WBC NRBC COR # BLD: 5.63 10*3/MM3 (ref 3.4–10.8)

## 2022-03-10 PROCEDURE — 25010000002 TETANUS-DIPHTH-ACELL PERTUSSIS 5-2.5-18.5 LF-MCG/0.5 SUSPENSION PREFILLED SYRINGE: Performed by: STUDENT IN AN ORGANIZED HEALTH CARE EDUCATION/TRAINING PROGRAM

## 2022-03-10 PROCEDURE — 36415 COLL VENOUS BLD VENIPUNCTURE: CPT

## 2022-03-10 PROCEDURE — 73564 X-RAY EXAM KNEE 4 OR MORE: CPT

## 2022-03-10 PROCEDURE — 70450 CT HEAD/BRAIN W/O DYE: CPT

## 2022-03-10 PROCEDURE — 73090 X-RAY EXAM OF FOREARM: CPT

## 2022-03-10 PROCEDURE — 73552 X-RAY EXAM OF FEMUR 2/>: CPT

## 2022-03-10 PROCEDURE — 85610 PROTHROMBIN TIME: CPT | Performed by: STUDENT IN AN ORGANIZED HEALTH CARE EDUCATION/TRAINING PROGRAM

## 2022-03-10 PROCEDURE — 25010000002 MORPHINE PER 10 MG: Performed by: STUDENT IN AN ORGANIZED HEALTH CARE EDUCATION/TRAINING PROGRAM

## 2022-03-10 PROCEDURE — 73030 X-RAY EXAM OF SHOULDER: CPT

## 2022-03-10 PROCEDURE — 99283 EMERGENCY DEPT VISIT LOW MDM: CPT

## 2022-03-10 PROCEDURE — 73523 X-RAY EXAM HIPS BI 5/> VIEWS: CPT

## 2022-03-10 PROCEDURE — 72125 CT NECK SPINE W/O DYE: CPT

## 2022-03-10 PROCEDURE — 85730 THROMBOPLASTIN TIME PARTIAL: CPT | Performed by: STUDENT IN AN ORGANIZED HEALTH CARE EDUCATION/TRAINING PROGRAM

## 2022-03-10 PROCEDURE — 96374 THER/PROPH/DIAG INJ IV PUSH: CPT

## 2022-03-10 PROCEDURE — 85025 COMPLETE CBC W/AUTO DIFF WBC: CPT | Performed by: STUDENT IN AN ORGANIZED HEALTH CARE EDUCATION/TRAINING PROGRAM

## 2022-03-10 PROCEDURE — 80048 BASIC METABOLIC PNL TOTAL CA: CPT | Performed by: STUDENT IN AN ORGANIZED HEALTH CARE EDUCATION/TRAINING PROGRAM

## 2022-03-10 PROCEDURE — 73060 X-RAY EXAM OF HUMERUS: CPT

## 2022-03-10 PROCEDURE — 90471 IMMUNIZATION ADMIN: CPT | Performed by: STUDENT IN AN ORGANIZED HEALTH CARE EDUCATION/TRAINING PROGRAM

## 2022-03-10 PROCEDURE — 90715 TDAP VACCINE 7 YRS/> IM: CPT | Performed by: STUDENT IN AN ORGANIZED HEALTH CARE EDUCATION/TRAINING PROGRAM

## 2022-03-10 RX ORDER — LIDOCAINE HYDROCHLORIDE 10 MG/ML
10 INJECTION, SOLUTION EPIDURAL; INFILTRATION; INTRACAUDAL; PERINEURAL ONCE
Status: COMPLETED | OUTPATIENT
Start: 2022-03-10 | End: 2022-03-10

## 2022-03-10 RX ORDER — HYDRALAZINE HYDROCHLORIDE 20 MG/ML
20 INJECTION INTRAMUSCULAR; INTRAVENOUS ONCE
Status: DISCONTINUED | OUTPATIENT
Start: 2022-03-10 | End: 2022-03-10 | Stop reason: HOSPADM

## 2022-03-10 RX ADMIN — LIDOCAINE HYDROCHLORIDE 10 ML: 10 INJECTION, SOLUTION EPIDURAL; INFILTRATION; INTRACAUDAL; PERINEURAL at 15:04

## 2022-03-10 RX ADMIN — TETANUS TOXOID, REDUCED DIPHTHERIA TOXOID AND ACELLULAR PERTUSSIS VACCINE, ADSORBED 0.5 ML: 5; 2.5; 8; 8; 2.5 SUSPENSION INTRAMUSCULAR at 15:07

## 2022-03-10 RX ADMIN — MORPHINE SULFATE 4 MG: 4 INJECTION INTRAVENOUS at 14:02

## 2022-03-10 NOTE — ED NOTES
Per EMS they are en route with patient for fall, hit head, no loss of consciousness, is on eliquis. IV established. Pt has hx of afib, afib on cardiac monitor 70-90 BPM.

## 2022-03-10 NOTE — ED PROVIDER NOTES
Subjective   89-year-old male reportedly on Eliquis comes to the ER after mechanical fall earlier today.  He complains of a headache, he has abrasions to his lower extremities and his face.  Patient complains of neck pain, left shoulder/arm pain, left leg pain.  No weakness or numbness.  Patient did not lose consciousness.      History provided by:  Patient   used: No        Review of Systems   Constitutional: Negative for chills and fever.   Respiratory: Negative for cough and shortness of breath.    Cardiovascular: Negative for chest pain and palpitations.   Gastrointestinal: Negative for abdominal pain and nausea.   Genitourinary: Negative for dysuria and flank pain.   Musculoskeletal: Positive for neck pain. Negative for back pain.        Arm pain.  Leg pain.   Skin: Positive for wound. Negative for color change and rash.   Neurological: Negative for dizziness, syncope, facial asymmetry, weakness, light-headedness, numbness and headaches.       Past Medical History:   Diagnosis Date   • Arthritis    • Atrial fibrillation (HCC)    • Callus    • Duodenal ulcer     HX   • GERD (gastroesophageal reflux disease)    • GI (gastrointestinal bleed)     HX   • Gout    • Hyperlipidemia    • Hypertension    • Prostate cancer (HCC)    • Skin cancer    • Sleep apnea    • TIA (transient ischemic attack)        Allergies   Allergen Reactions   • Lotrel [Amlodipine Besy-Benazepril Hcl] Swelling   • Aliskiren    • Colesevelam Diarrhea   • Contrast Dye    • Influenza Virus Vaccine Split    • Lipitor [Atorvastatin]    • Penicillins    • Ticlopidine Rash       Past Surgical History:   Procedure Laterality Date   • COLONOSCOPY  1955   • COLONOSCOPY N/A 7/6/2017    Procedure: COLONOSCOPY to cecum ;  Surgeon: Faisal Mac MD;  Location: [facility] ENDOSCOPY;  Service:    • CORONARY ANGIOPLASTY WITH STENT PLACEMENT     • ENDOSCOPY N/A 7/6/2017    Procedure: ESOPHAGOGASTRODUODENOSCOPY with biopsies;  Surgeon: Faisal CULVER  MD Estefania;  Location: [facility] ENDOSCOPY;  Service:    • HERNIA REPAIR     • PROSTATE RADIOACTIVE SEED IMPLANT     • SKIN BIOPSY     • UPPER GASTROINTESTINAL ENDOSCOPY  11/12/2008    GI bleed, peptic ulcer disease, melena, treated w/heater probe       Family History   Problem Relation Age of Onset   • Heart disease Father        Social History     Socioeconomic History   • Marital status:    Tobacco Use   • Smoking status: Never Smoker   • Smokeless tobacco: Never Used   Substance and Sexual Activity   • Alcohol use: No   • Drug use: No   • Sexual activity: Defer           Objective    Vitals:    03/10/22 1224 03/10/22 1402 03/10/22 1402 03/10/22 1432   BP:  (!) 210/105 (!) 201/101 136/78   BP Location:   Right arm Right arm   Patient Position:   Lying Lying   Pulse:   81 86   Resp:   17 17   Temp: 97.7 °F (36.5 °C)  97.7 °F (36.5 °C)    TempSrc: Oral  Oral    SpO2:   94% 97%   Weight:       Height:           Physical Exam  Vitals and nursing note reviewed.   Constitutional:       General: He is not in acute distress.     Appearance: He is well-developed. He is obese. He is not ill-appearing or diaphoretic.      Interventions: Cervical collar in place.   HENT:      Head: Normocephalic. Abrasion and laceration present.   Eyes:      Extraocular Movements: Extraocular movements intact.      Conjunctiva/sclera: Conjunctivae normal.   Pulmonary:      Effort: Pulmonary effort is normal. No accessory muscle usage or respiratory distress.   Musculoskeletal:      Comments: Left shoulder/arm tenderness.  Left leg tenderness.   Skin:     General: Skin is dry.      Capillary Refill: Capillary refill takes less than 2 seconds.      Findings: Abrasion present.          Neurological:      Mental Status: He is oriented to person, place, and time.      GCS: GCS eye subscore is 4. GCS verbal subscore is 5. GCS motor subscore is 6.      Motor: No weakness.      Coordination: Coordination normal.   Psychiatric:         Behavior:  Behavior normal.         Laceration Repair    Date/Time: 3/10/2022 3:03 PM  Performed by: Jw Jung MD  Authorized by: Jw Jung MD     Consent:     Consent obtained:  Verbal    Consent given by:  Patient  Anesthesia:     Anesthesia method:  Local infiltration    Local anesthetic:  Lidocaine 1% w/o epi  Laceration details:     Location:  Scalp    Scalp location:  Frontal    Length (cm):  2  Pre-procedure details:     Preparation:  Imaging obtained to evaluate for foreign bodies  Exploration:     Hemostasis achieved with:  Direct pressure    Imaging outcome: foreign body not noted      Contaminated: no    Treatment:     Area cleansed with:  Soap and water    Amount of cleaning:  Extensive    Irrigation solution:  Sterile saline    Irrigation method:  Pressure wash    Visualized foreign bodies/material removed: no      Debridement:  None  Skin repair:     Repair method:  Sutures    Suture size:  4-0    Suture material:  Prolene    Suture technique:  Simple interrupted    Number of sutures:  3  Approximation:     Approximation:  Close  Repair type:     Repair type:  Simple  Post-procedure details:     Dressing:  Open (no dressing)    Procedure completion:  Tolerated               ED Course      Results for orders placed or performed during the hospital encounter of 03/10/22   Basic Metabolic Panel    Specimen: Arm, Right; Blood   Result Value Ref Range    Glucose 95 65 - 99 mg/dL    BUN 20 8 - 23 mg/dL    Creatinine 1.17 0.76 - 1.27 mg/dL    Sodium 139 136 - 145 mmol/L    Potassium 3.9 3.5 - 5.2 mmol/L    Chloride 101 98 - 107 mmol/L    CO2 24.0 22.0 - 29.0 mmol/L    Calcium 9.4 8.2 - 9.6 mg/dL    BUN/Creatinine Ratio 17.1 7.0 - 25.0    Anion Gap 14.0 5.0 - 15.0 mmol/L    eGFR 57.4 (L) >60.0 mL/min/1.73   aPTT    Specimen: Arm, Right; Blood   Result Value Ref Range    PTT 32.4 20.0 - 40.3 seconds   Protime-INR    Specimen: Arm, Right; Blood   Result Value Ref Range    Protime 18.7 (H) 11.1 - 15.3  Seconds    INR 1.59 (H) 0.80 - 1.20   CBC Auto Differential    Specimen: Arm, Right; Blood   Result Value Ref Range    WBC 5.63 3.40 - 10.80 10*3/mm3    RBC 4.06 (L) 4.14 - 5.80 10*6/mm3    Hemoglobin 13.1 13.0 - 17.7 g/dL    Hematocrit 38.2 37.5 - 51.0 %    MCV 94.1 79.0 - 97.0 fL    MCH 32.3 26.6 - 33.0 pg    MCHC 34.3 31.5 - 35.7 g/dL    RDW 14.1 12.3 - 15.4 %    RDW-SD 47.9 37.0 - 54.0 fl    MPV 9.8 6.0 - 12.0 fL    Platelets 276 140 - 450 10*3/mm3    Neutrophil % 59.8 42.7 - 76.0 %    Lymphocyte % 25.6 19.6 - 45.3 %    Monocyte % 11.9 5.0 - 12.0 %    Eosinophil % 1.6 0.3 - 6.2 %    Basophil % 0.7 0.0 - 1.5 %    Immature Grans % 0.4 0.0 - 0.5 %    Neutrophils, Absolute 3.37 1.70 - 7.00 10*3/mm3    Lymphocytes, Absolute 1.44 0.70 - 3.10 10*3/mm3    Monocytes, Absolute 0.67 0.10 - 0.90 10*3/mm3    Eosinophils, Absolute 0.09 0.00 - 0.40 10*3/mm3    Basophils, Absolute 0.04 0.00 - 0.20 10*3/mm3    Immature Grans, Absolute 0.02 0.00 - 0.05 10*3/mm3    nRBC 0.0 0.0 - 0.2 /100 WBC   Gold Top - SST   Result Value Ref Range    Extra Tube Hold for add-ons.      CT Head Without Contrast   Final Result   Atrophy with evidence of chronic small vessel white matter   ischemic change.   No significant focal or acute intracranial pathology appreciated.      Electronically signed by:  Girish Thompson MD  3/10/2022 1:55 PM   CST Workstation: [id number]      CT Cervical Spine Without Contrast   Final Result   No acute bony abnormality appreciated.   Other findings as above. See body of report for full details.      Electronically signed by:  Girish Thompson MD  3/10/2022 2:19 PM   CST Workstation: [id number]      XR Forearm 2 View Left   Final Result   No acute bony abnormality.   Other findings as above.      Electronically signed by:  Girish Thompson MD  3/10/2022 2:09 PM   CST Workstation: [id number]      XR Humerus Left   Final Result   No acute bony abnormality.      Electronically signed by:  Girish Thompson MD  3/10/2022 2:10 PM   CST  Workstation: [id number]      XR Shoulder 2+ View Left   Final Result   No acute bony abnormality.   Other findings as above. See body of report for details.      Electronically signed by:  Girish Thompson MD  3/10/2022 2:08 PM   CST Workstation: [id number]      XR Femur 2 View Left   Final Result   No acute bony abnormality.      Electronically signed by:  Girish Thompson MD  3/10/2022 1:57 PM   CST Workstation: [id number]      XR Knee 4+ View Right   Final Result   No acute bony abnormality appreciated.      Electronically signed by:  Girish Thompson MD  3/10/2022 1:56 PM   CST Workstation: [id number]      XR Hips Bilateral With or Without Pelvis 5 View   Final Result   No acute bony abnormality.      Electronically signed by:  Girish Thompson MD  3/10/2022 2:07 PM   CST Workstation: [id number]                MDM  Number of Diagnoses or Management Options  Facial laceration, initial encounter: new and requires workup  Fall, initial encounter: new and requires workup  Hematoma: new and requires workup  Diagnosis management comments: Vital signs are stable, afebrile.  Labs are unremarkable.  Blood pressure is improved.  X-rays and CT scans negative for acute findings.  Tetanus updated.  Facial laceration repaired.  Return precautions provided.  Recommend follow-up with PCP.      Final diagnoses:   Fall, initial encounter   Hematoma   Facial laceration, initial encounter       ED Disposition  ED Disposition     ED Disposition   Discharge    Condition   Stable    Comment   --             Leticia Field, APRN  [address] [address]  [phone]    Schedule an appointment as soon as possible for a visit in 2 days  ER follow up         Medication List      No changes were made to your prescriptions during this visit.            Jw Jung MD  03/10/22 1759

## 2022-03-10 NOTE — ED NOTES
Wound above left eyebrow dressed with telfa and a ajay wrap. ACE wrap applied to right knee. Care istructions given to pt and family. Pt and family verbalized understanding.

## 2022-03-16 ENCOUNTER — OFFICE VISIT (OUTPATIENT)
Dept: SLEEP MEDICINE | Facility: HOSPITAL | Age: 87
End: 2022-03-16

## 2022-03-16 VITALS
OXYGEN SATURATION: 95 % | HEIGHT: 71 IN | WEIGHT: 193 LBS | BODY MASS INDEX: 27.02 KG/M2 | SYSTOLIC BLOOD PRESSURE: 133 MMHG | HEART RATE: 88 BPM | DIASTOLIC BLOOD PRESSURE: 82 MMHG

## 2022-03-16 DIAGNOSIS — G47.33 OBSTRUCTIVE SLEEP APNEA, ADULT: Primary | ICD-10-CM

## 2022-03-16 PROCEDURE — 99214 OFFICE O/P EST MOD 30 MIN: CPT | Performed by: NURSE PRACTITIONER

## 2022-03-16 NOTE — PROGRESS NOTES
New Patient Sleep Medicine Consultation    Encounter Date: 3/16/2022         Patient's Primary Care Provider: Leticia Field APRN  Referring Provider: No ref. provider found  Reason for consultation/chief complaint: known GALO on CPAP    Josefina Stephens Jr. is a 89 y.o. male who admits to unrestful sleep, excessive daytime sleepiness, memory loss and restless legs at night. Most other symptoms are resolved since starting CPAP many years ago.    He denies cataplexy, sleep paralysis, or hypnagogic hallucinations. His bedtime is ~ 2200. He  falls asleep after 5-10 minutes, and is up 1 times per night. He wakes up ~ 0800. He endorses 9.5 hours of sleep. He drinks 3 cups of coffee, 0 teas, and 1 sodas per day. He drinks 0 alcoholic beverages per week. He is not a current smoker. He does not take sedatives or hypnotics. He has no sleepiness with driving. He naps/dozes off every day while in the recliner.  Patient is currently in a wheelchair today due to recently sustaining a fall with multiple injuries. His daughter is accompanying him today. Patient is usually completely independent with ADLs.    [place] - 13    Prior Sleep Testin. PSG in 2006, AHI of 41.5 ([facility])   2. CPAP titration on 2006, recommended 9 cm H2O   3. Currently on 8-12 cm H2O    PAP Data:    Time frame: 2021-03/15/2022   Compliance 98.4%  Average use on days used: 8 hrs 54 min  Percent of days with usage greater than or equal to 4 hours: 97.8%  PAP range : 8-12 cm H2O  Average 90% pressure: 10 cmH2O  Leak: 1 hour 5 minutes  Average AHI: 5.5 events/hr  Mask type: Nasal pillows - Fidelina DreamWear  DME: [employer]    Past Medical History:   Diagnosis Date   • Arthritis    • Atrial fibrillation (HCC)    • Callus    • Duodenal ulcer     HX   • GERD (gastroesophageal reflux disease)    • GI (gastrointestinal bleed)     HX   • Gout    • Hyperlipidemia    • Hypertension    • Prostate cancer (HCC)    • Skin cancer    • Sleep apnea  No new complaint  Ran out of insulin because we have been increasing her doses.  Pharmacy refuses to fill her insulin.  Her insurance does not cover the day early    NST NOTES    Patient presents for her office visit and NST.    Indication:    Pre-pregnancy diabetes mellitus.    Interpretation:    Fetal heart tones show a baseline of 150 with at least 15 bpm x 15 seconds accelerations and moderate variability.  This is reactive.  No decelerations noted.     Time:    Patient was monitored for 70 minutes for this visit    Rudy Case MD    Will try to call her insurance for insulin coverage  Insulin dosage again re-adjusted.    Back on Monday 3/18/2019          • TIA (transient ischemic attack)      Social History     Socioeconomic History   • Marital status:    Tobacco Use   • Smoking status: Never Smoker   • Smokeless tobacco: Never Used   Substance and Sexual Activity   • Alcohol use: No   • Drug use: No   • Sexual activity: Defer     Family History   Problem Relation Age of Onset   • Parkinsonism Mother    • Diabetes Father    • Heart disease Father      Prior T&A, UPPP, maxillofacial, or bariatric surgery: None  Family history of sleep disorders: None  Other family history + for: As above  Occupation: Retired - preacher, salesman,   Marital status:   Children: 2  Has 2 brothers and 1 sisters  Smoking history: smoked never    Review of Systems:  Constitutional: negative  Eyes: negative  Ears, nose, mouth, throat, and face: negative  Respiratory: negative  Cardiovascular: negative  Gastrointestinal: negative  Genitourinary:negative  Integument/breast: negative  Hematologic/lymphatic: negative  Musculoskeletal:negative  Neurological: negative  Behavioral/Psych: negative  Endocrine: negative  Allergic/Immunologic: negative   Patient advised to discuss any positive ROS with PCP.      Vitals:    03/16/22 1505   BP: 133/82   Pulse: 88   SpO2: 95%           03/16/22  1505   Weight: 87.5 kg (193 lb)       Body mass index is 26.93 kg/m². Patient's Body mass index is 26.93 kg/m². indicating that he is overweight (BMI 25-29.9). Patient's (Body mass index is 26.93 kg/m².) indicates that they are overweight with health conditions that include obstructive sleep apnea, hypertension, coronary heart disease, GERD and peripheral vascular disease . Weight is unchanged. BMI is is above average; BMI management plan is completed. I recommend portion control and increasing exercise.       Tobacco Use: Low Risk    • Smoking Tobacco Use: Never Smoker   • Smokeless Tobacco Use: Never Used         Physical Exam:        General: Alert. Cooperative. Well  "developed. No acute distress.   Head/Neck:  Normocephalic. Symmetrical. Atraumatic.     Neck circumference: 17\"             Eyes: Sclera clear. No icterus. PERRLA. Normal EOM.             Ears: No deformities. Normal hearing.             Nose: No septal deviation. No drainage.          Throat: No oral lesions. No thrush. Moist mucous membranes. Trachea midline    Tongue is normal     Dentition is fair       Pharynx: Posterior pharyngeal pillars are narrow    Mallampati score of III (soft and hard palate and base of uvula visible)    Pharynx is normal with unrermarkable tonsils   Chest Wall:  Normal shape. Symmetric expansion with respiration. No tenderness.          Lungs:  Clear to auscultation bilaterally. No wheezes. No rhonchi. No rales. Respirations regular, even and unlabored.            Heart:  Regular rhythm and normal rate. Normal S1 and S2. No murmur.     Abdomen:  Soft, non-tender and non-distended. Normal bowel sounds. No masses.  Extremities:  Moves all extremities well. No edema.           Pulses: Pulses palpable and equal bilaterally.               Skin: Dry. Intact. No bleeding. No rash.           Neuro: Moves all 4 extremities and cranial nerves grossly intact.  Psychiatric: Normal mood and affect.      Current Outpatient Medications:   •  Acetaminophen (TYLENOL PO), Take  by mouth As Needed., Disp: , Rfl:   •  albuterol sulfate  (90 Base) MCG/ACT inhaler, INHALE 2 PUFFS BY MOUTH EVERY 4 HOURS, Disp: , Rfl:   •  amLODIPine (NORVASC) 5 MG tablet, Take 1 tablet by mouth Daily., Disp: 90 tablet, Rfl: 3  •  apixaban (ELIQUIS) 5 MG tablet tablet, Take 1 tablet by mouth Every 12 (Twelve) Hours., Disp: 60 tablet, Rfl: 0  •  aspirin 81 MG EC tablet, Take 81 mg by mouth Daily., Disp: , Rfl:   •  bisoprolol-hydrochlorothiazide (ZIAC) 10-6.25 MG per tablet, Take 1 tablet by mouth Daily., Disp: 90 tablet, Rfl: 3  •  cilostazol (PLETAL) 100 MG tablet, Take 1 tablet by mouth 2 (Two) Times a Day., Disp: 180 " tablet, Rfl: 3  •  isosorbide mononitrate (IMDUR) 60 MG 24 hr tablet, Take 1 tablet by mouth Daily., Disp: 90 tablet, Rfl: 3  •  Multiple Vitamins-Minerals (MULTIVITAMIN ADULT PO), Take  by mouth., Disp: , Rfl:   •  pravastatin (PRAVACHOL) 40 MG tablet, TAKE 1 TABLET EVERY DAY, Disp: 90 tablet, Rfl: 3  •  simethicone (MYLICON) 125 MG chewable tablet, Chew 125 mg., Disp: , Rfl:     WBC   Date Value Ref Range Status   03/10/2022 5.63 3.40 - 10.80 10*3/mm3 Final   02/25/2019 5.47 3.40 - 10.80 10*3/mm3 Final   03/01/2018 5.34 4.5 - 11.0 10*3/uL Final     RBC   Date Value Ref Range Status   03/10/2022 4.06 (L) 4.14 - 5.80 10*6/mm3 Final   02/25/2019 4.75 4.14 - 5.80 10*6/mm3 Final   03/01/2018 4.66 4.5 - 5.9 10*6/uL Final     Hemoglobin   Date Value Ref Range Status   03/10/2022 13.1 13.0 - 17.7 g/dL Final   03/01/2018 14.5 13.5 - 17.5 g/dL Final     Hematocrit   Date Value Ref Range Status   03/10/2022 38.2 37.5 - 51.0 % Final   03/01/2018 45.1 41.0 - 53.0 % Final     MCV   Date Value Ref Range Status   03/10/2022 94.1 79.0 - 97.0 fL Final   03/01/2018 96.8 80.0 - 100.0 fL Final     MCH   Date Value Ref Range Status   03/10/2022 32.3 26.6 - 33.0 pg Final   03/01/2018 31.1 26.0 - 34.0 pg Final     MCHC   Date Value Ref Range Status   03/10/2022 34.3 31.5 - 35.7 g/dL Final   03/01/2018 32.2 31.0 - 37.0 g/dL Final     RDW   Date Value Ref Range Status   03/10/2022 14.1 12.3 - 15.4 % Final   03/01/2018 16.0 12.0 - 16.8 % Final     RDW-SD   Date Value Ref Range Status   03/10/2022 47.9 37.0 - 54.0 fl Final     MPV   Date Value Ref Range Status   03/10/2022 9.8 6.0 - 12.0 fL Final   03/01/2018 10.1 6.7 - 10.8 fL Final     Platelets   Date Value Ref Range Status   03/10/2022 276 140 - 450 10*3/mm3 Final   03/01/2018 256 140 - 440 10*3/uL Final     Neutrophil Rel %   Date Value Ref Range Status   03/01/2018 60.2 45 - 80 % Final     Neutrophil %   Date Value Ref Range Status   03/10/2022 59.8 42.7 - 76.0 % Final     Lymphocyte  Rel %   Date Value Ref Range Status   03/01/2018 30.0 15 - 50 % Final     Lymphocyte %   Date Value Ref Range Status   03/10/2022 25.6 19.6 - 45.3 % Final     Monocyte Rel %   Date Value Ref Range Status   03/01/2018 7.9 0 - 15 % Final     Monocyte %   Date Value Ref Range Status   03/10/2022 11.9 5.0 - 12.0 % Final     Eosinophil %   Date Value Ref Range Status   03/10/2022 1.6 0.3 - 6.2 % Final   03/01/2018 1.5 0 - 7 % Final     Basophil Rel %   Date Value Ref Range Status   03/01/2018 0.2 0 - 2 % Final     Basophil %   Date Value Ref Range Status   03/10/2022 0.7 0.0 - 1.5 % Final     Immature Grans %   Date Value Ref Range Status   03/10/2022 0.4 0.0 - 0.5 % Final   03/01/2018 0.2 (H) 0 % Final     Neutrophils Absolute   Date Value Ref Range Status   03/01/2018 3.22 2.0 - 8.8 10*3/uL Final     Neutrophils, Absolute   Date Value Ref Range Status   03/10/2022 3.37 1.70 - 7.00 10*3/mm3 Final     Lymphocytes Absolute   Date Value Ref Range Status   03/01/2018 1.60 0.7 - 5.5 10*3/uL Final     Lymphocytes, Absolute   Date Value Ref Range Status   03/10/2022 1.44 0.70 - 3.10 10*3/mm3 Final     Monocytes Absolute   Date Value Ref Range Status   03/01/2018 0.42 0.0 - 1.7 10*3/uL Final     Monocytes, Absolute   Date Value Ref Range Status   03/10/2022 0.67 0.10 - 0.90 10*3/mm3 Final     Eosinophils Absolute   Date Value Ref Range Status   03/01/2018 0.08 0.0 - 0.8 10*3/uL Final     Eosinophils, Absolute   Date Value Ref Range Status   03/10/2022 0.09 0.00 - 0.40 10*3/mm3 Final     Basophils Absolute   Date Value Ref Range Status   03/01/2018 0.01 0.0 - 0.2 10*3/uL Final     Basophils, Absolute   Date Value Ref Range Status   03/10/2022 0.04 0.00 - 0.20 10*3/mm3 Final     Immature Grans, Absolute   Date Value Ref Range Status   03/10/2022 0.02 0.00 - 0.05 10*3/mm3 Final   03/01/2018 0.01 <1 10*3/uL Final     nRBC   Date Value Ref Range Status   03/10/2022 0.0 0.0 - 0.2 /100 WBC Final     Ferritin   Date Value Ref Range  Status   02/25/2019 90.20 30.00 - 400.00 ng/mL Final     Lab Results   Component Value Date    GLUCOSE 95 03/10/2022    BUN 20 03/10/2022    CREATININE 1.17 03/10/2022    EGFRIFNONA 69 05/01/2021    EGFRIFAFRI 63 02/25/2019    BCR 17.1 03/10/2022    K 3.9 03/10/2022    CO2 24.0 03/10/2022    CALCIUM 9.4 03/10/2022    PROTENTOTREF 8.1 02/25/2019    ALBUMIN 3.40 (L) 04/21/2021    LABIL2 1.4 02/25/2019    AST 18 04/21/2021    ALT 14 04/21/2021       ASSESSMENT:  1. Obstructive sleep apnea - New (to me), no additional work-up planned (3)  1. Compliant with PAP therapy  2. Advised patient of new safety recall of [employer] CPAP/BiPAP/AVAPS machines. We discussed the risk versus benefit of continuing usage of PAP therapy. The company has recommended that patients stop usage of their current machines. Instructed patient to call [employer] ([phone]) to check if the machine is under warranty for repair or replacement. [employer] machine with serial number on website: www.[employer]/src-updates. Follow up with DME company regarding any further questions. Advised patient to avoid unapproved ozone-type CPAP . Advised to call us if any further questions or problems as well.  3. Patient wishes to continue using his current machine due to benefit of PAP therapy  4. Assist patient it machine registration today  5. Follow up in 1 year, or sooner if changes in symptoms, or if trouble with new machine once it is received      I spent 30 minutes caring for Josefina on this date of service. This time includes time spent by me in the following activities: preparing for the visit, reviewing tests, obtaining and/or reviewing a separately obtained history, performing a medically appropriate examination and/or evaluation , counseling and educating the patient/family/caregiver, documenting information in the medical record and care coordination; discussing PAP therapy, PAP compliance and PAP maintenance    RTC in 12  months. Patient agrees to return sooner if changes in symptoms.           This document has been electronically signed by JACK Dias on March 16, 2022 15:29 CDT          CC: Leticia Field APRN          No ref. provider found

## 2022-05-16 ENCOUNTER — OFFICE VISIT (OUTPATIENT)
Dept: PODIATRY | Facility: CLINIC | Age: 87
End: 2022-05-16

## 2022-05-16 VITALS — WEIGHT: 193 LBS | BODY MASS INDEX: 27.02 KG/M2 | HEART RATE: 91 BPM | OXYGEN SATURATION: 95 % | HEIGHT: 71 IN

## 2022-05-16 DIAGNOSIS — M20.41 HAMMER TOES OF BOTH FEET: ICD-10-CM

## 2022-05-16 DIAGNOSIS — M77.41 METATARSALGIA OF RIGHT FOOT: Primary | ICD-10-CM

## 2022-05-16 DIAGNOSIS — L84 FOOT CALLUS: ICD-10-CM

## 2022-05-16 DIAGNOSIS — M20.42 HAMMER TOES OF BOTH FEET: ICD-10-CM

## 2022-05-16 PROCEDURE — 99212 OFFICE O/P EST SF 10 MIN: CPT | Performed by: PODIATRIST

## 2022-05-16 NOTE — PROGRESS NOTES
Josefina Stephens Jr.  12/17/1926  89 y.o. male   PCP: Leticia Field APRN: March 2022 ( patient does not know exact date )    Patient presents to office today with a complaint of callous on the bottom of his right foot.     05/16/2022        Chief Complaint   Patient presents with   • Right Foot - Follow-up, Callouses           History of Present Illness    Josefina Stephens Jr. is a 89 y.o. male  who presents for evaluation of recurrent right forefoot pain.  He feels these are secondary to painful skin lesions in the ball of his foot.      Past Medical History:   Diagnosis Date   • Arthritis    • Atrial fibrillation (HCC)    • Callus    • Duodenal ulcer     HX   • GERD (gastroesophageal reflux disease)    • GI (gastrointestinal bleed)     HX   • Gout    • Hyperlipidemia    • Hypertension    • Prostate cancer (HCC)    • Skin cancer    • Sleep apnea    • TIA (transient ischemic attack)          Past Surgical History:   Procedure Laterality Date   • COLONOSCOPY  1955   • COLONOSCOPY N/A 7/6/2017    Procedure: COLONOSCOPY to cecum ;  Surgeon: Faisal Mac MD;  Location: [facility] ENDOSCOPY;  Service:    • CORONARY ANGIOPLASTY WITH STENT PLACEMENT     • ENDOSCOPY N/A 7/6/2017    Procedure: ESOPHAGOGASTRODUODENOSCOPY with biopsies;  Surgeon: Faisal Mac MD;  Location: [facility] ENDOSCOPY;  Service:    • HERNIA REPAIR     • PROSTATE RADIOACTIVE SEED IMPLANT     • SKIN BIOPSY     • UPPER GASTROINTESTINAL ENDOSCOPY  11/12/2008    GI bleed, peptic ulcer disease, melena, treated w/heater probe         Family History   Problem Relation Age of Onset   • Parkinsonism Mother    • Diabetes Father    • Heart disease Father          Social History     Socioeconomic History   • Marital status:    Tobacco Use   • Smoking status: Never Smoker   • Smokeless tobacco: Never Used   Substance and Sexual Activity   • Alcohol use: No   • Drug use: No   • Sexual activity: Defer         Current Outpatient Medications   Medication  "Sig Dispense Refill   • Acetaminophen (TYLENOL PO) Take  by mouth As Needed.     • albuterol sulfate  (90 Base) MCG/ACT inhaler INHALE 2 PUFFS BY MOUTH EVERY 4 HOURS     • amLODIPine (NORVASC) 5 MG tablet Take 1 tablet by mouth Daily. 90 tablet 3   • apixaban (ELIQUIS) 5 MG tablet tablet Take 1 tablet by mouth Every 12 (Twelve) Hours. 60 tablet 0   • aspirin 81 MG EC tablet Take 81 mg by mouth Daily.     • bisoprolol-hydrochlorothiazide (ZIAC) 10-6.25 MG per tablet Take 1 tablet by mouth Daily. 90 tablet 3   • cilostazol (PLETAL) 100 MG tablet Take 1 tablet by mouth 2 (Two) Times a Day. 180 tablet 3   • isosorbide mononitrate (IMDUR) 60 MG 24 hr tablet Take 1 tablet by mouth Daily. 90 tablet 3   • Multiple Vitamins-Minerals (MULTIVITAMIN ADULT PO) Take  by mouth.     • pravastatin (PRAVACHOL) 40 MG tablet TAKE 1 TABLET EVERY DAY 90 tablet 3   • simethicone (MYLICON) 125 MG chewable tablet Chew 125 mg.       No current facility-administered medications for this visit.         OBJECTIVE    Pulse 91   Ht 180.3 cm (70.98\")   Wt 87.5 kg (193 lb)   SpO2 95%   BMI 26.93 kg/m²       Review of Systems   Constitutional: Negative.    HENT: Positive for hearing loss.    Eyes: Negative.    Respiratory: Negative.    Cardiovascular: Positive for leg swelling.   Gastrointestinal: Positive for blood in stool and constipation.   Endocrine: Negative.    Genitourinary: Negative.    Musculoskeletal: Positive for arthralgias and back pain.        Foot pain, joint pain   Skin: Negative.    Allergic/Immunologic: Negative.    Neurological: Negative.    Hematological: Negative.    Psychiatric/Behavioral: Negative.          Physical Exam     Constitutional: he appears well-developed and well-nourished.   HEENT: Normocephalic. Atraumatic  CV: No tenderness. RRR  Resp: Non-labored respiration. No wheezes.   Psychiatric: he has a normal mood and affect. his   behavior is normal.      Lower Extremity Exam:  Vascular: DP/PT pulses " palpable 1+.   Negative hair growth.   No edema  Neuro: Protective sensation diminished to lesser toes, b/l.  DTRs intact  Integument: No open wounds  Right sub-fourth MTPJ hyperkeratosis pre-ulcerative lesion.  Moderate tenderness palpation.  Atrophic skin noted b/l   No masses  Webspaces c/d/i  Musculoskeletal: LE muscle strength 4/5.   Gait Normal  Semi rigid hammertoe deformity toes 2-5, b/l.  Nails 1-5 b/l thickened  Ankle ROM decreased, b/l  Positive posterior calcaneal exostosis              ASSESSMENT AND PLAN    Diagnoses and all orders for this visit:    1. Metatarsalgia of right foot (Primary)    2. Foot callus    3. Hammer toes of both feet      -Comprehensive foot and ankle exam performed  -Educated pt on diagnosis, etiology and treatment of hammertoe deformity with concurrent plantar keratomas  -Debridement of above-mentioned hyperkeratosis with 15 blade to epidermis without incident  -Recommend soft, wide toe box accommodative shoe gear.  Metatarsal pads applied  -Recheck  as needed.          This document has been electronically signed by Gerard Rowley DPM on May 16, 2022 16:42 CDT     EMR Dragon/Transcription disclaimer:   Much of this encounter note is an electronic transcription/translation of spoken language to printed text. The electronic translation of spoken language may permit erroneous, or at times, nonsensical words or phrases to be inadvertently transcribed; Although I have reviewed the note for such errors, some may still exist.    Gerard Rowley DPM  5/16/2022  16:42 CDT

## 2022-06-03 ENCOUNTER — OFFICE VISIT (OUTPATIENT)
Dept: CARDIOLOGY | Facility: CLINIC | Age: 87
End: 2022-06-03

## 2022-06-03 VITALS
TEMPERATURE: 97.7 F | HEART RATE: 69 BPM | WEIGHT: 191 LBS | HEIGHT: 71 IN | OXYGEN SATURATION: 97 % | DIASTOLIC BLOOD PRESSURE: 70 MMHG | BODY MASS INDEX: 26.74 KG/M2 | SYSTOLIC BLOOD PRESSURE: 120 MMHG

## 2022-06-03 DIAGNOSIS — I10 PRIMARY HYPERTENSION: Primary | ICD-10-CM

## 2022-06-03 DIAGNOSIS — G47.33 OSA ON CPAP: ICD-10-CM

## 2022-06-03 DIAGNOSIS — I25.10 CORONARY ARTERY DISEASE INVOLVING NATIVE CORONARY ARTERY OF NATIVE HEART WITHOUT ANGINA PECTORIS: ICD-10-CM

## 2022-06-03 DIAGNOSIS — E78.2 MIXED HYPERLIPIDEMIA: ICD-10-CM

## 2022-06-03 DIAGNOSIS — I48.19 ATRIAL FIBRILLATION, PERSISTENT: ICD-10-CM

## 2022-06-03 DIAGNOSIS — I73.9 PVD (PERIPHERAL VASCULAR DISEASE): ICD-10-CM

## 2022-06-03 DIAGNOSIS — Z99.89 OSA ON CPAP: ICD-10-CM

## 2022-06-03 PROCEDURE — 93000 ELECTROCARDIOGRAM COMPLETE: CPT | Performed by: INTERNAL MEDICINE

## 2022-06-03 PROCEDURE — 99214 OFFICE O/P EST MOD 30 MIN: CPT | Performed by: INTERNAL MEDICINE

## 2022-06-03 NOTE — PROGRESS NOTES
Josefina Stephens Jr.  89 y.o. male      1. Primary hypertension    2. Coronary artery disease involving native coronary artery of native heart without angina pectoris    3. Mixed hyperlipidemia    4. GALO on CPAP +9    5. PVD (peripheral vascular disease) (HCC)    6. Atrial fibrillation, persistent (HCC)        History of Present Illness  Josefina Stephens is a 89-year-old male with multiple medical issues including coronary artery disease with history of PCI with a bare-metal stent to the right coronary artery in 2000, (3.0 x 28 mm Velocity stent), permanent atrial fibrillation of unknown duration, hypertension, hyperlipidemia, peripheral vascular disease, sleep apnea, history of cerebrovascular event, prostate carcinoma.      On 4/21/2021 the patient presented following a traumatic fall resulting in a large hematoma involving the left lower extremity associated with hypotension and anemia requiring packed RBC transfusion.  He was managed by orthopedics and his symptoms are improved gradually with gradual resolution of hematoma.  Once his hemoglobin stabilized he was restarted on anticoagulation with Eliquis (CHADSVASC score is 6).     Echocardiogram in April 2021 showed:  · Left ventricular wall thickness is consistent with mild concentric hypertrophy.  · Estimated left ventricular EF = 61% Left ventricular ejection fraction appears to be 61 - 65%. Left ventricular systolic function is normal.  · Left ventricular diastolic function is consistent with (grade Ia w/high LAP) impaired relaxation.  · Left atrial volume is moderately increased.  · Mild aortic valve stenosis is present.  · Estimated right ventricular systolic pressure from tricuspid regurgitation is normal (<35 mmHg).  · Mild dilation of the proximal aorta is present.    The patient was seen in the emergency room in March 2022 following a fall which resulted in bruising on the right knee and face.  He did not have a syncopal episode.  Patient indicates  that his blood pressure has not been low and today's blood pressure 120/70 is the lowest that he has seen it.  He has been compliant with all his medications.    In spite of his advanced age, he is physically quite active and ambulating well.  He denied any chest pain, shortness of breath.    EKG today showed atrial fibrillation with a heart rate of 69 bpm.  Nonspecific T wave changes.    Allergies   Allergen Reactions   • Lotrel [Amlodipine Besy-Benazepril Hcl] Swelling   • Aliskiren    • Colesevelam Diarrhea   • Contrast Dye    • Influenza Virus Vaccine Split    • Lipitor [Atorvastatin]    • Penicillins    • Ticlopidine Rash         Past Medical History:   Diagnosis Date   • Arthritis    • Atrial fibrillation (HCC)    • Callus    • Duodenal ulcer     HX   • GERD (gastroesophageal reflux disease)    • GI (gastrointestinal bleed)     HX   • Gout    • Hyperlipidemia    • Hypertension    • Prostate cancer (HCC)    • Skin cancer    • Sleep apnea    • TIA (transient ischemic attack)          Past Surgical History:   Procedure Laterality Date   • COLONOSCOPY  1955   • COLONOSCOPY N/A 7/6/2017    Procedure: COLONOSCOPY to cecum ;  Surgeon: Faisal Mac MD;  Location: [facility] ENDOSCOPY;  Service:    • CORONARY ANGIOPLASTY WITH STENT PLACEMENT     • ENDOSCOPY N/A 7/6/2017    Procedure: ESOPHAGOGASTRODUODENOSCOPY with biopsies;  Surgeon: Faisal Mac MD;  Location: [facility] ENDOSCOPY;  Service:    • HERNIA REPAIR     • PROSTATE RADIOACTIVE SEED IMPLANT     • SKIN BIOPSY     • UPPER GASTROINTESTINAL ENDOSCOPY  11/12/2008    GI bleed, peptic ulcer disease, melena, treated w/heater probe         Family History   Problem Relation Age of Onset   • Parkinsonism Mother    • Diabetes Father    • Heart disease Father          Social History     Socioeconomic History   • Marital status:    Tobacco Use   • Smoking status: Never Smoker   • Smokeless tobacco: Never Used   Substance and Sexual Activity   • Alcohol use: No   •  "Drug use: No   • Sexual activity: Defer         Current Outpatient Medications   Medication Sig Dispense Refill   • Acetaminophen (TYLENOL PO) Take  by mouth As Needed.     • albuterol sulfate  (90 Base) MCG/ACT inhaler INHALE 2 PUFFS BY MOUTH EVERY 4 HOURS     • amLODIPine (NORVASC) 5 MG tablet Take 1 tablet by mouth Daily. 90 tablet 3   • apixaban (ELIQUIS) 5 MG tablet tablet Take 1 tablet by mouth Every 12 (Twelve) Hours. 60 tablet 0   • aspirin 81 MG EC tablet Take 81 mg by mouth Daily.     • bisoprolol-hydrochlorothiazide (ZIAC) 10-6.25 MG per tablet Take 1 tablet by mouth Daily. 90 tablet 3   • cilostazol (PLETAL) 100 MG tablet Take 1 tablet by mouth 2 (Two) Times a Day. 180 tablet 3   • isosorbide mononitrate (IMDUR) 60 MG 24 hr tablet Take 1 tablet by mouth Daily. 90 tablet 3   • Multiple Vitamins-Minerals (MULTIVITAMIN ADULT PO) Take  by mouth.     • pravastatin (PRAVACHOL) 40 MG tablet TAKE 1 TABLET EVERY DAY 90 tablet 3   • simethicone (MYLICON) 125 MG chewable tablet Chew 125 mg.       No current facility-administered medications for this visit.         OBJECTIVE    /70 (BP Location: Left arm, Patient Position: Sitting, Cuff Size: Adult)   Pulse 69   Temp 97.7 °F (36.5 °C)   Ht 180.3 cm (71\")   Wt 86.6 kg (191 lb)   SpO2 97%   BMI 26.64 kg/m²         Review of Systems: The following systems are reviewed and changes noted as indicated below    Constitutional:  Denies recent weight loss, weight gain, fever or chills, no change in exercise tolerance     HENT:   hearing loss, no epistaxis, hoarseness, or difficulty speaking.     Eyes: Wears eyeglasses or contact lenses     Respiratory:  Denies dyspnea with exertion,no cough, wheezing, or hemoptysis.     Cardiovascular: Negative for palpations, chest pain, orthopnea, PND    Gastrointestinal:  Denies change in bowel habits, dyspepsia, ulcer disease, hematochezia, or melena.     Endocrine: Negative for cold intolerance, heat intolerance, " polydipsia, polyphagia and polyuria.     Genitourinary: h/o prostate carcinoma      Musculoskeletal: DJD. No hematoma    Neurological: TIA    Hematological: Denies any food allergies, seasonal allergies, bleeding disorders, or lymphadenopathy.     Psychiatric/Behavioral: Denies any history of depression, substance abuse, or change in cognitive function.       Physical Exam: The following systems were reassessed and changes noted as indicated below    Constitutional: Cooperative, alert and oriented, well-developed, well-nourished, in no acute distress.     HENT:   Head: Normocephalic, normal hair patterns, no masses or tenderness.  Ears, Nose, and Throat: No gross abnormalities. No pallor or cyanosis. Eyes: EOMS intact, PERRL, conjunctivae and lids unremarkable. Fundoscopic exam and visual fields not performed.   Neck: No palpable masses or adenopathy, no thyromegaly, no JVD, carotid pulses are full and equal bilaterally and without  Bruits.     Cardiovascular: Irregular rhythm, S1 variable, S2 normal, no S3 or S4.  No murmurs, gallops, or rubs detected.     Pulmonary/Chest: Chest: normal symmetry, normal respiratory excursion, no intercostal retraction, no use of accessory muscles.            Pulmonary: Normal breath sounds. No rales or ronchi.    Abdominal: Abdomen soft, bowel sounds normoactive, no masses, no hepatosplenomegaly, non-tender, no bruits.     Musculoskeletal: No erythema or edema.    Neurological: No gross motor or sensory deficits noted, affect appropriate, oriented to time, person, place.     Psychiatric: He has a normal mood and affect. His behavior is normal. Judgment and thought content normal.         Procedures      Lab Results   Component Value Date    WBC 5.63 03/10/2022    HGB 13.1 03/10/2022    HCT 38.2 03/10/2022    MCV 94.1 03/10/2022     03/10/2022     Lab Results   Component Value Date    GLUCOSE 95 03/10/2022    BUN 20 03/10/2022    CREATININE 1.17 03/10/2022    EGFRIFNONA 69  05/01/2021    EGFRIFAFRI 63 02/25/2019    BCR 17.1 03/10/2022    CO2 24.0 03/10/2022    CALCIUM 9.4 03/10/2022    PROTENTOTREF 8.1 02/25/2019    ALBUMIN 3.40 (L) 04/21/2021    LABIL2 1.4 02/25/2019    AST 18 04/21/2021    ALT 14 04/21/2021     No results found for: CHOL  Lab Results   Component Value Date    TRIG 101 02/25/2019    TRIG 89 05/03/2018    TRIG 111 11/01/2017     Lab Results   Component Value Date    HDL 49 02/25/2019    HDL 42 05/03/2018    HDL 48 11/01/2017     No components found for: LDLCALC  Lab Results   Component Value Date    LDL 83 02/25/2019    LDL 79 05/03/2018    LDL 88 11/01/2017     No results found for: HDLLDLRATIO  No components found for: CHOLHDL  Lab Results   Component Value Date    HGBA1C 5.70 (H) 02/25/2019     Lab Results   Component Value Date    TSH 1.420 02/25/2019           ASSESSMENT AND PLAN  Mr. Stephens has multiple medical problems as described in detail in the history of present illness, and is progressing well from a cardiac standpoint.  He is well rate controlled with underlying atrial fibrillation.  He has not had any syncopal episodes.    I have continued his present medications including anticoagulation with Eliquis, antihypertensive therapy with Ziac, amlodipine, antianginal therapy with isosorbide mononitrate and statin therapy with pravastatin.   I have advised him to make sure that he takes bisoprolol/HCTZ and isosorbide mononitrate in a.m. and amlodipine in p.m. he has been advised to maintain a high fluid intake.    Diagnoses and all orders for this visit:    1. Primary hypertension (Primary)  -     ECG 12 Lead    2. Coronary artery disease involving native coronary artery of native heart without angina pectoris    3. Mixed hyperlipidemia    4. GALO on CPAP +9    5. PVD (peripheral vascular disease) (HCC)    6. Atrial fibrillation, persistent (HCC)        Patient's Body mass index is 26.64 kg/m². BMI is above normal parameters. Recommendations include: exercise  counseling and nutrition counseling.      Josefina Stephens Jr.  reports that he has never smoked. He has never used smokeless tobacco.    Govind Sweeney MD  6/3/2022  11:32 CDT

## 2022-06-05 LAB
QT INTERVAL: 388 MS
QTC INTERVAL: 415 MS

## 2022-06-13 RX ORDER — AMLODIPINE BESYLATE 5 MG/1
TABLET ORAL
Qty: 90 TABLET | Refills: 3 | Status: SHIPPED | OUTPATIENT
Start: 2022-06-13

## 2022-06-13 RX ORDER — BISOPROLOL FUMARATE AND HYDROCHLOROTHIAZIDE 10; 6.25 MG/1; MG/1
TABLET ORAL
Qty: 90 TABLET | Refills: 3 | Status: SHIPPED | OUTPATIENT
Start: 2022-06-13

## 2022-06-20 ENCOUNTER — OFFICE VISIT (OUTPATIENT)
Dept: PODIATRY | Facility: CLINIC | Age: 87
End: 2022-06-20

## 2022-06-20 VITALS — WEIGHT: 191 LBS | BODY MASS INDEX: 26.74 KG/M2 | HEIGHT: 71 IN

## 2022-06-20 DIAGNOSIS — B35.1 ONYCHOMYCOSIS: Primary | ICD-10-CM

## 2022-06-20 DIAGNOSIS — S90.221A SUBUNGUAL HEMATOMA OF RIGHT FOOT, INITIAL ENCOUNTER: ICD-10-CM

## 2022-06-20 DIAGNOSIS — L60.2 ONYCHOGRYPHOSIS: ICD-10-CM

## 2022-06-20 PROCEDURE — 99213 OFFICE O/P EST LOW 20 MIN: CPT | Performed by: PODIATRIST

## 2022-06-20 PROCEDURE — 11730 AVULSION NAIL PLATE SIMPLE 1: CPT | Performed by: PODIATRIST

## 2022-06-20 NOTE — PROGRESS NOTES
Josefina Stephens Jr.  12/17/1926  89 y.o. male   PCP: Leticia Field APRN: 06/15/2022    Patient in office for pain in right great toe.   06/20/2022        Chief Complaint   Patient presents with   • Right Foot - Follow-up           History of Present Illness    Josefina Stephens Jr. is a 89 y.o. male  who presents for evaluation of new problem right hallux nail injury.  Injury occurred 6/16/2022.    Past Medical History:   Diagnosis Date   • Arthritis    • Atrial fibrillation (HCC)    • Callus    • Duodenal ulcer     HX   • GERD (gastroesophageal reflux disease)    • GI (gastrointestinal bleed)     HX   • Gout    • Hyperlipidemia    • Hypertension    • Prostate cancer (HCC)    • Skin cancer    • Sleep apnea    • TIA (transient ischemic attack)          Past Surgical History:   Procedure Laterality Date   • COLONOSCOPY  1955   • COLONOSCOPY N/A 7/6/2017    Procedure: COLONOSCOPY to cecum ;  Surgeon: Faisal Mac MD;  Location: [facility] ENDOSCOPY;  Service:    • CORONARY ANGIOPLASTY WITH STENT PLACEMENT     • ENDOSCOPY N/A 7/6/2017    Procedure: ESOPHAGOGASTRODUODENOSCOPY with biopsies;  Surgeon: Faisal Mac MD;  Location: [facility] ENDOSCOPY;  Service:    • HERNIA REPAIR     • PROSTATE RADIOACTIVE SEED IMPLANT     • SKIN BIOPSY     • UPPER GASTROINTESTINAL ENDOSCOPY  11/12/2008    GI bleed, peptic ulcer disease, melena, treated w/heater probe         Family History   Problem Relation Age of Onset   • Parkinsonism Mother    • Diabetes Father    • Heart disease Father          Social History     Socioeconomic History   • Marital status:    Tobacco Use   • Smoking status: Never Smoker   • Smokeless tobacco: Never Used   Substance and Sexual Activity   • Alcohol use: No   • Drug use: No   • Sexual activity: Defer         Current Outpatient Medications   Medication Sig Dispense Refill   • Acetaminophen (TYLENOL PO) Take  by mouth As Needed.     • albuterol sulfate  (90 Base) MCG/ACT inhaler INHALE 2  "PUFFS BY MOUTH EVERY 4 HOURS     • amLODIPine (NORVASC) 5 MG tablet TAKE 1 TABLET EVERY DAY 90 tablet 3   • apixaban (ELIQUIS) 5 MG tablet tablet Take 1 tablet by mouth Every 12 (Twelve) Hours. 60 tablet 0   • aspirin 81 MG EC tablet Take 81 mg by mouth Daily.     • bisoprolol-hydrochlorothiazide (ZIAC) 10-6.25 MG per tablet TAKE 1 TABLET EVERY DAY 90 tablet 3   • cilostazol (PLETAL) 100 MG tablet Take 1 tablet by mouth 2 (Two) Times a Day. 180 tablet 3   • isosorbide mononitrate (IMDUR) 60 MG 24 hr tablet Take 1 tablet by mouth Daily. 90 tablet 3   • Multiple Vitamins-Minerals (MULTIVITAMIN ADULT PO) Take  by mouth.     • pravastatin (PRAVACHOL) 40 MG tablet TAKE 1 TABLET EVERY DAY 90 tablet 3   • simethicone (MYLICON) 125 MG chewable tablet Chew 125 mg.       No current facility-administered medications for this visit.         OBJECTIVE    Ht 180.3 cm (71\")   Wt 86.6 kg (191 lb)   BMI 26.64 kg/m²       Review of Systems   Constitutional: Negative.    HENT: Positive for hearing loss.    Eyes: Negative.    Respiratory: Negative.    Cardiovascular: Positive for leg swelling.   Gastrointestinal: Positive for blood in stool and constipation.   Endocrine: Negative.    Genitourinary: Negative.    Musculoskeletal: Positive for arthralgias and back pain.        Foot pain, joint pain   Skin: Negative.    Allergic/Immunologic: Negative.    Neurological: Negative.    Hematological: Negative.    Psychiatric/Behavioral: Negative.          Physical Exam     Constitutional: he appears well-developed and well-nourished.   HEENT: Normocephalic. Atraumatic  CV: No tenderness. RRR  Resp: Non-labored respiration. No wheezes.   Psychiatric: he has a normal mood and affect. his   behavior is normal.      Lower Extremity Exam:  Vascular: DP/PT pulses palpable 1+.   Negative hair growth.   No edema  Neuro: Protective sensation diminished to lesser toes, b/l.  DTRs intact  Integument: No open wounds  Right hallux nail significantly " loosened with subungual hemorrhage.  No signs of infection.  Positive tenderness palpation.  Atrophic skin noted b/l   No masses  Webspaces c/d/i  Musculoskeletal: LE muscle strength 4/5.   Gait Normal  Semi rigid hammertoe deformity toes 2-5, b/l.  Nails 1-5 b/l thickened  Ankle ROM decreased, b/l  Positive posterior calcaneal exostosis      Nail Removal    Date/Time: 6/20/2022 4:58 PM  Performed by: Gerard Rowley DPM  Authorized by: Gerard Rowley DPM   Consent: Written consent obtained.  Risks and benefits: risks, benefits and alternatives were discussed  Consent given by: patient  Location: right foot  Location details: right big toe  Anesthesia: digital block    Anesthesia:  Local Anesthetic: lidocaine 2% without epinephrine  Anesthetic total: 3 mL  Preparation: skin prepped with Betadine  Amount removed: complete  Nail matrix removed: none  Dressing: 4x4 and antibiotic ointment  Patient tolerance: patient tolerated the procedure well with no immediate complications                ASSESSMENT AND PLAN    Diagnoses and all orders for this visit:    1. Onychomycosis (Primary)    2. Onychogryphosis    3. Subungual hematoma of right foot, initial encounter    Other orders  -     Nail Removal      -Nail removal as above  -Aftercare instructions dispensed  -Recheck 2 weeks          This document has been electronically signed by Gerard Rowley DPM on June 20, 2022 16:58 CDT     EMR Dragon/Transcription disclaimer:   Much of this encounter note is an electronic transcription/translation of spoken language to printed text. The electronic translation of spoken language may permit erroneous, or at times, nonsensical words or phrases to be inadvertently transcribed; Although I have reviewed the note for such errors, some may still exist.    Gerard Rowley DPM  6/20/2022  16:58 CDT

## 2022-06-27 RX ORDER — ISOSORBIDE MONONITRATE 60 MG/1
TABLET, EXTENDED RELEASE ORAL
Qty: 90 TABLET | Refills: 3 | Status: SHIPPED | OUTPATIENT
Start: 2022-06-27

## 2022-08-08 RX ORDER — CILOSTAZOL 100 MG/1
TABLET ORAL
Qty: 180 TABLET | Refills: 3 | Status: SHIPPED | OUTPATIENT
Start: 2022-08-08

## 2022-11-08 RX ORDER — APIXABAN 5 MG/1
TABLET, FILM COATED ORAL
Qty: 180 TABLET | Refills: 1 | Status: SHIPPED | OUTPATIENT
Start: 2022-11-08 | End: 2022-11-08 | Stop reason: SDUPTHER

## 2022-12-06 ENCOUNTER — OFFICE VISIT (OUTPATIENT)
Dept: CARDIOLOGY | Facility: CLINIC | Age: 87
End: 2022-12-06

## 2022-12-06 VITALS
HEIGHT: 71 IN | WEIGHT: 191 LBS | HEART RATE: 90 BPM | TEMPERATURE: 98 F | BODY MASS INDEX: 26.74 KG/M2 | DIASTOLIC BLOOD PRESSURE: 72 MMHG | SYSTOLIC BLOOD PRESSURE: 124 MMHG | OXYGEN SATURATION: 98 %

## 2022-12-06 DIAGNOSIS — I48.19 ATRIAL FIBRILLATION, PERSISTENT: ICD-10-CM

## 2022-12-06 DIAGNOSIS — I10 PRIMARY HYPERTENSION: ICD-10-CM

## 2022-12-06 DIAGNOSIS — E78.2 MIXED HYPERLIPIDEMIA: ICD-10-CM

## 2022-12-06 DIAGNOSIS — I25.10 CORONARY ARTERY DISEASE INVOLVING NATIVE CORONARY ARTERY OF NATIVE HEART WITHOUT ANGINA PECTORIS: Primary | ICD-10-CM

## 2022-12-06 PROCEDURE — 99214 OFFICE O/P EST MOD 30 MIN: CPT | Performed by: INTERNAL MEDICINE

## 2022-12-06 RX ORDER — MONTELUKAST SODIUM 10 MG/1
TABLET ORAL
COMMUNITY
Start: 2022-09-20

## 2022-12-06 NOTE — PROGRESS NOTES
Josefina Stephens Jr.  89 y.o. male      1. Coronary artery disease involving native coronary artery of native heart without angina pectoris    2. Primary hypertension    3. Mixed hyperlipidemia    4. Atrial fibrillation, persistent (HCC)        History of Present Illness  Josefina Stephens is a 89-year-old male with multiple medical issues including coronary artery disease with history of PCI with a bare-metal stent to the right coronary artery in 2000, (3.0 x 28 mm Velocity stent), permanent atrial fibrillation of unknown duration, hypertension, hyperlipidemia, peripheral vascular disease, sleep apnea, history of cerebrovascular event, prostate carcinoma.      On 4/21/2021 the patient presented following a traumatic fall resulting in a large hematoma involving the left lower extremity associated with hypotension and anemia requiring packed RBC transfusion.  He was managed by orthopedics and his symptoms are improved gradually with gradual resolution of hematoma.  Once his hemoglobin stabilized he was restarted on anticoagulation with Eliquis (CHADSVASC score is 6).     Echocardiogram in April 2021 showed:  · Left ventricular wall thickness is consistent with mild concentric hypertrophy.  · Estimated left ventricular EF = 61% Left ventricular ejection fraction appears to be 61 - 65%. Left ventricular systolic function is normal.  · Left ventricular diastolic function is consistent with (grade Ia w/high LAP) impaired relaxation.  · Left atrial volume is moderately increased.  · Mild aortic valve stenosis is present.  · Estimated right ventricular systolic pressure from tricuspid regurgitation is normal (<35 mmHg).  · Mild dilation of the proximal aorta is present.    The patient was seen in the emergency room in March 2022 following a fall which resulted in bruising on the right knee and face.  He did not have a syncopal episode.     The patient was progressed very well since I saw him the last time and he denied any  cardiac symptoms at present time.  In spite of his advanced age he is physically quite active without any restrictions.  He has been compliant with medication.  His blood pressure and heart rate were in the acceptable normal range.      Allergies   Allergen Reactions   • Lotrel [Amlodipine Besy-Benazepril Hcl] Swelling   • Aliskiren    • Colesevelam Diarrhea   • Contrast Dye    • Influenza Virus Vaccine Split    • Lipitor [Atorvastatin]    • Penicillins    • Ticlopidine Rash         Past Medical History:   Diagnosis Date   • Arthritis    • Atrial fibrillation (HCC)    • Callus    • Duodenal ulcer     HX   • GERD (gastroesophageal reflux disease)    • GI (gastrointestinal bleed)     HX   • Gout    • Hyperlipidemia    • Hypertension    • Prostate cancer (HCC)    • Skin cancer    • Sleep apnea    • TIA (transient ischemic attack)          Past Surgical History:   Procedure Laterality Date   • COLONOSCOPY  1955   • COLONOSCOPY N/A 7/6/2017    Procedure: COLONOSCOPY to cecum ;  Surgeon: Faisal Mac MD;  Location: [facility] ENDOSCOPY;  Service:    • CORONARY ANGIOPLASTY WITH STENT PLACEMENT     • ENDOSCOPY N/A 7/6/2017    Procedure: ESOPHAGOGASTRODUODENOSCOPY with biopsies;  Surgeon: Faisal Mac MD;  Location: [facility] ENDOSCOPY;  Service:    • HERNIA REPAIR     • PROSTATE RADIOACTIVE SEED IMPLANT     • SKIN BIOPSY     • UPPER GASTROINTESTINAL ENDOSCOPY  11/12/2008    GI bleed, peptic ulcer disease, melena, treated w/heater probe         Family History   Problem Relation Age of Onset   • Parkinsonism Mother    • Diabetes Father    • Heart disease Father          Social History     Socioeconomic History   • Marital status:    Tobacco Use   • Smoking status: Never   • Smokeless tobacco: Never   Substance and Sexual Activity   • Alcohol use: No   • Drug use: No   • Sexual activity: Defer         Current Outpatient Medications   Medication Sig Dispense Refill   • Acetaminophen (TYLENOL PO) Take  by mouth As Needed.   "   • albuterol sulfate  (90 Base) MCG/ACT inhaler INHALE 2 PUFFS BY MOUTH EVERY 4 HOURS     • amLODIPine (NORVASC) 5 MG tablet TAKE 1 TABLET EVERY DAY 90 tablet 3   • apixaban (Eliquis) 5 MG tablet tablet Take 1 tablet by mouth Every 12 (Twelve) Hours. 180 tablet 3   • bisoprolol-hydrochlorothiazide (ZIAC) 10-6.25 MG per tablet TAKE 1 TABLET EVERY DAY 90 tablet 3   • cilostazol (PLETAL) 100 MG tablet TAKE 1 TABLET TWICE DAILY 180 tablet 3   • isosorbide mononitrate (IMDUR) 60 MG 24 hr tablet TAKE 1 TABLET EVERY DAY 90 tablet 3   • montelukast (SINGULAIR) 10 MG tablet      • Multiple Vitamins-Minerals (MULTIVITAMIN ADULT PO) Take  by mouth.     • pravastatin (PRAVACHOL) 40 MG tablet TAKE 1 TABLET EVERY DAY 90 tablet 3   • simethicone (MYLICON) 125 MG chewable tablet Chew 125 mg.     • aspirin 81 MG EC tablet Take 81 mg by mouth Daily.       No current facility-administered medications for this visit.         OBJECTIVE    /72 (BP Location: Left arm, Patient Position: Sitting, Cuff Size: Adult)   Pulse 90   Temp 98 °F (36.7 °C)   Ht 180.3 cm (71\")   Wt 86.6 kg (191 lb)   SpO2 98%   BMI 26.64 kg/m²         Review of Systems: The following systems are reviewed and changes noted as indicated below    Constitutional:  Denies recent weight loss, weight gain, fever or chills, no change in exercise tolerance     HENT:   hearing loss, no epistaxis, hoarseness, or difficulty speaking.     Eyes: Wears eyeglasses or contact lenses     Respiratory:  Denies dyspnea with exertion,no cough, wheezing, or hemoptysis.     Cardiovascular: Negative for palpations, chest pain, orthopnea, PND    Gastrointestinal:  Denies change in bowel habits, dyspepsia, ulcer disease, hematochezia, or melena.     Endocrine: Negative for cold intolerance, heat intolerance, polydipsia, polyphagia and polyuria.     Genitourinary: h/o prostate carcinoma      Musculoskeletal: DJD. No hematoma    Neurological: TIA    Hematological: Denies any " food allergies, seasonal allergies, bleeding disorders, or lymphadenopathy.     Psychiatric/Behavioral: Denies any history of depression, substance abuse, or change in cognitive function.       Physical Exam: The following systems were reassessed and changes noted as indicated below    Constitutional: Cooperative, alert and oriented, well-developed, well-nourished, in no acute distress.     HENT:   Head: Normocephalic, normal hair patterns, no masses or tenderness.  Ears, Nose, and Throat: No gross abnormalities. No pallor or cyanosis. Eyes: EOMS intact, PERRL, conjunctivae and lids unremarkable. Fundoscopic exam and visual fields not performed.   Neck: No palpable masses or adenopathy, no thyromegaly, no JVD, carotid pulses are full and equal bilaterally and without  Bruits.     Cardiovascular: Irregular rhythm, S1 variable, S2 normal, no S3 or S4.  No murmurs, gallops, or rubs detected.     Pulmonary/Chest: Chest: normal symmetry, normal respiratory excursion, no intercostal retraction, no use of accessory muscles.            Pulmonary: Normal breath sounds. No rales or ronchi.    Abdominal: Abdomen soft, bowel sounds normoactive, no masses, no hepatosplenomegaly, non-tender, no bruits.     Musculoskeletal: No erythema or edema.    Neurological: No gross motor or sensory deficits noted, affect appropriate, oriented to time, person, place.     Psychiatric: He has a normal mood and affect. His behavior is normal. Judgment and thought content normal.         Procedures      Lab Results   Component Value Date    WBC 5.63 03/10/2022    HGB 13.1 03/10/2022    HCT 38.2 03/10/2022    MCV 94.1 03/10/2022     03/10/2022     Lab Results   Component Value Date    GLUCOSE 95 03/10/2022    BUN 20 03/10/2022    CREATININE 1.17 03/10/2022    EGFRIFNONA 69 05/01/2021    EGFRIFAFRI 63 02/25/2019    BCR 17.1 03/10/2022    CO2 24.0 03/10/2022    CALCIUM 9.4 03/10/2022    PROTENTOTREF 8.1 02/25/2019    ALBUMIN 3.40 (L)  04/21/2021    LABIL2 1.4 02/25/2019    AST 18 04/21/2021    ALT 14 04/21/2021     No results found for: CHOL  Lab Results   Component Value Date    TRIG 101 02/25/2019    TRIG 89 05/03/2018    TRIG 111 11/01/2017     Lab Results   Component Value Date    HDL 49 02/25/2019    HDL 42 05/03/2018    HDL 48 11/01/2017     No components found for: LDLCALC  Lab Results   Component Value Date    LDL 83 02/25/2019    LDL 79 05/03/2018    LDL 88 11/01/2017     No results found for: HDLLDLRATIO  No components found for: CHOLHDL  Lab Results   Component Value Date    HGBA1C 5.70 (H) 02/25/2019     Lab Results   Component Value Date    TSH 1.420 02/25/2019           ASSESSMENT AND PLAN  Mr. Stephens has multiple medical problems as described in detail in the history of present illness.  He is progressing quite well and denied any cardiac symptoms at the present time.  He is well rate controlled with underlying atrial fibrillation.  He has not had any syncopal episodes.  No chest pain or shortness of breath is reported.    I have continued his present medications including anticoagulation with Eliquis, antihypertensive therapy with Ziac, amlodipine, antianginal therapy with isosorbide mononitrate and statin therapy with pravastatin.   I have advised him to make sure that he takes bisoprolol/HCTZ and isosorbide mononitrate in a.m. and amlodipine in p.m. he has been advised to maintain a high fluid intake.    He had lab work done by his primary care physician and November 2022 and the results were reviewed.  Lipid profile is within acceptable normal range.    Diagnoses and all orders for this visit:    1. Coronary artery disease involving native coronary artery of native heart without angina pectoris (Primary)    2. Primary hypertension    3. Mixed hyperlipidemia    4. Atrial fibrillation, persistent (HCC)        Patient's Body mass index is 26.64 kg/m². BMI is above normal parameters. Recommendations include: exercise counseling  and nutrition counseling.      Josefina Stephens Jr.  reports that he has never smoked. He has never used smokeless tobacco.    Govind Sweeney MD  12/6/2022  11:40 CST

## 2023-02-06 RX ORDER — PRAVASTATIN SODIUM 40 MG
TABLET ORAL
Qty: 90 TABLET | Refills: 3 | Status: SHIPPED | OUTPATIENT
Start: 2023-02-06

## 2023-03-10 ENCOUNTER — TRANSCRIBE ORDERS (OUTPATIENT)
Dept: LAB | Facility: HOSPITAL | Age: 88
End: 2023-03-10
Payer: MEDICARE

## 2023-03-10 DIAGNOSIS — N18.30 STAGE 3 CHRONIC KIDNEY DISEASE, UNSPECIFIED WHETHER STAGE 3A OR 3B CKD: Primary | ICD-10-CM

## 2023-03-16 ENCOUNTER — OFFICE VISIT (OUTPATIENT)
Dept: SLEEP MEDICINE | Facility: HOSPITAL | Age: 88
End: 2023-03-16
Payer: MEDICARE

## 2023-03-16 VITALS
HEART RATE: 89 BPM | DIASTOLIC BLOOD PRESSURE: 93 MMHG | HEIGHT: 71 IN | SYSTOLIC BLOOD PRESSURE: 137 MMHG | OXYGEN SATURATION: 96 % | BODY MASS INDEX: 36.82 KG/M2 | WEIGHT: 263 LBS

## 2023-03-16 DIAGNOSIS — G47.33 OBSTRUCTIVE SLEEP APNEA: Primary | ICD-10-CM

## 2023-03-16 PROCEDURE — 1159F MED LIST DOCD IN RCRD: CPT | Performed by: NURSE PRACTITIONER

## 2023-03-16 PROCEDURE — 99213 OFFICE O/P EST LOW 20 MIN: CPT | Performed by: NURSE PRACTITIONER

## 2023-03-16 PROCEDURE — 1160F RVW MEDS BY RX/DR IN RCRD: CPT | Performed by: NURSE PRACTITIONER

## 2023-03-16 RX ORDER — ALLOPURINOL 100 MG/1
TABLET ORAL
COMMUNITY
Start: 2023-03-03

## 2023-03-16 RX ORDER — CEPHALEXIN 500 MG/1
CAPSULE ORAL
COMMUNITY
Start: 2023-03-07

## 2023-03-16 RX ORDER — FUROSEMIDE 20 MG/1
TABLET ORAL
COMMUNITY
Start: 2023-03-07

## 2023-03-16 RX ORDER — COLCHICINE 0.6 MG/1
TABLET ORAL
COMMUNITY
Start: 2023-03-03

## 2023-03-16 NOTE — PROGRESS NOTES
Sleep Clinic Follow Up    Date: 3/16/2023  Primary Care Provider: Leticia Field APRN    Last office visit: 2022 (I reviewed this note)    CC: Follow up: GALO on CPAP      Interim History:  Since the last visit:    1) severe GALO -  Josefina Stephens Jr. has remained compliant with CPAP. He denies mask and machine issues, dry mouth, headaches, or pressures intolerance. He denies abnormal dreams, sleep paralysis, nasal congestion, URI sx. Patient received his replacement machine so we lost access to his modem. He did not bring his data card today, but his family member will bring it tomorrow.     2) Patient denies RLS symptoms.     Sleep Testin. PSG on 2006, AHI of 41.5 ([place])   2. CPAP titration on 2006, recommended 9 cm H2O   3. Currently on 8-12 cm H2O    PAP Data:    No access to modem - will have data card tomorrow  Mask type: Nasal pillows  DME: [employer]  Machine type: W[employer]ii DreamStation 2     Addendum 2023 1206:  PAP Data:    Time frame: 2022-03/10/2023   Compliance 97.9%  Average use on days used: 8 hrs 48 min  Percent of days with usage greater than or equal to 4 hours: 97.4%  PAP range : 8-12 cm H2O  Average 90% pressure: 9.2 cmH2O  Leak: 47 minutes  Average AHI: 4.9 events/hr      Bed time: 2230  Sleep latency: 5 minutes  Number of times awakens during the night: 1-2  Wake time: 8586-7285  Estimated total sleep time at night: 8-10 hours  Caffeine intake: 3 cups of coffee, 0 cups of tea, and 1 sodas per day  Alcohol intake: 0 drinks per week  Nap time: none/rare   Sleepiness with Driving: none     Pitts - 1  P[place]s Sleepiness Scale  Sitting and reading: Would never doze  Watching TV: Slight chance of dozing  Sitting, inactive in a public place (e.g. a theatre or a meeting): Would never doze  As a passenger in a car for an hour without a break: Would never doze  Lying down to rest in the afternoon when circumstances permit: Would never doze  Sitting  and talking to someone: Would never doze  Sitting quietly after a lunch without alcohol: Would never doze  In a car, while stopped for a few minutes in traffic: Would never doze  Total score: 1    PMHx, FH, SH reviewed and pertinent changes are: Medication changes.      Review of Systems:   Negative for chest pain, SOA, fever, chills, cough, N/V/D, abdominal pain.    Smoking:none    Josefina Stephens Jr.  reports that he has never smoked. He has never used smokeless tobacco.    Physical Exam:  Vitals:    03/16/23 1447   BP: 137/93   Pulse: 89   SpO2: 96%           03/16/23  1447   Weight: 119 kg (263 lb)     Body mass index is 36.7 kg/m². Class 2 Severe Obesity (BMI >=35 and <=39.9). Obesity-related health conditions include the following: obstructive sleep apnea, hypertension, coronary heart disease, GERD and peripheral vascular disease. Obesity is worsening. BMI is is above average; BMI management plan is completed. I recommend portion control and increasing exercise.    Gen:                No distress, conversant, pleasant, appears stated age, alert, oriented  Eyes:               Anicteric sclera, moist conjunctiva, no lid lag                           PERRL, EOMI   Heent:             NC/AT                          Oropharynx clear                          Normal hearing  Lungs:             Normal effort, non-labored breathing     CV:                  Normal S1/S2                          No lower extremity edema  ABD:               Soft, rounded, non-distended                 Psych:             Appropriate affect  Neuro:             CN 2-12 appear intact    Past Medical History:   Diagnosis Date   • Arthritis    • Atrial fibrillation (HCC)    • Callus    • Duodenal ulcer     HX   • GERD (gastroesophageal reflux disease)    • GI (gastrointestinal bleed)     HX   • Gout    • Hyperlipidemia    • Hypertension    • Prostate cancer (HCC)    • Skin cancer    • Sleep apnea    • TIA (transient ischemic attack)         Current Outpatient Medications:   •  Acetaminophen (TYLENOL PO), Take  by mouth As Needed., Disp: , Rfl:   •  albuterol sulfate  (90 Base) MCG/ACT inhaler, INHALE 2 PUFFS BY MOUTH EVERY 4 HOURS, Disp: , Rfl:   •  allopurinol (ZYLOPRIM) 100 MG tablet, , Disp: , Rfl:   •  amLODIPine (NORVASC) 5 MG tablet, TAKE 1 TABLET EVERY DAY, Disp: 90 tablet, Rfl: 3  •  apixaban (Eliquis) 5 MG tablet tablet, Take 1 tablet by mouth Every 12 (Twelve) Hours., Disp: 180 tablet, Rfl: 3  •  bisoprolol-hydrochlorothiazide (ZIAC) 10-6.25 MG per tablet, TAKE 1 TABLET EVERY DAY, Disp: 90 tablet, Rfl: 3  •  cephalexin (KEFLEX) 500 MG capsule, , Disp: , Rfl:   •  cilostazol (PLETAL) 100 MG tablet, TAKE 1 TABLET TWICE DAILY, Disp: 180 tablet, Rfl: 3  •  colchicine 0.6 MG tablet, TAKE 1 TABLET BY MOUTH EVERY 12 HOURS FOR 1 DAY, Disp: , Rfl:   •  furosemide (LASIX) 20 MG tablet, , Disp: , Rfl:   •  isosorbide mononitrate (IMDUR) 60 MG 24 hr tablet, TAKE 1 TABLET EVERY DAY, Disp: 90 tablet, Rfl: 3  •  Multiple Vitamins-Minerals (MULTIVITAMIN ADULT PO), Take  by mouth., Disp: , Rfl:   •  pravastatin (PRAVACHOL) 40 MG tablet, TAKE 1 TABLET EVERY DAY, Disp: 90 tablet, Rfl: 3  •  simethicone (MYLICON) 125 MG chewable tablet, Chew 1 tablet., Disp: , Rfl:   •  montelukast (SINGULAIR) 10 MG tablet, , Disp: , Rfl:     WBC   Date Value Ref Range Status   03/10/2022 5.63 3.40 - 10.80 10*3/mm3 Final   02/25/2019 5.47 3.40 - 10.80 10*3/mm3 Final   03/01/2018 5.34 4.5 - 11.0 10*3/uL Final     RBC   Date Value Ref Range Status   03/10/2022 4.06 (L) 4.14 - 5.80 10*6/mm3 Final   02/25/2019 4.75 4.14 - 5.80 10*6/mm3 Final   03/01/2018 4.66 4.5 - 5.9 10*6/uL Final     Hemoglobin   Date Value Ref Range Status   03/10/2022 13.1 13.0 - 17.7 g/dL Final   03/01/2018 14.5 13.5 - 17.5 g/dL Final     Hematocrit   Date Value Ref Range Status   03/10/2022 38.2 37.5 - 51.0 % Final   03/01/2018 45.1 41.0 - 53.0 % Final     MCV   Date Value Ref Range Status    03/10/2022 94.1 79.0 - 97.0 fL Final   03/01/2018 96.8 80.0 - 100.0 fL Final     MCH   Date Value Ref Range Status   03/10/2022 32.3 26.6 - 33.0 pg Final   03/01/2018 31.1 26.0 - 34.0 pg Final     MCHC   Date Value Ref Range Status   03/10/2022 34.3 31.5 - 35.7 g/dL Final   03/01/2018 32.2 31.0 - 37.0 g/dL Final     RDW   Date Value Ref Range Status   03/10/2022 14.1 12.3 - 15.4 % Final   03/01/2018 16.0 12.0 - 16.8 % Final     RDW-SD   Date Value Ref Range Status   03/10/2022 47.9 37.0 - 54.0 fl Final     MPV   Date Value Ref Range Status   03/10/2022 9.8 6.0 - 12.0 fL Final   03/01/2018 10.1 6.7 - 10.8 fL Final     Platelets   Date Value Ref Range Status   03/10/2022 276 140 - 450 10*3/mm3 Final   03/01/2018 256 140 - 440 10*3/uL Final     Neutrophil Rel %   Date Value Ref Range Status   03/01/2018 60.2 45 - 80 % Final     Neutrophil %   Date Value Ref Range Status   03/10/2022 59.8 42.7 - 76.0 % Final     Lymphocyte Rel %   Date Value Ref Range Status   03/01/2018 30.0 15 - 50 % Final     Lymphocyte %   Date Value Ref Range Status   03/10/2022 25.6 19.6 - 45.3 % Final     Monocyte Rel %   Date Value Ref Range Status   03/01/2018 7.9 0 - 15 % Final     Monocyte %   Date Value Ref Range Status   03/10/2022 11.9 5.0 - 12.0 % Final     Eosinophil %   Date Value Ref Range Status   03/10/2022 1.6 0.3 - 6.2 % Final   03/01/2018 1.5 0 - 7 % Final     Basophil Rel %   Date Value Ref Range Status   03/01/2018 0.2 0 - 2 % Final     Basophil %   Date Value Ref Range Status   03/10/2022 0.7 0.0 - 1.5 % Final     Immature Grans %   Date Value Ref Range Status   03/10/2022 0.4 0.0 - 0.5 % Final   03/01/2018 0.2 (H) 0 % Final     Neutrophils Absolute   Date Value Ref Range Status   03/01/2018 3.22 2.0 - 8.8 10*3/uL Final     Neutrophils, Absolute   Date Value Ref Range Status   03/10/2022 3.37 1.70 - 7.00 10*3/mm3 Final     Lymphocytes Absolute   Date Value Ref Range Status   03/01/2018 1.60 0.7 - 5.5 10*3/uL Final      Lymphocytes, Absolute   Date Value Ref Range Status   03/10/2022 1.44 0.70 - 3.10 10*3/mm3 Final     Monocytes Absolute   Date Value Ref Range Status   03/01/2018 0.42 0.0 - 1.7 10*3/uL Final     Monocytes, Absolute   Date Value Ref Range Status   03/10/2022 0.67 0.10 - 0.90 10*3/mm3 Final     Eosinophils Absolute   Date Value Ref Range Status   03/01/2018 0.08 0.0 - 0.8 10*3/uL Final     Eosinophils, Absolute   Date Value Ref Range Status   03/10/2022 0.09 0.00 - 0.40 10*3/mm3 Final     Basophils Absolute   Date Value Ref Range Status   03/01/2018 0.01 0.0 - 0.2 10*3/uL Final     Basophils, Absolute   Date Value Ref Range Status   03/10/2022 0.04 0.00 - 0.20 10*3/mm3 Final     Immature Grans, Absolute   Date Value Ref Range Status   03/10/2022 0.02 0.00 - 0.05 10*3/mm3 Final   03/01/2018 0.01 <1 10*3/uL Final     nRBC   Date Value Ref Range Status   03/10/2022 0.0 0.0 - 0.2 /100 WBC Final       Lab Results   Component Value Date    GLUCOSE 95 03/10/2022    BUN 20 03/10/2022    CREATININE 1.17 03/10/2022    EGFR 57.4 (L) 03/10/2022    BCR 17.1 03/10/2022    K 3.9 03/10/2022    CO2 24.0 03/10/2022    CALCIUM 9.4 03/10/2022    PROTENTOTREF 8.1 02/25/2019    ALBUMIN 3.40 (L) 04/21/2021    BILITOT 0.7 04/21/2021    AST 18 04/21/2021    ALT 14 04/21/2021       Assessment and Plan:    1. Obstructive sleep apnea - Established, stable (1)  1. Compliant with PAP therapy - patient brought data card and addendum to note made as above  2. Continue PAP as prescribed  3. Script for PAP supplies  4. Pertinent labs were reviewed as listed above  5. Return to clinic in 1 year with compliance report unless change in symptoms in interim period  2. Morbid obesity - BMI 36.7 - stable chronic illness      I spent 15 minutes caring for Josefina on this date of service. This time includes time spent by me in the following activities: preparing for the visit, reviewing tests, obtaining and/or reviewing a separately obtained history,  performing a medically appropriate examination and/or evaluation , counseling and educating the patient/family/caregiver, documenting information in the medical record and care coordination; discussing PAP therapy, PAP compliance and PAP maintenance    RTC in 12 months. Patient agrees to return sooner if changes in symptoms.        This document has been electronically signed by JACK Dias on March 16, 2023 14:52 CDT          CC: Leticia Field APRN          No ref. provider found

## 2023-03-17 DIAGNOSIS — R60.0 BILATERAL EDEMA OF LOWER EXTREMITY: Primary | ICD-10-CM

## 2023-04-11 ENCOUNTER — OFFICE VISIT (OUTPATIENT)
Dept: CARDIAC SURGERY | Facility: CLINIC | Age: 88
End: 2023-04-11
Payer: MEDICARE

## 2023-04-11 VITALS
HEART RATE: 91 BPM | OXYGEN SATURATION: 98 % | SYSTOLIC BLOOD PRESSURE: 140 MMHG | WEIGHT: 201 LBS | BODY MASS INDEX: 28.14 KG/M2 | DIASTOLIC BLOOD PRESSURE: 80 MMHG | HEIGHT: 71 IN

## 2023-04-11 DIAGNOSIS — E78.2 MIXED HYPERLIPIDEMIA: ICD-10-CM

## 2023-04-11 DIAGNOSIS — I73.9 PVD (PERIPHERAL VASCULAR DISEASE) WITH CLAUDICATION: ICD-10-CM

## 2023-04-11 DIAGNOSIS — M79.662 PAIN AND SWELLING OF LEFT LOWER LEG: ICD-10-CM

## 2023-04-11 DIAGNOSIS — I87.2 CHRONIC VENOUS INSUFFICIENCY: Primary | ICD-10-CM

## 2023-04-11 DIAGNOSIS — M79.89 PAIN AND SWELLING OF LEFT LOWER LEG: ICD-10-CM

## 2023-04-11 PROCEDURE — 99214 OFFICE O/P EST MOD 30 MIN: CPT | Performed by: NURSE PRACTITIONER

## 2023-04-12 NOTE — PROGRESS NOTES
CVTS Office Progress Note     4/12/2023    Josefina Stephens Jr.  12/17/1926    Chief Complaint:    Chief Complaint   Patient presents with   • Leg Pain       HPI:      PCP:  Leticia Field APRN  Cardiology:      89 y.o. male with HTN(stable, increased risk stroke, rupture), Hyperlipidemia(stable, increased risk cardiovascular events) and Obesity(uncontrolled, increased risk cardiovascular events) , Afib(chronic progression, increase risk stroke), carotid stenosis(increase risk stroke), PVD(new, increase risk cardiovascular events) prior bilateral carotid endarterectomy, prior PCI prostate cancer treated with radiation seed therapy.  never smoked.  Moderate pain LEFT hip x years.  Referred to vascular surgery for worsening leg swelling over the last month and a half, developed lesion on the medial area above the malleolus of the left leg, etiology of this lesion is unclear but is red erythematous and painful.  He was referred to vascular surgery to consider venous etiologies and potential treatment.  No TIA stroke amaurosis.  No MI claudication. No other associated signs, symptoms or modifying factors.     3/2021 WILLAIM:  RIGHT .98 triphasic.  LEFT .94 triphasic.  3/2021 Carotid Duplex:  BENOIT 0-49% (64/19cm/s, ratio 1.5), LICA 0-49% (65/24cm/s, ratio 1.4) antegrade verts.    4/2023 Bilateral venous: Incompetence right popliteal greater than 4 seconds, incompetence greater saphenous vein above-knee below-knee greater than 3 seconds, deep system incompetence left common femoral proximal femoral mid femoral distal femoral and popliteal greater than 2 seconds, superficial incompetence of greater saphenous vein above-knee and below-knee greater than 3 seconds right GSV measures up to 11 mm, left GSV measures up to 21 mm     4/2021 ECG:  Afib 127, QTc 467      The following portions of the patient's history were reviewed and updated as appropriate: allergies, current medications, past family history, past medical  history, past social history, past surgical history and problem list.  Recent images independently reviewed.  Available laboratory values reviewed.    PMH:  Past Medical History:   Diagnosis Date   • Arthritis    • Atrial fibrillation    • Callus    • Duodenal ulcer     HX   • GERD (gastroesophageal reflux disease)    • GI (gastrointestinal bleed)     HX   • Gout    • Hyperlipidemia    • Hypertension    • Prostate cancer    • Skin cancer    • Sleep apnea    • TIA (transient ischemic attack)      Past Surgical History:   Procedure Laterality Date   • COLONOSCOPY  1955   • COLONOSCOPY N/A 7/6/2017    Procedure: COLONOSCOPY to cecum ;  Surgeon: Faisal Mac MD;  Location: [facility] ENDOSCOPY;  Service:    • CORONARY ANGIOPLASTY WITH STENT PLACEMENT     • ENDOSCOPY N/A 7/6/2017    Procedure: ESOPHAGOGASTRODUODENOSCOPY with biopsies;  Surgeon: Faisal Mac MD;  Location: [facility] ENDOSCOPY;  Service:    • HERNIA REPAIR     • PROSTATE RADIOACTIVE SEED IMPLANT     • SKIN BIOPSY     • UPPER GASTROINTESTINAL ENDOSCOPY  11/12/2008    GI bleed, peptic ulcer disease, melena, treated w/heater probe     Family History   Problem Relation Age of Onset   • Parkinsonism Mother    • Diabetes Father    • Heart disease Father      Social History     Tobacco Use   • Smoking status: Never     Passive exposure: Never   • Smokeless tobacco: Never   Substance Use Topics   • Alcohol use: No   • Drug use: No       ALLERGIES:  Allergies   Allergen Reactions   • Lotrel [Amlodipine Besy-Benazepril Hcl] Swelling   • Aliskiren    • Colesevelam Diarrhea   • Contrast Dye (Echo Or Unknown Ct/Mr)    • Influenza Virus Vaccine Split    • Lipitor [Atorvastatin]    • Penicillins    • Ticlopidine Rash         MEDICATIONS:    Current Outpatient Medications:   •  Acetaminophen (TYLENOL PO), Take  by mouth As Needed., Disp: , Rfl:   •  albuterol sulfate  (90 Base) MCG/ACT inhaler, INHALE 2 PUFFS BY MOUTH EVERY 4 HOURS, Disp: , Rfl:   •  allopurinol  (ZYLOPRIM) 100 MG tablet, , Disp: , Rfl:   •  amLODIPine (NORVASC) 5 MG tablet, TAKE 1 TABLET EVERY DAY, Disp: 90 tablet, Rfl: 3  •  apixaban (Eliquis) 5 MG tablet tablet, Take 1 tablet by mouth Every 12 (Twelve) Hours., Disp: 180 tablet, Rfl: 3  •  bisoprolol-hydrochlorothiazide (ZIAC) 10-6.25 MG per tablet, TAKE 1 TABLET EVERY DAY, Disp: 90 tablet, Rfl: 3  •  cilostazol (PLETAL) 100 MG tablet, TAKE 1 TABLET TWICE DAILY, Disp: 180 tablet, Rfl: 3  •  furosemide (LASIX) 20 MG tablet, , Disp: , Rfl:   •  isosorbide mononitrate (IMDUR) 60 MG 24 hr tablet, TAKE 1 TABLET EVERY DAY, Disp: 90 tablet, Rfl: 3  •  montelukast (SINGULAIR) 10 MG tablet, , Disp: , Rfl:   •  Multiple Vitamins-Minerals (MULTIVITAMIN ADULT PO), Take  by mouth., Disp: , Rfl:   •  pravastatin (PRAVACHOL) 40 MG tablet, TAKE 1 TABLET EVERY DAY, Disp: 90 tablet, Rfl: 3  •  simethicone (MYLICON) 125 MG chewable tablet, Chew 1 tablet., Disp: , Rfl:       Review of Systems   Constitutional: Negative for chills, decreased appetite, fever and weight loss.   HENT: Negative for congestion, nosebleeds and sore throat.    Eyes: Negative for blurred vision, visual disturbance and visual halos.   Cardiovascular: Positive for leg swelling. Negative for chest pain and dyspnea on exertion.   Respiratory: Positive for shortness of breath. Negative for cough, sputum production and wheezing.    Endocrine: Negative for cold intolerance and polyuria.   Hematologic/Lymphatic: Negative for bleeding problem. Does not bruise/bleed easily.   Skin: Positive for color change, poor wound healing and unusual hair distribution. Negative for flushing and nail changes.   Musculoskeletal: Positive for arthritis, back pain and joint pain.   Gastrointestinal: Negative for bloating, abdominal pain, hematemesis, melena, nausea and vomiting.   Genitourinary: Negative for flank pain and hematuria.   Neurological: Negative for brief paralysis, difficulty with concentration, focal weakness,  "light-headedness, loss of balance, numbness, paresthesias and weakness.   Psychiatric/Behavioral: Negative for altered mental status, depression, substance abuse and suicidal ideas.   Allergic/Immunologic: Negative for hives and persistent infections.         Vitals:    04/11/23 1356   BP: 140/80   BP Location: Left arm   Cuff Size: Adult   Pulse: 91   SpO2: 98%   Weight: 91.2 kg (201 lb)   Height: 180.3 cm (71\")     Physical Exam  Vitals reviewed.   HENT:      Head: Normocephalic.      Mouth/Throat:      Mouth: Mucous membranes are moist.   Eyes:      Pupils: Pupils are equal, round, and reactive to light.   Cardiovascular:      Rate and Rhythm: Normal rate.      Pulses:           Carotid pulses are 2+ on the right side and 2+ on the left side.       Radial pulses are 2+ on the right side and 2+ on the left side.        Dorsalis pedis pulses are 1+ on the right side and 1+ on the left side.        Posterior tibial pulses are 1+ on the right side and 1+ on the left side.   Pulmonary:      Effort: Pulmonary effort is normal.      Breath sounds: Normal breath sounds.   Abdominal:      General: Abdomen is flat.   Musculoskeletal:         General: Tenderness (l ankle) present. Normal range of motion.      Right lower leg: Edema present.      Left lower leg: Edema present.   Skin:     General: Skin is warm and dry.      Capillary Refill: Capillary refill takes 2 to 3 seconds.      Findings: Erythema present.      Comments: Left malleolus erythema, scabbed lesion approximately 5 cm in length   Neurological:      Mental Status: He is alert. Mental status is at baseline.         Assessment & Plan     Independent Review of Studies    1. Chronic venous insufficiency with possible left venous ulceration malleolus  Patient placed in bilateral calamine compression wrap to control edema.  Followed by graduated compression with co-band.  Will plan to change neda boots in two days, patient instructed to remove neda-boots prior to " visit and shower.     Evaluate response on Friday    Plan to transition to compression stockings possibly Friday.    Significant bilateral deep system and superficial venous reflux.  If he fails compressive therapy we will plan follow-up with Dr. Mitchell to consider possible venous intervention if indicated although likely of less benefit given he has deep and superficial reflux.    2. Pain and swelling of left lower leg  As above    3. PVD (peripheral vascular disease) with claudication  Obtain WILLIAM to rule out arterial insufficiency as potential cause for lesion in question    Remains on Eliquis, statin    4. Mixed hyperlipidemia  Lipid-lowering therapy has been proven beneficial in patients with cardio-vascular disease. Current guidelines recommend statin treatment for all patients with PAD,CAD and carotid stenosis. Statins are beneficial in preventing cardiovascular events, increasing functional capacity and lower the risk of adverse limb loss in PAD. Statins decrease the progression of plaque formation and may improve peripheral vessel lining, and aid in reversing atherosclerosis.    Lab Results   Component Value Date    CHLPL 152 02/25/2019    TRIG 101 02/25/2019    HDL 49 02/25/2019    LDL 83 02/25/2019         Detailed discussion regarding risks, benefits, and treatment plan. Images independently reviewed. Patient understands, agrees, and wishes to proceed with plan.       This document has been electronically signed by FERNANDO BurgosCNP-BC @  On April 12, 2023 15:44 CDJUSTUS

## 2023-04-14 ENCOUNTER — OFFICE VISIT (OUTPATIENT)
Dept: CARDIAC SURGERY | Facility: CLINIC | Age: 88
End: 2023-04-14
Payer: MEDICARE

## 2023-04-14 VITALS
DIASTOLIC BLOOD PRESSURE: 80 MMHG | OXYGEN SATURATION: 98 % | SYSTOLIC BLOOD PRESSURE: 120 MMHG | HEIGHT: 71 IN | BODY MASS INDEX: 28 KG/M2 | HEART RATE: 90 BPM | WEIGHT: 200 LBS

## 2023-04-14 DIAGNOSIS — I83.023 VENOUS ULCER OF ANKLE, LEFT: Primary | ICD-10-CM

## 2023-04-14 DIAGNOSIS — E78.2 MIXED HYPERLIPIDEMIA: ICD-10-CM

## 2023-04-14 DIAGNOSIS — L97.329 VENOUS ULCER OF ANKLE, LEFT: Primary | ICD-10-CM

## 2023-04-14 DIAGNOSIS — I73.9 PVD (PERIPHERAL VASCULAR DISEASE): Primary | ICD-10-CM

## 2023-04-14 DIAGNOSIS — I73.9 PVD (PERIPHERAL VASCULAR DISEASE): ICD-10-CM

## 2023-04-14 PROCEDURE — 99213 OFFICE O/P EST LOW 20 MIN: CPT | Performed by: NURSE PRACTITIONER

## 2023-04-14 RX ORDER — PENTOXIFYLLINE 400 MG/1
400 TABLET, EXTENDED RELEASE ORAL 2 TIMES DAILY WITH MEALS
Qty: 60 TABLET | Refills: 2 | Status: SHIPPED | OUTPATIENT
Start: 2023-04-14

## 2023-04-17 NOTE — PROGRESS NOTES
CVTS Office Progress Note     4/17/2023    Josefina Stephens Jr.  12/17/1926    Chief Complaint:    Chief Complaint   Patient presents with   • Peripheral Vascular Disease       HPI:      PCP:  Leticia Field APRN  Cardiology:      89 y.o. male with HTN(stable, increased risk stroke, rupture), Hyperlipidemia(stable, increased risk cardiovascular events) and Obesity(uncontrolled, increased risk cardiovascular events) , Afib(chronic progression, increase risk stroke), carotid stenosis(increase risk stroke), PVD(new, increase risk cardiovascular events) prior bilateral carotid endarterectomy, prior PCI prostate cancer treated with radiation seed therapy.  never smoked.  Moderate pain LEFT hip x years.  Referred to vascular surgery for worsening leg swelling over the last month and a half, developed lesion on the medial area above the malleolus of the left leg, etiology of this lesion is unclear but is red erythematous and painful.  He was referred to vascular surgery to consider venous etiologies and potential treatment.  Placed in Unna boot therapy over the week returns today in follow-up with WILLIAM, leg is less swollen.  No TIA stroke amaurosis.  No MI claudication. No other associated signs, symptoms or modifying factors.     3/2021 WILLIAM:  RIGHT .98 triphasic.  LEFT .94 triphasic.  3/2021 Carotid Duplex:  BENOIT 0-49% (64/19cm/s, ratio 1.5), LICA 0-49% (65/24cm/s, ratio 1.4) antegrade verts.  4/2023 WILLIAM: Right 0.89 triphasic popliteal biphasic distal.  Left 0.75 triphasic popliteal biphasic distal    4/2023 Bilateral venous: Incompetence right popliteal greater than 4 seconds, incompetence greater saphenous vein above-knee below-knee greater than 3 seconds, deep system incompetence left common femoral proximal femoral mid femoral distal femoral and popliteal greater than 2 seconds, superficial incompetence of greater saphenous vein above-knee and below-knee greater than 3 seconds right GSV measures up to 11 mm,  left GSV measures up to 21 mm     4/2021 ECG:  Afib 127, QTc 467      The following portions of the patient's history were reviewed and updated as appropriate: allergies, current medications, past family history, past medical history, past social history, past surgical history and problem list.  Recent images independently reviewed.  Available laboratory values reviewed.    PMH:  Past Medical History:   Diagnosis Date   • Arthritis    • Atrial fibrillation    • Callus    • Duodenal ulcer     HX   • GERD (gastroesophageal reflux disease)    • GI (gastrointestinal bleed)     HX   • Gout    • Hyperlipidemia    • Hypertension    • Prostate cancer    • Skin cancer    • Sleep apnea    • TIA (transient ischemic attack)      Past Surgical History:   Procedure Laterality Date   • COLONOSCOPY  1955   • COLONOSCOPY N/A 7/6/2017    Procedure: COLONOSCOPY to cecum ;  Surgeon: Faisal Mac MD;  Location: [facility] ENDOSCOPY;  Service:    • CORONARY ANGIOPLASTY WITH STENT PLACEMENT     • ENDOSCOPY N/A 7/6/2017    Procedure: ESOPHAGOGASTRODUODENOSCOPY with biopsies;  Surgeon: Faisal Mac MD;  [facility] ENDOSCOPY;  Service:    • HERNIA REPAIR     • PROSTATE RADIOACTIVE SEED IMPLANT     • SKIN BIOPSY     • UPPER GASTROINTESTINAL ENDOSCOPY  11/12/2008    GI bleed, peptic ulcer disease, melena, treated w/heater probe     Family History   Problem Relation Age of Onset   • Parkinsonism Mother    • Diabetes Father    • Heart disease Father      Social History     Tobacco Use   • Smoking status: Never     Passive exposure: Never   • Smokeless tobacco: Never   Substance Use Topics   • Alcohol use: No   • Drug use: No       ALLERGIES:  Allergies   Allergen Reactions   • Lotrel [Amlodipine Besy-Benazepril Hcl] Swelling   • Aliskiren    • Colesevelam Diarrhea   • Contrast Dye (Echo Or Unknown Ct/Mr)    • Influenza Virus Vaccine Split    • Lipitor [Atorvastatin]    • Penicillins    • Ticlopidine Rash         MEDICATIONS:    Current  Outpatient Medications:   •  Acetaminophen (TYLENOL PO), Take  by mouth As Needed., Disp: , Rfl:   •  albuterol sulfate  (90 Base) MCG/ACT inhaler, INHALE 2 PUFFS BY MOUTH EVERY 4 HOURS, Disp: , Rfl:   •  allopurinol (ZYLOPRIM) 100 MG tablet, , Disp: , Rfl:   •  amLODIPine (NORVASC) 5 MG tablet, TAKE 1 TABLET EVERY DAY, Disp: 90 tablet, Rfl: 3  •  apixaban (Eliquis) 5 MG tablet tablet, Take 1 tablet by mouth Every 12 (Twelve) Hours., Disp: 180 tablet, Rfl: 3  •  bisoprolol-hydrochlorothiazide (ZIAC) 10-6.25 MG per tablet, TAKE 1 TABLET EVERY DAY, Disp: 90 tablet, Rfl: 3  •  furosemide (LASIX) 20 MG tablet, , Disp: , Rfl:   •  isosorbide mononitrate (IMDUR) 60 MG 24 hr tablet, TAKE 1 TABLET EVERY DAY, Disp: 90 tablet, Rfl: 3  •  montelukast (SINGULAIR) 10 MG tablet, , Disp: , Rfl:   •  Multiple Vitamins-Minerals (MULTIVITAMIN ADULT PO), Take  by mouth., Disp: , Rfl:   •  pravastatin (PRAVACHOL) 40 MG tablet, TAKE 1 TABLET EVERY DAY, Disp: 90 tablet, Rfl: 3  •  simethicone (MYLICON) 125 MG chewable tablet, Chew 1 tablet., Disp: , Rfl:   •  pentoxifylline (TRENtal) 400 MG CR tablet, Take 1 tablet by mouth 2 (Two) Times a Day With Meals., Disp: 60 tablet, Rfl: 2      Review of Systems   Constitutional: Negative for chills, decreased appetite, fever and weight loss.   HENT: Negative for congestion, nosebleeds and sore throat.    Eyes: Negative for blurred vision, visual disturbance and visual halos.   Cardiovascular: Positive for leg swelling. Negative for chest pain and dyspnea on exertion.   Respiratory: Positive for shortness of breath. Negative for cough, sputum production and wheezing.    Endocrine: Negative for cold intolerance and polyuria.   Hematologic/Lymphatic: Negative for bleeding problem. Does not bruise/bleed easily.   Skin: Positive for color change, poor wound healing and unusual hair distribution. Negative for flushing and nail changes.   Musculoskeletal: Positive for arthritis, back pain and  "joint pain.   Gastrointestinal: Negative for bloating, abdominal pain, hematemesis, melena, nausea and vomiting.   Genitourinary: Negative for flank pain and hematuria.   Neurological: Negative for brief paralysis, difficulty with concentration, focal weakness, light-headedness, loss of balance, numbness, paresthesias and weakness.   Psychiatric/Behavioral: Negative for altered mental status, depression, substance abuse and suicidal ideas.   Allergic/Immunologic: Negative for hives and persistent infections.         Vitals:    04/14/23 1451   BP: 120/80   BP Location: Left arm   Pulse: 90   SpO2: 98%   Weight: 90.7 kg (200 lb)   Height: 180.3 cm (71\")     Physical Exam  Vitals reviewed.   HENT:      Head: Normocephalic.      Mouth/Throat:      Mouth: Mucous membranes are moist.   Eyes:      Pupils: Pupils are equal, round, and reactive to light.   Cardiovascular:      Rate and Rhythm: Normal rate.      Pulses:           Carotid pulses are 2+ on the right side and 2+ on the left side.       Radial pulses are 2+ on the right side and 2+ on the left side.        Dorsalis pedis pulses are 1+ on the right side and 1+ on the left side.        Posterior tibial pulses are 1+ on the right side and 1+ on the left side.   Pulmonary:      Effort: Pulmonary effort is normal.      Breath sounds: Normal breath sounds.   Abdominal:      General: Abdomen is flat.   Musculoskeletal:         General: Tenderness (l ankle) present. Normal range of motion.      Right lower leg: Edema present.      Left lower leg: Edema present.   Skin:     General: Skin is warm and dry.      Capillary Refill: Capillary refill takes 2 to 3 seconds.      Findings: Erythema present.      Comments: Left malleolus erythema, scabbed lesion approximately 5 cm in length   Neurological:      Mental Status: He is alert. Mental status is at baseline.         Assessment & Plan     Independent Review of Studies    1. Chronic venous insufficiency with possible left " venous ulceration malleolus  Patient placed in unilateral calamine compression wrap to control edema.  Followed by graduated compression with co-band.      Remove on Monday and transition to compression stockings.     Significant bilateral deep system and superficial venous reflux.  Plan follow up in one month.  If no improvement will plan to review venous ultrasound for possible Endo venous intervention versus pneumatic leg pump therapy    Recheck in 4 weeks    2. Pain and swelling of left lower leg  As above    3. PVD (peripheral vascular disease) with claudication  Mild reduction in right lower extremity perfusion.  Moderate reduction in left lower extremity.  Waveforms are consistent with distal angiopathy of the left leg.  Has concurrent venous ulceration of the left ankle may consider arteriogram should wound not heal however I feel he has adequate perfusion to heal wounds at this time.    Eliquis, statin, Trental.    4. Mixed hyperlipidemia  Lipid-lowering therapy has been proven beneficial in patients with cardio-vascular disease. Current guidelines recommend statin treatment for all patients with PAD,CAD and carotid stenosis. Statins are beneficial in preventing cardiovascular events, increasing functional capacity and lower the risk of adverse limb loss in PAD. Statins decrease the progression of plaque formation and may improve peripheral vessel lining, and aid in reversing atherosclerosis.    Lab Results   Component Value Date    CHLPL 152 02/25/2019    TRIG 101 02/25/2019    HDL 49 02/25/2019    LDL 83 02/25/2019     5. LEFT Venous Ulcer  Recommend referral to wound care for ongoing monitoring and treatment.    Compression -20-30 after removing unaboot on Monday.    Start Trental twice daily    Detailed discussion regarding risks, benefits, and treatment plan. Images independently reviewed. Patient understands, agrees, and wishes to proceed with plan.       This document has been electronically signed  by JACINTA Burgos-BC @  On April 17, 2023 09:57 CDT

## 2023-04-24 ENCOUNTER — OFFICE VISIT (OUTPATIENT)
Dept: WOUND CARE | Facility: HOSPITAL | Age: 88
End: 2023-04-24
Payer: MEDICARE

## 2023-04-24 ENCOUNTER — OUTSIDE FACILITY SERVICE (OUTPATIENT)
Dept: WOUND CARE | Facility: HOSPITAL | Age: 88
End: 2023-04-24
Payer: MEDICARE

## 2023-04-24 PROCEDURE — G0463 HOSPITAL OUTPT CLINIC VISIT: HCPCS

## 2023-05-01 ENCOUNTER — OFFICE VISIT (OUTPATIENT)
Dept: WOUND CARE | Facility: HOSPITAL | Age: 88
End: 2023-05-01
Payer: MEDICARE

## 2023-05-01 ENCOUNTER — OUTSIDE FACILITY SERVICE (OUTPATIENT)
Dept: WOUND CARE | Facility: HOSPITAL | Age: 88
End: 2023-05-01
Payer: MEDICARE

## 2023-05-08 ENCOUNTER — OFFICE VISIT (OUTPATIENT)
Dept: CARDIAC SURGERY | Facility: CLINIC | Age: 88
End: 2023-05-08
Payer: MEDICARE

## 2023-05-08 VITALS
BODY MASS INDEX: 28 KG/M2 | WEIGHT: 200 LBS | DIASTOLIC BLOOD PRESSURE: 78 MMHG | HEIGHT: 71 IN | OXYGEN SATURATION: 98 % | SYSTOLIC BLOOD PRESSURE: 122 MMHG | HEART RATE: 95 BPM

## 2023-05-08 DIAGNOSIS — E78.2 MIXED HYPERLIPIDEMIA: Primary | ICD-10-CM

## 2023-05-08 DIAGNOSIS — I87.2 CHRONIC VENOUS INSUFFICIENCY: ICD-10-CM

## 2023-05-08 DIAGNOSIS — I73.9 PVD (PERIPHERAL VASCULAR DISEASE) WITH CLAUDICATION: ICD-10-CM

## 2023-05-08 PROCEDURE — 99214 OFFICE O/P EST MOD 30 MIN: CPT | Performed by: NURSE PRACTITIONER

## 2023-05-09 NOTE — PROGRESS NOTES
CVTS Office Progress Note     5/9/2023    Josefina Stephens Jr.  12/17/1926    Chief Complaint:    Chief Complaint   Patient presents with   • Peripheral Vascular Disease   • Chronic Venous Insufficiency       HPI:      PCP:  Leticia Field APRN  Cardiology:      89 y.o. male with HTN(stable, increased risk stroke, rupture), Hyperlipidemia(stable, increased risk cardiovascular events) and Obesity(uncontrolled, increased risk cardiovascular events) , Afib(chronic progression, increase risk stroke), carotid stenosis(increase risk stroke), PVD(new, increase risk cardiovascular events) prior bilateral carotid endarterectomy, prior PCI prostate cancer treated with radiation seed therapy.  never smoked.  Moderate pain LEFT hip x years.  Referred to vascular surgery for worsening leg swelling over the last month and a half, developed lesion on the medial area above the malleolus of the left leg, etiology of this lesion is unclear but is red erythematous and painful.  He was referred to vascular surgery to consider venous etiologies and potential treatment.  Placed in [place] boot therapy over the week returns today in follow-up with WILLIAM, leg is less swollen, following up with wound care.  No TIA stroke amaurosis.  No MI claudication. No other associated signs, symptoms or modifying factors.     3/2021 WILLIAM:  RIGHT .98 triphasic.  LEFT .94 triphasic.  3/2021 Carotid Duplex:  BENOIT 0-49% (64/19cm/s, ratio 1.5), LICA 0-49% (65/24cm/s, ratio 1.4) antegrade verts.  4/2023 WILLIAM: Right 0.89 triphasic popliteal biphasic distal.  Left 0.75 triphasic popliteal biphasic distal    4/2023 Bilateral venous: Incompetence right popliteal greater than 4 seconds, incompetence greater saphenous vein above-knee below-knee greater than 3 seconds, deep system incompetence left common femoral proximal femoral mid femoral distal femoral and popliteal greater than 2 seconds, superficial incompetence of greater saphenous vein above-knee and  below-knee greater than 3 seconds right GSV measures up to 11 mm, left GSV measures up to 21 mm     4/2021 ECG:  Afib 127, QTc 467      The following portions of the patient's history were reviewed and updated as appropriate: allergies, current medications, past family history, past medical history, past social history, past surgical history and problem list.  Recent images independently reviewed.  Available laboratory values reviewed.    PMH:  Past Medical History:   Diagnosis Date   • Arthritis    • Atrial fibrillation    • Callus    • Duodenal ulcer     HX   • GERD (gastroesophageal reflux disease)    • GI (gastrointestinal bleed)     HX   • Gout    • Hyperlipidemia    • Hypertension    • Prostate cancer    • Skin cancer    • Sleep apnea    • TIA (transient ischemic attack)      Past Surgical History:   Procedure Laterality Date   • COLONOSCOPY  1955   • COLONOSCOPY N/A 7/6/2017    Procedure: COLONOSCOPY to cecum ;  Surgeon: Faisal Mac MD;  Location: [facility] ENDOSCOPY;  Service:    • CORONARY ANGIOPLASTY WITH STENT PLACEMENT     • ENDOSCOPY N/A 7/6/2017    Procedure: ESOPHAGOGASTRODUODENOSCOPY with biopsies;  Surgeon: Faisal Mac MD;  Location: [facility] ENDOSCOPY;  Service:    • HERNIA REPAIR     • PROSTATE RADIOACTIVE SEED IMPLANT     • SKIN BIOPSY     • UPPER GASTROINTESTINAL ENDOSCOPY  11/12/2008    GI bleed, peptic ulcer disease, melena, treated w/heater probe     Family History   Problem Relation Age of Onset   • Parkinsonism Mother    • Diabetes Father    • Heart disease Father      Social History     Tobacco Use   • Smoking status: Never     Passive exposure: Never   • Smokeless tobacco: Never   Substance Use Topics   • Alcohol use: No   • Drug use: No       ALLERGIES:  Allergies   Allergen Reactions   • Lotrel [Amlodipine Besy-Benazepril Hcl] Swelling   • Aliskiren    • Colesevelam Diarrhea   • Contrast Dye (Echo Or Unknown Ct/Mr)    • Influenza Virus Vaccine Split    • Lipitor [Atorvastatin]    •  Penicillins    • Ticlopidine Rash         MEDICATIONS:    Current Outpatient Medications:   •  Acetaminophen (TYLENOL PO), Take  by mouth As Needed., Disp: , Rfl:   •  albuterol sulfate  (90 Base) MCG/ACT inhaler, INHALE 2 PUFFS BY MOUTH EVERY 4 HOURS, Disp: , Rfl:   •  allopurinol (ZYLOPRIM) 100 MG tablet, , Disp: , Rfl:   •  amLODIPine (NORVASC) 5 MG tablet, TAKE 1 TABLET EVERY DAY, Disp: 90 tablet, Rfl: 3  •  apixaban (Eliquis) 5 MG tablet tablet, Take 1 tablet by mouth Every 12 (Twelve) Hours., Disp: 180 tablet, Rfl: 3  •  bisoprolol-hydrochlorothiazide (ZIAC) 10-6.25 MG per tablet, TAKE 1 TABLET EVERY DAY, Disp: 90 tablet, Rfl: 3  •  furosemide (LASIX) 20 MG tablet, , Disp: , Rfl:   •  isosorbide mononitrate (IMDUR) 60 MG 24 hr tablet, TAKE 1 TABLET EVERY DAY, Disp: 90 tablet, Rfl: 3  •  montelukast (SINGULAIR) 10 MG tablet, , Disp: , Rfl:   •  Multiple Vitamins-Minerals (MULTIVITAMIN ADULT PO), Take  by mouth., Disp: , Rfl:   •  pentoxifylline (TRENtal) 400 MG CR tablet, Take 1 tablet by mouth 2 (Two) Times a Day With Meals., Disp: 60 tablet, Rfl: 2  •  pravastatin (PRAVACHOL) 40 MG tablet, TAKE 1 TABLET EVERY DAY, Disp: 90 tablet, Rfl: 3  •  simethicone (MYLICON) 125 MG chewable tablet, Chew 1 tablet., Disp: , Rfl:       Review of Systems   Constitutional: Negative for chills, decreased appetite, fever and weight loss.   HENT: Negative for congestion, nosebleeds and sore throat.    Eyes: Negative for blurred vision, visual disturbance and visual halos.   Cardiovascular: Positive for leg swelling (improving). Negative for chest pain and dyspnea on exertion.   Respiratory: Negative for cough, shortness of breath, sputum production and wheezing.    Endocrine: Negative for cold intolerance and polyuria.   Hematologic/Lymphatic: Negative for bleeding problem. Does not bruise/bleed easily.   Skin: Positive for color change, poor wound healing and unusual hair distribution. Negative for flushing and nail  "changes.   Musculoskeletal: Positive for arthritis, back pain and joint pain.   Gastrointestinal: Negative for bloating, abdominal pain, hematemesis, melena, nausea and vomiting.   Genitourinary: Negative for flank pain and hematuria.   Neurological: Negative for brief paralysis, difficulty with concentration, focal weakness, light-headedness, loss of balance, numbness, paresthesias and weakness.   Psychiatric/Behavioral: Negative for altered mental status, depression, substance abuse and suicidal ideas.   Allergic/Immunologic: Negative for hives and persistent infections.         Vitals:    05/08/23 1528   BP: 122/78   BP Location: Left arm   Pulse: 95   SpO2: 98%   Weight: 90.7 kg (200 lb)   Height: 180.3 cm (71\")     Physical Exam  Vitals reviewed.   HENT:      Head: Normocephalic.      Mouth/Throat:      Mouth: Mucous membranes are moist.   Eyes:      Pupils: Pupils are equal, round, and reactive to light.   Cardiovascular:      Rate and Rhythm: Normal rate.      Pulses:           Carotid pulses are 2+ on the right side and 2+ on the left side.       Radial pulses are 2+ on the right side and 2+ on the left side.        Dorsalis pedis pulses are 1+ on the right side and 1+ on the left side.        Posterior tibial pulses are 1+ on the right side and 1+ on the left side.   Pulmonary:      Effort: Pulmonary effort is normal.      Breath sounds: Normal breath sounds.   Abdominal:      General: Abdomen is flat.   Musculoskeletal:         General: Tenderness (l ankle) present. Normal range of motion.      Right lower leg: Edema present.      Left lower leg: Edema present.   Skin:     General: Skin is warm and dry.      Capillary Refill: Capillary refill takes 2 to 3 seconds.      Findings: Erythema present.      Comments: Left malleolus erythema, scabbed lesion approximately 5 cm in length   Neurological:      Mental Status: He is alert. Mental status is at baseline.         Assessment & Plan     Independent Review " of Studies    1. Chronic venous insufficiency with possible left venous ulceration malleolus  Patient placed in unilateral calamine compression wrap to control edema.  Followed by graduated compression with co-band.      Wounds dressed with PolyMem.    Edema has significantly improved    Following up with wound care has plans to obtain pneumatic compressive therapy.  Wednesday.    3. PVD (peripheral vascular disease) with claudication  Mild reduction in right lower extremity perfusion.  Moderate reduction in left lower extremity.  Waveforms are consistent with distal angiopathy of the left leg.      Has concurrent venous ulceration of the left ankle may consider arteriogram should wound not heal however I feel he has adequate perfusion to heal wounds at this time.    Eliquis, statin, Trental.    Repeat WILLIAM in 6-month    4. Mixed hyperlipidemia  Lipid-lowering therapy has been proven beneficial in patients with cardio-vascular disease. Current guidelines recommend statin treatment for all patients with PAD,CAD and carotid stenosis. Statins are beneficial in preventing cardiovascular events, increasing functional capacity and lower the risk of adverse limb loss in PAD. Statins decrease the progression of plaque formation and may improve peripheral vessel lining, and aid in reversing atherosclerosis.    Lab Results   Component Value Date    CHLPL 152 02/25/2019    TRIG 101 02/25/2019    HDL 49 02/25/2019    LDL 83 02/25/2019         Detailed discussion regarding risks, benefits, and treatment plan. Images independently reviewed. Patient understands, agrees, and wishes to proceed with plan.       This document has been electronically signed by GAIL Burgos  On May 9, 2023 10:45 CDT

## 2023-05-10 ENCOUNTER — OFFICE VISIT (OUTPATIENT)
Dept: WOUND CARE | Facility: HOSPITAL | Age: 88
End: 2023-05-10
Payer: MEDICARE

## 2023-05-10 ENCOUNTER — OUTSIDE FACILITY SERVICE (OUTPATIENT)
Dept: WOUND CARE | Facility: HOSPITAL | Age: 88
End: 2023-05-10
Payer: MEDICARE

## 2023-05-15 ENCOUNTER — OFFICE VISIT (OUTPATIENT)
Dept: WOUND CARE | Facility: HOSPITAL | Age: 88
End: 2023-05-15
Payer: MEDICARE

## 2023-05-15 ENCOUNTER — OUTSIDE FACILITY SERVICE (OUTPATIENT)
Dept: WOUND CARE | Facility: HOSPITAL | Age: 88
End: 2023-05-15
Payer: MEDICARE

## 2023-05-24 ENCOUNTER — OFFICE VISIT (OUTPATIENT)
Dept: WOUND CARE | Facility: HOSPITAL | Age: 88
End: 2023-05-24
Payer: MEDICARE

## 2023-05-24 ENCOUNTER — OUTSIDE FACILITY SERVICE (OUTPATIENT)
Dept: WOUND CARE | Facility: HOSPITAL | Age: 88
End: 2023-05-24

## 2023-05-31 ENCOUNTER — OUTSIDE FACILITY SERVICE (OUTPATIENT)
Dept: WOUND CARE | Facility: HOSPITAL | Age: 88
End: 2023-05-31
Payer: MEDICARE

## 2023-05-31 ENCOUNTER — OFFICE VISIT (OUTPATIENT)
Dept: WOUND CARE | Facility: HOSPITAL | Age: 88
End: 2023-05-31

## 2023-05-31 PROCEDURE — G0463 HOSPITAL OUTPT CLINIC VISIT: HCPCS

## 2023-06-05 ENCOUNTER — OFFICE VISIT (OUTPATIENT)
Dept: WOUND CARE | Facility: HOSPITAL | Age: 88
End: 2023-06-05
Payer: MEDICARE

## 2023-06-05 ENCOUNTER — OUTSIDE FACILITY SERVICE (OUTPATIENT)
Dept: WOUND CARE | Facility: HOSPITAL | Age: 88
End: 2023-06-05
Payer: MEDICARE

## 2023-06-05 PROCEDURE — G0463 HOSPITAL OUTPT CLINIC VISIT: HCPCS

## 2023-06-06 ENCOUNTER — OFFICE VISIT (OUTPATIENT)
Dept: CARDIOLOGY | Facility: CLINIC | Age: 88
End: 2023-06-06
Payer: MEDICARE

## 2023-06-06 VITALS
TEMPERATURE: 97.7 F | BODY MASS INDEX: 27.58 KG/M2 | OXYGEN SATURATION: 94 % | WEIGHT: 197 LBS | HEIGHT: 71 IN | SYSTOLIC BLOOD PRESSURE: 130 MMHG | DIASTOLIC BLOOD PRESSURE: 76 MMHG

## 2023-06-06 DIAGNOSIS — E78.2 MIXED HYPERLIPIDEMIA: ICD-10-CM

## 2023-06-06 DIAGNOSIS — I10 PRIMARY HYPERTENSION: ICD-10-CM

## 2023-06-06 DIAGNOSIS — I73.9 PVD (PERIPHERAL VASCULAR DISEASE): ICD-10-CM

## 2023-06-06 DIAGNOSIS — I48.19 ATRIAL FIBRILLATION, PERSISTENT: ICD-10-CM

## 2023-06-06 DIAGNOSIS — I25.10 CORONARY ARTERY DISEASE INVOLVING NATIVE CORONARY ARTERY OF NATIVE HEART WITHOUT ANGINA PECTORIS: Primary | ICD-10-CM

## 2023-06-06 LAB
QT INTERVAL: 390 MS
QTC INTERVAL: 429 MS

## 2023-06-06 PROCEDURE — 93000 ELECTROCARDIOGRAM COMPLETE: CPT | Performed by: INTERNAL MEDICINE

## 2023-06-06 PROCEDURE — 1159F MED LIST DOCD IN RCRD: CPT | Performed by: INTERNAL MEDICINE

## 2023-06-06 PROCEDURE — 1160F RVW MEDS BY RX/DR IN RCRD: CPT | Performed by: INTERNAL MEDICINE

## 2023-06-06 PROCEDURE — 99214 OFFICE O/P EST MOD 30 MIN: CPT | Performed by: INTERNAL MEDICINE

## 2023-06-06 RX ORDER — FERROUS SULFATE 325(65) MG
1 TABLET ORAL DAILY
COMMUNITY
Start: 2023-05-17

## 2023-06-06 NOTE — PROGRESS NOTES
Josefina Stephens Jr.  89 y.o. male      1. Coronary artery disease involving native coronary artery of native heart without angina pectoris    2. Atrial fibrillation, persistent    3. Mixed hyperlipidemia    4. Primary hypertension    5. PVD (peripheral vascular disease)        History of Present Illness  Josefina Stephens is a 89-year-old male with multiple medical issues including coronary artery disease with history of PCI with a bare-metal stent to the right coronary artery in 2000, (3.0 x 28 mm Velocity stent), permanent atrial fibrillation of unknown duration, hypertension, hyperlipidemia, peripheral vascular disease, sleep apnea, history of cerebrovascular event, prostate carcinoma.      On 4/21/2021 the patient presented following a traumatic fall resulting in a large hematoma involving the left lower extremity associated with hypotension and anemia requiring packed RBC transfusion.  He was managed by orthopedics and his symptoms are improved gradually with gradual resolution of hematoma.  Once his hemoglobin stabilized he was restarted on anticoagulation with Eliquis (CHADSVASC score is 6).     Echocardiogram in April 2021 showed:  Left ventricular wall thickness is consistent with mild concentric hypertrophy.  Estimated left ventricular EF = 61% Left ventricular ejection fraction appears to be 61 - 65%. Left ventricular systolic function is normal.  Left ventricular diastolic function is consistent with (grade Ia w/high LAP) impaired relaxation.  Left atrial volume is moderately increased.  Mild aortic valve stenosis is present.  Estimated right ventricular systolic pressure from tricuspid regurgitation is normal (<35 mmHg).  Mild dilation of the proximal aorta is present.    The patient was seen in the emergency room in March 2022 following a fall which resulted in bruising on the right knee and face.  He did not have a syncopal episode.     He denied any cardiac symptoms at this time and has not had any  more falls.  He has been ambulating in spite of his advanced age.  He is being treated by vascular surgery for a nonhealing venous ulcer in the lower extremities.    EKG today showed atrial fibrillation with controlled ventricular response.  Heart rate around 73 bpm.  Minimal nonspecific T wave changes.      Allergies   Allergen Reactions    Lotrel [Amlodipine Besy-Benazepril Hcl] Swelling    Aliskiren     Colesevelam Diarrhea    Contrast Dye (Echo Or Unknown Ct/Mr)     Influenza Virus Vaccine Split     Lipitor [Atorvastatin]     Penicillins     Ticlopidine Rash         Past Medical History:   Diagnosis Date    Arthritis     Atrial fibrillation     Callus     Duodenal ulcer     HX    GERD (gastroesophageal reflux disease)     GI (gastrointestinal bleed)     HX    Gout     Hyperlipidemia     Hypertension     Prostate cancer     Skin cancer     Sleep apnea     TIA (transient ischemic attack)          Past Surgical History:   Procedure Laterality Date    COLONOSCOPY  1955    COLONOSCOPY N/A 7/6/2017    Procedure: COLONOSCOPY to cecum ;  Surgeon: Faisal Mac MD;  Location: [facility] ENDOSCOPY;  Service:     CORONARY ANGIOPLASTY WITH STENT PLACEMENT      ENDOSCOPY N/A 7/6/2017    Procedure: ESOPHAGOGASTRODUODENOSCOPY with biopsies;  Surgeon: Faisal Mac MD;  Location: [facility] ENDOSCOPY;  Service:     HERNIA REPAIR      PROSTATE RADIOACTIVE SEED IMPLANT      SKIN BIOPSY      UPPER GASTROINTESTINAL ENDOSCOPY  11/12/2008    GI bleed, peptic ulcer disease, melena, treated w/heater probe         Family History   Problem Relation Age of Onset    Parkinsonism Mother     Diabetes Father     Heart disease Father          Social History     Socioeconomic History    Marital status:    Tobacco Use    Smoking status: Never     Passive exposure: Never    Smokeless tobacco: Never   Substance and Sexual Activity    Alcohol use: No    Drug use: No    Sexual activity: Defer         Current Outpatient Medications   Medication  "Sig Dispense Refill    Acetaminophen (TYLENOL PO) Take  by mouth As Needed.      albuterol sulfate  (90 Base) MCG/ACT inhaler INHALE 2 PUFFS BY MOUTH EVERY 4 HOURS      allopurinol (ZYLOPRIM) 100 MG tablet       amLODIPine (NORVASC) 5 MG tablet TAKE 1 TABLET EVERY DAY 90 tablet 3    apixaban (Eliquis) 5 MG tablet tablet Take 1 tablet by mouth Every 12 (Twelve) Hours. 180 tablet 3    bisoprolol-hydrochlorothiazide (ZIAC) 10-6.25 MG per tablet TAKE 1 TABLET EVERY DAY 90 tablet 3    FeroSul 325 (65 Fe) MG tablet Take 1 tablet by mouth Daily.      furosemide (LASIX) 20 MG tablet       isosorbide mononitrate (IMDUR) 60 MG 24 hr tablet TAKE 1 TABLET EVERY DAY 90 tablet 3    montelukast (SINGULAIR) 10 MG tablet       Multiple Vitamins-Minerals (MULTIVITAMIN ADULT PO) Take  by mouth.      pentoxifylline (TRENtal) 400 MG CR tablet Take 1 tablet by mouth 2 (Two) Times a Day With Meals. 60 tablet 2    pravastatin (PRAVACHOL) 40 MG tablet TAKE 1 TABLET EVERY DAY 90 tablet 3    simethicone (MYLICON) 125 MG chewable tablet Chew 1 tablet.       No current facility-administered medications for this visit.         OBJECTIVE    /76 (BP Location: Left arm, Patient Position: Sitting, Cuff Size: Adult)   Temp 97.7 °F (36.5 °C)   Ht 180.3 cm (71\")   Wt 89.4 kg (197 lb)   SpO2 94%   BMI 27.48 kg/m²         Review of Systems: The following systems are reviewed and changes noted as indicated below    Constitutional:  Denies recent weight loss, weight gain, fever or chills, no change in exercise tolerance     HENT:   hearing loss, no epistaxis, hoarseness, or difficulty speaking.     Eyes: Wears eyeglasses or contact lenses     Respiratory:  Denies dyspnea with exertion,no cough, wheezing, or hemoptysis.     Cardiovascular: Negative for palpations, chest pain, orthopnea, PND.  Has peripheral vascular disease.    Gastrointestinal:  Denies change in bowel habits, dyspepsia, ulcer disease, hematochezia, or melena. "     Endocrine: Negative for cold intolerance, heat intolerance, polydipsia, polyphagia and polyuria.     Genitourinary: h/o prostate carcinoma      Musculoskeletal: DJD. No hematoma    Neurological: TIA    Hematological: Denies any food allergies, seasonal allergies, bleeding disorders, or lymphadenopathy.     Psychiatric/Behavioral: Denies any history of depression, substance abuse, or change in cognitive function.       Physical Exam: The following systems were reassessed and changes noted as indicated below    Constitutional: Cooperative, alert and oriented, well-developed, well-nourished, in no acute distress.     HENT:   Head: Normocephalic, normal hair patterns, no masses or tenderness.  Ears, Nose, and Throat: No gross abnormalities. No pallor or cyanosis. Eyes: EOMS intact, PERRL, conjunctivae and lids unremarkable. Fundoscopic exam and visual fields not performed.   Neck: No palpable masses or adenopathy, no thyromegaly, no JVD, carotid pulses are full and equal bilaterally and without  Bruits.     Cardiovascular: Irregular rhythm, S1 variable, S2 normal, no S3 or S4.  No murmurs, gallops, or rubs detected.     Pulmonary/Chest: Chest: normal symmetry, normal respiratory excursion, no intercostal retraction, no use of accessory muscles.            Pulmonary: Normal breath sounds. No rales or ronchi.    Abdominal: Abdomen soft, bowel sounds normoactive, no masses, no hepatosplenomegaly, non-tender, no bruits.     Musculoskeletal: No erythema or edema.    Neurological: No gross motor or sensory deficits noted, affect appropriate, oriented to time, person, place.     Psychiatric: He has a normal mood and affect. His behavior is normal. Judgment and thought content normal.         Procedures      Lab Results   Component Value Date    WBC 5.63 03/10/2022    HGB 13.1 03/10/2022    HCT 38.2 03/10/2022    MCV 94.1 03/10/2022     03/10/2022     Lab Results   Component Value Date    GLUCOSE 95 03/10/2022    BUN  20 03/10/2022    CREATININE 1.17 03/10/2022    EGFRIFNONA 69 05/01/2021    EGFRIFAFRI 63 02/25/2019    BCR 17.1 03/10/2022    CO2 24.0 03/10/2022    CALCIUM 9.4 03/10/2022    PROTENTOTREF 8.1 02/25/2019    ALBUMIN 3.40 (L) 04/21/2021    LABIL2 1.4 02/25/2019    AST 18 04/21/2021    ALT 14 04/21/2021     No results found for: CHOL  Lab Results   Component Value Date    TRIG 101 02/25/2019    TRIG 89 05/03/2018    TRIG 111 11/01/2017     Lab Results   Component Value Date    HDL 49 02/25/2019    HDL 42 05/03/2018    HDL 48 11/01/2017     No components found for: LDLCALC  Lab Results   Component Value Date    LDL 83 02/25/2019    LDL 79 05/03/2018    LDL 88 11/01/2017     No results found for: HDLLDLRATIO  No components found for: CHOLHDL  Lab Results   Component Value Date    HGBA1C 5.70 (H) 02/25/2019     Lab Results   Component Value Date    TSH 1.420 02/25/2019           ASSESSMENT AND PLAN  Mr. Stephens has multiple medical problems as described in detail in the history of present illness.  He is progressing quite well and denied any cardiac symptoms at the present time.  He is well rate controlled with underlying atrial fibrillation.  He has not had any syncopal episodes.  No chest pain or shortness of breath is reported.  He is mentally very alert.    I have continued his present medications including anticoagulation with Eliquis, antihypertensive therapy with Ziac, amlodipine, antianginal therapy with isosorbide mononitrate and statin therapy with pravastatin.  I understand that he has had lab work done by his primary care physician last month and a copy of results will be obtained for our records.    Diagnoses and all orders for this visit:    1. Coronary artery disease involving native coronary artery of native heart without angina pectoris (Primary)  -     ECG 12 Lead    2. Atrial fibrillation, persistent    3. Mixed hyperlipidemia    4. Primary hypertension    5. PVD (peripheral vascular  disease)        Patient's Body mass index is 27.48 kg/m². BMI is above normal parameters. Recommendations include: exercise counseling and nutrition counseling.      Josefina Tacos Stephens Jr.  reports that he has never smoked. He has never been exposed to tobacco smoke. He has never used smokeless tobacco.    Govind Sweeney MD  6/6/2023  11:40 CDT

## 2023-08-09 RX ORDER — AMLODIPINE BESYLATE 5 MG/1
TABLET ORAL
Qty: 90 TABLET | Refills: 3 | Status: SHIPPED | OUTPATIENT
Start: 2023-08-09

## 2023-08-09 RX ORDER — ISOSORBIDE MONONITRATE 60 MG/1
TABLET, EXTENDED RELEASE ORAL
Qty: 90 TABLET | Refills: 3 | Status: SHIPPED | OUTPATIENT
Start: 2023-08-09

## 2023-08-09 RX ORDER — BISOPROLOL FUMARATE AND HYDROCHLOROTHIAZIDE 10; 6.25 MG/1; MG/1
TABLET ORAL
Qty: 90 TABLET | Refills: 3 | Status: SHIPPED | OUTPATIENT
Start: 2023-08-09

## 2023-08-14 ENCOUNTER — OUTSIDE FACILITY SERVICE (OUTPATIENT)
Dept: WOUND CARE | Facility: HOSPITAL | Age: 88
End: 2023-08-14
Payer: MEDICARE

## 2023-08-14 ENCOUNTER — OFFICE VISIT (OUTPATIENT)
Dept: WOUND CARE | Facility: HOSPITAL | Age: 88
End: 2023-08-14
Payer: MEDICARE

## 2023-08-14 DIAGNOSIS — I73.9 PVD (PERIPHERAL VASCULAR DISEASE) WITH CLAUDICATION: Primary | ICD-10-CM

## 2023-08-14 PROCEDURE — G0463 HOSPITAL OUTPT CLINIC VISIT: HCPCS

## 2023-09-08 ENCOUNTER — OFFICE VISIT (OUTPATIENT)
Dept: PODIATRY | Facility: CLINIC | Age: 88
End: 2023-09-08
Payer: MEDICARE

## 2023-09-08 VITALS
DIASTOLIC BLOOD PRESSURE: 77 MMHG | HEIGHT: 71 IN | OXYGEN SATURATION: 98 % | WEIGHT: 197.09 LBS | HEART RATE: 67 BPM | BODY MASS INDEX: 27.59 KG/M2 | SYSTOLIC BLOOD PRESSURE: 118 MMHG

## 2023-09-08 DIAGNOSIS — L84 FOOT CALLUS: ICD-10-CM

## 2023-09-08 DIAGNOSIS — B35.1 ONYCHOMYCOSIS: Primary | ICD-10-CM

## 2023-09-08 DIAGNOSIS — L60.2 ONYCHOGRYPHOSIS: ICD-10-CM

## 2023-09-08 PROCEDURE — 99213 OFFICE O/P EST LOW 20 MIN: CPT | Performed by: NURSE PRACTITIONER

## 2023-09-08 NOTE — PROGRESS NOTES
Josefina Stephens Jr.  12/17/1926  89 y.o. male  PCP: Leticia Field, APRN 05/16/2023  Non-Diabetic      09/08/2023      Chief Complaint   Patient presents with    Right Foot - Callouses       History of Present Illness    Josefina Stephens Jr. is a 89 y.o.male. Patient presents to clinic for diabetic foot care.      Past Medical History:   Diagnosis Date    Arthritis     Atrial fibrillation     Callus     Duodenal ulcer     HX    GERD (gastroesophageal reflux disease)     GI (gastrointestinal bleed)     HX    Gout     Hyperlipidemia     Hypertension     Prostate cancer     Skin cancer     Sleep apnea     TIA (transient ischemic attack)          Past Surgical History:   Procedure Laterality Date    COLONOSCOPY  1955    COLONOSCOPY N/A 7/6/2017    Procedure: COLONOSCOPY to cecum ;  Surgeon: Faisal Mac MD;  Location:  [facility];  Service:     CORONARY ANGIOPLASTY WITH STENT PLACEMENT      ENDOSCOPY N/A 7/6/2017    Procedure: ESOPHAGOGASTRODUODENOSCOPY with biopsies;  Surgeon: Faisal Mac MD;  Location:  [facility];  Service:     HERNIA REPAIR      PROSTATE RADIOACTIVE SEED IMPLANT      SKIN BIOPSY      UPPER GASTROINTESTINAL ENDOSCOPY  11/12/2008    GI bleed, peptic ulcer disease, melena, treated w/heater probe         Family History   Problem Relation Age of Onset    Parkinsonism Mother     Diabetes Father     Heart disease Father        Allergies   Allergen Reactions    Lotrel [Amlodipine Besy-Benazepril Hcl] Swelling    Aliskiren     Colesevelam Diarrhea    Contrast Dye (Echo Or Unknown Ct/Mr)     Influenza Virus Vaccine Split     Lipitor [Atorvastatin]     Penicillins     Ticlopidine Rash       Social History     Socioeconomic History    Marital status:    Tobacco Use    Smoking status: Never     Passive exposure: Never    Smokeless tobacco: Never   Substance and Sexual Activity    Alcohol use: No    Drug use: No    Sexual activity: Defer         Current Outpatient Medications  "  Medication Sig Dispense Refill    Acetaminophen (TYLENOL PO) Take  by mouth As Needed.      albuterol sulfate  (90 Base) MCG/ACT inhaler INHALE 2 PUFFS BY MOUTH EVERY 4 HOURS      allopurinol (ZYLOPRIM) 100 MG tablet       amLODIPine (NORVASC) 5 MG tablet TAKE 1 TABLET EVERY DAY 90 tablet 3    apixaban (Eliquis) 5 MG tablet tablet Take 1 tablet by mouth Every 12 (Twelve) Hours. 180 tablet 3    bisoprolol-hydrochlorothiazide (ZIAC) 10-6.25 MG per tablet TAKE 1 TABLET EVERY DAY 90 tablet 3    FeroSul 325 (65 Fe) MG tablet Take 1 tablet by mouth Daily.      furosemide (LASIX) 20 MG tablet       isosorbide mononitrate (IMDUR) 60 MG 24 hr tablet TAKE 1 TABLET EVERY DAY 90 tablet 3    montelukast (SINGULAIR) 10 MG tablet       Multiple Vitamins-Minerals (MULTIVITAMIN ADULT PO) Take  by mouth.      pentoxifylline (TRENtal) 400 MG CR tablet TAKE 1 TABLET TWICE DAILY WITH MEALS 180 tablet 3    pravastatin (PRAVACHOL) 40 MG tablet TAKE 1 TABLET EVERY DAY 90 tablet 3    simethicone (MYLICON) 125 MG chewable tablet Chew 1 tablet.       No current facility-administered medications for this visit.       Review of Systems      OBJECTIVE    /77   Pulse 67   Ht 180.3 cm (70.98\")   Wt 89.4 kg (197 lb 1.5 oz)   SpO2 98%   BMI 27.50 kg/m²     Body mass index is 27.5 kg/m².        Physical Exam  Vitals reviewed.   Constitutional:       Appearance: Normal appearance. He is well-developed.   HENT:      Head: Normocephalic and atraumatic.   Neck:      Trachea: Trachea and phonation normal.   Cardiovascular:      Pulses:           Dorsalis pedis pulses are 1+ on the right side and 1+ on the left side.        Posterior tibial pulses are 1+ on the right side and 1+ on the left side.   Pulmonary:      Effort: Pulmonary effort is normal. No respiratory distress.   Abdominal:      General: There is no distension.      Palpations: Abdomen is soft.   Feet:      Right foot:      Skin integrity: Callus present.      Toenail " Condition: Right toenails are abnormally thick and long. Fungal disease present.     Left foot:      Skin integrity: Skin integrity normal.      Toenail Condition: Left toenails are abnormally thick and long. Fungal disease present.  Skin:     General: Skin is warm and dry.   Neurological:      Mental Status: He is alert and oriented to person, place, and time.      GCS: GCS eye subscore is 4. GCS verbal subscore is 5. GCS motor subscore is 6.   Psychiatric:         Speech: Speech normal.         Behavior: Behavior normal. Behavior is cooperative.         Thought Content: Thought content normal.         Judgment: Judgment normal.              Procedures    Reviewed prior exam notes/labs/tracings    ASSESSMENT AND PLAN    Diagnoses and all orders for this visit:    1. Onychomycosis (Primary)    2. Onychogryphosis    3. Foot callus      Body mass index is 27.5 kg/m².    Recommend follow-up with primary care to discuss BMI greater than 30    Impaired calluses on the right foot today in office.  Patient tolerated well and will follow-up as needed for worsening symptoms.              This document has been electronically signed by Villa SANTIAGO [id number], ONP-C on September 8, 2023 14:20 CDT

## (undated) DEVICE — CANN NASL CO2 TRULINK W/O2 A/

## (undated) DEVICE — THE TORRENT IRRIGATION SCOPE CONNECTOR IS USED WITH THE TORRENT IRRIGATION TUBING TO PROVIDE IRRIGATION FLUIDS SUCH AS STERILE WATER DURING GASTROINTESTINAL ENDOSCOPIC PROCEDURES WHEN USED IN CONJUNCTION WITH AN IRRIGATION PUMP (OR ELECTROSURGICAL UNIT).: Brand: TORRENT

## (undated) DEVICE — TUBING, SUCTION, 1/4" X 10', STRAIGHT: Brand: MEDLINE

## (undated) DEVICE — BITEBLOCK OMNI BLOC

## (undated) DEVICE — Device: Brand: DEFENDO AIR/WATER/SUCTION AND BIOPSY VALVE

## (undated) DEVICE — FRCP BX RADJAW4 NDL 2.8 240CM LG OG BX40